# Patient Record
Sex: MALE | Race: WHITE | NOT HISPANIC OR LATINO | Employment: OTHER | URBAN - METROPOLITAN AREA
[De-identification: names, ages, dates, MRNs, and addresses within clinical notes are randomized per-mention and may not be internally consistent; named-entity substitution may affect disease eponyms.]

---

## 2021-12-15 ENCOUNTER — APPOINTMENT (OUTPATIENT)
Dept: NUCLEAR MEDICINE | Facility: HOSPITAL | Age: 66
End: 2021-12-15

## 2021-12-15 ENCOUNTER — APPOINTMENT (OUTPATIENT)
Dept: GENERAL RADIOLOGY | Facility: HOSPITAL | Age: 66
End: 2021-12-15

## 2021-12-15 ENCOUNTER — APPOINTMENT (OUTPATIENT)
Dept: CARDIOLOGY | Facility: HOSPITAL | Age: 66
End: 2021-12-15

## 2021-12-15 ENCOUNTER — HOSPITAL ENCOUNTER (INPATIENT)
Facility: HOSPITAL | Age: 66
LOS: 5 days | Discharge: HOME | End: 2021-12-20
Attending: STUDENT IN AN ORGANIZED HEALTH CARE EDUCATION/TRAINING PROGRAM | Admitting: INTERNAL MEDICINE

## 2021-12-15 DIAGNOSIS — R11.0 NAUSEA: ICD-10-CM

## 2021-12-15 DIAGNOSIS — Z79.01 CHRONIC ANTICOAGULATION: ICD-10-CM

## 2021-12-15 DIAGNOSIS — Z86.39 HISTORY OF HYPERLIPIDEMIA: ICD-10-CM

## 2021-12-15 DIAGNOSIS — Z74.09 IMPAIRED FUNCTIONAL MOBILITY, BALANCE, GAIT, AND ENDURANCE: ICD-10-CM

## 2021-12-15 DIAGNOSIS — E11.42 TYPE 2 DIABETES MELLITUS WITH DIABETIC POLYNEUROPATHY, WITHOUT LONG-TERM CURRENT USE OF INSULIN (HCC): ICD-10-CM

## 2021-12-15 DIAGNOSIS — F17.200 TOBACCO DEPENDENCE: ICD-10-CM

## 2021-12-15 DIAGNOSIS — R44.1 VISUAL HALLUCINATION: ICD-10-CM

## 2021-12-15 DIAGNOSIS — Z86.39 HISTORY OF DIABETES MELLITUS: ICD-10-CM

## 2021-12-15 DIAGNOSIS — R77.8 ELEVATED TROPONIN: ICD-10-CM

## 2021-12-15 DIAGNOSIS — N17.9 ACUTE RENAL FAILURE, UNSPECIFIED ACUTE RENAL FAILURE TYPE (HCC): ICD-10-CM

## 2021-12-15 DIAGNOSIS — F11.93 HEROIN WITHDRAWAL (HCC): Primary | ICD-10-CM

## 2021-12-15 DIAGNOSIS — Z86.79 HISTORY OF HYPERTENSION: ICD-10-CM

## 2021-12-15 DIAGNOSIS — I48.20 CHRONIC ATRIAL FIBRILLATION (HCC): ICD-10-CM

## 2021-12-15 PROBLEM — N18.4 CKD (CHRONIC KIDNEY DISEASE) STAGE 4, GFR 15-29 ML/MIN (HCC): Status: ACTIVE | Noted: 2021-12-15

## 2021-12-15 PROBLEM — E78.5 HYPERLIPIDEMIA: Status: ACTIVE | Noted: 2021-12-15

## 2021-12-15 PROBLEM — K21.9 GERD (GASTROESOPHAGEAL REFLUX DISEASE): Status: ACTIVE | Noted: 2021-12-15

## 2021-12-15 PROBLEM — J44.9 COPD (CHRONIC OBSTRUCTIVE PULMONARY DISEASE) (HCC): Status: ACTIVE | Noted: 2021-12-15

## 2021-12-15 PROBLEM — F19.90 ILLICIT DRUG USE: Status: ACTIVE | Noted: 2021-12-15

## 2021-12-15 PROBLEM — I21.4 NSTEMI (NON-ST ELEVATED MYOCARDIAL INFARCTION) (HCC): Status: ACTIVE | Noted: 2021-12-15

## 2021-12-15 PROBLEM — E11.9 TYPE 2 DIABETES MELLITUS (HCC): Status: ACTIVE | Noted: 2021-12-15

## 2021-12-15 PROBLEM — E03.9 HYPOTHYROIDISM: Status: ACTIVE | Noted: 2021-12-15

## 2021-12-15 PROBLEM — I10 ESSENTIAL HYPERTENSION: Status: ACTIVE | Noted: 2021-12-15

## 2021-12-15 PROBLEM — I48.91 ATRIAL FIBRILLATION (HCC): Status: ACTIVE | Noted: 2021-12-15

## 2021-12-15 PROBLEM — Z72.0 TOBACCO USE: Status: ACTIVE | Noted: 2021-12-15

## 2021-12-15 LAB
ALBUMIN SERPL-MCNC: 3.2 G/DL (ref 3.5–5.2)
ALBUMIN/GLOB SERPL: 1.2 G/DL
ALP SERPL-CCNC: 94 U/L (ref 39–117)
ALT SERPL W P-5'-P-CCNC: 10 U/L (ref 1–41)
AMPHET+METHAMPHET UR QL: NEGATIVE
AMPHETAMINES UR QL: NEGATIVE
ANION GAP SERPL CALCULATED.3IONS-SCNC: 11 MMOL/L (ref 5–15)
ANION GAP SERPL CALCULATED.3IONS-SCNC: 8 MMOL/L (ref 5–15)
AST SERPL-CCNC: 20 U/L (ref 1–40)
BACTERIA UR QL AUTO: ABNORMAL /HPF
BARBITURATES UR QL SCN: NEGATIVE
BASOPHILS # BLD AUTO: 0.08 10*3/MM3 (ref 0–0.2)
BASOPHILS NFR BLD AUTO: 0.7 % (ref 0–1.5)
BENZODIAZ UR QL SCN: NEGATIVE
BH CV ECHO MEAS - AO MAX PG (FULL): 11.5 MMHG
BH CV ECHO MEAS - AO MAX PG: 17.2 MMHG
BH CV ECHO MEAS - AO MEAN PG (FULL): 5.7 MMHG
BH CV ECHO MEAS - AO MEAN PG: 8.8 MMHG
BH CV ECHO MEAS - AO ROOT AREA (BSA CORRECTED): 1.5
BH CV ECHO MEAS - AO ROOT AREA: 7.9 CM^2
BH CV ECHO MEAS - AO ROOT DIAM: 3.2 CM
BH CV ECHO MEAS - AO V2 MAX: 207.5 CM/SEC
BH CV ECHO MEAS - AO V2 MEAN: 135.7 CM/SEC
BH CV ECHO MEAS - AO V2 VTI: 53 CM
BH CV ECHO MEAS - AVA(I,A): 1.5 CM^2
BH CV ECHO MEAS - AVA(I,D): 1.5 CM^2
BH CV ECHO MEAS - AVA(V,A): 1.8 CM^2
BH CV ECHO MEAS - AVA(V,D): 1.8 CM^2
BH CV ECHO MEAS - BSA(HAYCOCK): 2.2 M^2
BH CV ECHO MEAS - BSA: 2.1 M^2
BH CV ECHO MEAS - BZI_BMI: 33.9 KILOGRAMS/M^2
BH CV ECHO MEAS - BZI_METRIC_HEIGHT: 172.7 CM
BH CV ECHO MEAS - BZI_METRIC_WEIGHT: 101.2 KG
BH CV ECHO MEAS - EDV(CUBED): 139.1 ML
BH CV ECHO MEAS - EDV(MOD-SP4): 96 ML
BH CV ECHO MEAS - EDV(TEICH): 128.4 ML
BH CV ECHO MEAS - EF(CUBED): 79.7 %
BH CV ECHO MEAS - EF(MOD-SP4): 66.7 %
BH CV ECHO MEAS - EF(TEICH): 71.8 %
BH CV ECHO MEAS - ESV(CUBED): 28.2 ML
BH CV ECHO MEAS - ESV(MOD-SP4): 32 ML
BH CV ECHO MEAS - ESV(TEICH): 36.2 ML
BH CV ECHO MEAS - FS: 41.3 %
BH CV ECHO MEAS - IVS/LVPW: 0.79
BH CV ECHO MEAS - IVSD: 1 CM
BH CV ECHO MEAS - LA DIMENSION: 4.5 CM
BH CV ECHO MEAS - LA/AO: 1.4
BH CV ECHO MEAS - LAD MAJOR: 5.6 CM
BH CV ECHO MEAS - LAT PEAK E' VEL: 6.4 CM/SEC
BH CV ECHO MEAS - LATERAL E/E' RATIO: 28.1
BH CV ECHO MEAS - LV DIASTOLIC VOL/BSA (35-75): 44.8 ML/M^2
BH CV ECHO MEAS - LV MASS(C)D: 241.5 GRAMS
BH CV ECHO MEAS - LV MASS(C)DI: 112.8 GRAMS/M^2
BH CV ECHO MEAS - LV MAX PG: 5.7 MMHG
BH CV ECHO MEAS - LV MEAN PG: 3.1 MMHG
BH CV ECHO MEAS - LV SYSTOLIC VOL/BSA (12-30): 14.9 ML/M^2
BH CV ECHO MEAS - LV V1 MAX: 119.3 CM/SEC
BH CV ECHO MEAS - LV V1 MEAN: 78.9 CM/SEC
BH CV ECHO MEAS - LV V1 VTI: 25.6 CM
BH CV ECHO MEAS - LVIDD: 5.2 CM
BH CV ECHO MEAS - LVIDS: 3 CM
BH CV ECHO MEAS - LVLD AP4: 7.3 CM
BH CV ECHO MEAS - LVLS AP4: 6.8 CM
BH CV ECHO MEAS - LVOT AREA (M): 3.1 CM^2
BH CV ECHO MEAS - LVOT AREA: 3.2 CM^2
BH CV ECHO MEAS - LVOT DIAM: 2 CM
BH CV ECHO MEAS - LVPWD: 1.3 CM
BH CV ECHO MEAS - MED PEAK E' VEL: 10.6 CM/SEC
BH CV ECHO MEAS - MEDIAL E/E' RATIO: 17
BH CV ECHO MEAS - MR MAX PG: 82 MMHG
BH CV ECHO MEAS - MR MAX VEL: 449.6 CM/SEC
BH CV ECHO MEAS - MR MEAN PG: 57.2 MMHG
BH CV ECHO MEAS - MR MEAN VEL: 357.5 CM/SEC
BH CV ECHO MEAS - MR VTI: 151 CM
BH CV ECHO MEAS - MV DEC TIME: 0.16 SEC
BH CV ECHO MEAS - MV E MAX VEL: 183.1 CM/SEC
BH CV ECHO MEAS - MV MAX PG: 15.3 MMHG
BH CV ECHO MEAS - MV MEAN PG: 5.8 MMHG
BH CV ECHO MEAS - MV V2 MAX: 195.5 CM/SEC
BH CV ECHO MEAS - MV V2 MEAN: 105.9 CM/SEC
BH CV ECHO MEAS - MV V2 VTI: 48.8 CM
BH CV ECHO MEAS - MVA(VTI): 1.7 CM^2
BH CV ECHO MEAS - PA ACC SLOPE: 349.1 CM/SEC^2
BH CV ECHO MEAS - PA ACC TIME: 0.21 SEC
BH CV ECHO MEAS - PA MAX PG: 8.4 MMHG
BH CV ECHO MEAS - PA PR(ACCEL): -15.3 MMHG
BH CV ECHO MEAS - PA V2 MAX: 144.8 CM/SEC
BH CV ECHO MEAS - RAP SYSTOLE: 8 MMHG
BH CV ECHO MEAS - RVSP: 53 MMHG
BH CV ECHO MEAS - SI(AO): 195.4 ML/M^2
BH CV ECHO MEAS - SI(CUBED): 51.8 ML/M^2
BH CV ECHO MEAS - SI(LVOT): 37.9 ML/M^2
BH CV ECHO MEAS - SI(MOD-SP4): 29.9 ML/M^2
BH CV ECHO MEAS - SI(TEICH): 43.1 ML/M^2
BH CV ECHO MEAS - SV(AO): 418.3 ML
BH CV ECHO MEAS - SV(CUBED): 110.9 ML
BH CV ECHO MEAS - SV(LVOT): 81.2 ML
BH CV ECHO MEAS - SV(MOD-SP4): 64 ML
BH CV ECHO MEAS - SV(TEICH): 92.2 ML
BH CV ECHO MEAS - TAPSE (>1.6): 3.2 CM
BH CV ECHO MEAS - TR MAX PG: 45 MMHG
BH CV ECHO MEAS - TR MAX VEL: 332.9 CM/SEC
BH CV ECHO MEAS - TV MAX PG: 2.2 MMHG
BH CV ECHO MEAS - TV V2 MAX: 73.4 CM/SEC
BH CV ECHO MEASUREMENTS AVERAGE E/E' RATIO: 21.54
BH CV VAS BP RIGHT ARM: NORMAL MMHG
BH CV XLRA - RV BASE: 4 CM
BH CV XLRA - RV LENGTH: 6.6 CM
BH CV XLRA - RV MID: 3.7 CM
BH CV XLRA - TDI S': 11.1 CM/SEC
BILIRUB SERPL-MCNC: 0.2 MG/DL (ref 0–1.2)
BILIRUB UR QL STRIP: NEGATIVE
BUN SERPL-MCNC: 44 MG/DL (ref 8–23)
BUN SERPL-MCNC: 51 MG/DL (ref 8–23)
BUN/CREAT SERPL: 18.1 (ref 7–25)
BUN/CREAT SERPL: 19.8 (ref 7–25)
BUPRENORPHINE SERPL-MCNC: NEGATIVE NG/ML
CALCIUM SPEC-SCNC: 8 MG/DL (ref 8.6–10.5)
CALCIUM SPEC-SCNC: 8.2 MG/DL (ref 8.6–10.5)
CANNABINOIDS SERPL QL: NEGATIVE
CHLORIDE SERPL-SCNC: 105 MMOL/L (ref 98–107)
CHLORIDE SERPL-SCNC: 109 MMOL/L (ref 98–107)
CHOLEST SERPL-MCNC: 92 MG/DL (ref 0–200)
CLARITY UR: CLEAR
CO2 SERPL-SCNC: 23 MMOL/L (ref 22–29)
CO2 SERPL-SCNC: 24 MMOL/L (ref 22–29)
COCAINE UR QL: NEGATIVE
COLOR UR: YELLOW
CREAT SERPL-MCNC: 2.22 MG/DL (ref 0.76–1.27)
CREAT SERPL-MCNC: 2.81 MG/DL (ref 0.76–1.27)
CREAT UR-MCNC: 170.2 MG/DL
CRP SERPL-MCNC: 1.1 MG/DL (ref 0–0.5)
D DIMER PPP FEU-MCNC: 0.74 MCGFEU/ML (ref 0–0.56)
D-LACTATE SERPL-SCNC: 1.2 MMOL/L (ref 0.5–2)
DEPRECATED RDW RBC AUTO: 51.4 FL (ref 37–54)
EOSINOPHIL # BLD AUTO: 0.31 10*3/MM3 (ref 0–0.4)
EOSINOPHIL NFR BLD AUTO: 2.8 % (ref 0.3–6.2)
EOSINOPHIL SPEC QL MICRO: 0 % EOS/100 CELLS (ref 0–0)
ERYTHROCYTE [DISTWIDTH] IN BLOOD BY AUTOMATED COUNT: 16.4 % (ref 12.3–15.4)
ETHANOL BLD-MCNC: <10 MG/DL (ref 0–10)
FLUAV RNA RESP QL NAA+PROBE: NOT DETECTED
FLUBV RNA RESP QL NAA+PROBE: NOT DETECTED
GFR SERPL CREATININE-BSD FRML MDRD: 23 ML/MIN/1.73
GFR SERPL CREATININE-BSD FRML MDRD: 30 ML/MIN/1.73
GLOBULIN UR ELPH-MCNC: 2.6 GM/DL
GLUCOSE BLDC GLUCOMTR-MCNC: 129 MG/DL (ref 70–130)
GLUCOSE BLDC GLUCOMTR-MCNC: 87 MG/DL (ref 70–130)
GLUCOSE BLDC GLUCOMTR-MCNC: 90 MG/DL (ref 70–130)
GLUCOSE BLDC GLUCOMTR-MCNC: 96 MG/DL (ref 70–130)
GLUCOSE SERPL-MCNC: 138 MG/DL (ref 65–99)
GLUCOSE SERPL-MCNC: 77 MG/DL (ref 65–99)
GLUCOSE UR STRIP-MCNC: NEGATIVE MG/DL
HBA1C MFR BLD: 7.3 % (ref 4.8–5.6)
HCT VFR BLD AUTO: 32.5 % (ref 37.5–51)
HDLC SERPL-MCNC: 41 MG/DL (ref 40–60)
HGB BLD-MCNC: 10.5 G/DL (ref 13–17.7)
HGB UR QL STRIP.AUTO: ABNORMAL
HYALINE CASTS UR QL AUTO: ABNORMAL /LPF
IMM GRANULOCYTES # BLD AUTO: 0.04 10*3/MM3 (ref 0–0.05)
IMM GRANULOCYTES NFR BLD AUTO: 0.4 % (ref 0–0.5)
KETONES UR QL STRIP: ABNORMAL
LDH SERPL-CCNC: 238 U/L (ref 135–225)
LDLC SERPL CALC-MCNC: 39 MG/DL (ref 0–100)
LDLC/HDLC SERPL: 1.01 {RATIO}
LEUKOCYTE ESTERASE UR QL STRIP.AUTO: NEGATIVE
LIPASE SERPL-CCNC: 20 U/L (ref 13–60)
LV EF 2D ECHO EST: 65 %
LYMPHOCYTES # BLD AUTO: 1.57 10*3/MM3 (ref 0.7–3.1)
LYMPHOCYTES NFR BLD AUTO: 14.3 % (ref 19.6–45.3)
MAGNESIUM SERPL-MCNC: 1.8 MG/DL (ref 1.6–2.4)
MAXIMAL PREDICTED HEART RATE: 157 BPM
MCH RBC QN AUTO: 27.9 PG (ref 26.6–33)
MCHC RBC AUTO-ENTMCNC: 32.3 G/DL (ref 31.5–35.7)
MCV RBC AUTO: 86.2 FL (ref 79–97)
METHADONE UR QL SCN: NEGATIVE
MONOCYTES # BLD AUTO: 0.85 10*3/MM3 (ref 0.1–0.9)
MONOCYTES NFR BLD AUTO: 7.8 % (ref 5–12)
NEUTROPHILS NFR BLD AUTO: 74 % (ref 42.7–76)
NEUTROPHILS NFR BLD AUTO: 8.1 10*3/MM3 (ref 1.7–7)
NITRITE UR QL STRIP: NEGATIVE
NRBC BLD AUTO-RTO: 0 /100 WBC (ref 0–0.2)
OPIATES UR QL: NEGATIVE
OXYCODONE UR QL SCN: POSITIVE
PCP UR QL SCN: NEGATIVE
PH UR STRIP.AUTO: <=5 [PH] (ref 5–8)
PLATELET # BLD AUTO: 236 10*3/MM3 (ref 140–450)
PMV BLD AUTO: 11.9 FL (ref 6–12)
POTASSIUM SERPL-SCNC: 5 MMOL/L (ref 3.5–5.2)
POTASSIUM SERPL-SCNC: 5.5 MMOL/L (ref 3.5–5.2)
PROCALCITONIN SERPL-MCNC: 0.08 NG/ML (ref 0–0.25)
PROPOXYPH UR QL: NEGATIVE
PROT ?TM UR-MCNC: 98.7 MG/DL
PROT SERPL-MCNC: 5.8 G/DL (ref 6–8.5)
PROT UR QL STRIP: ABNORMAL
RBC # BLD AUTO: 3.77 10*6/MM3 (ref 4.14–5.8)
RBC # UR STRIP: ABNORMAL /HPF
REF LAB TEST METHOD: ABNORMAL
SARS-COV-2 RNA RESP QL NAA+PROBE: NOT DETECTED
SODIUM SERPL-SCNC: 139 MMOL/L (ref 136–145)
SODIUM SERPL-SCNC: 141 MMOL/L (ref 136–145)
SODIUM UR-SCNC: 31 MMOL/L
SP GR UR STRIP: 1.02 (ref 1–1.03)
SQUAMOUS #/AREA URNS HPF: ABNORMAL /HPF
STRESS TARGET HR: 133 BPM
T4 FREE SERPL-MCNC: 1.46 NG/DL (ref 0.93–1.7)
TRICYCLICS UR QL SCN: NEGATIVE
TRIGL SERPL-MCNC: 48 MG/DL (ref 0–150)
TROPONIN T SERPL-MCNC: 0.12 NG/ML (ref 0–0.03)
TROPONIN T SERPL-MCNC: 0.12 NG/ML (ref 0–0.03)
TSH SERPL DL<=0.05 MIU/L-ACNC: 0.12 UIU/ML (ref 0.27–4.2)
UFH PPP CHRO-ACNC: 0.1 IU/ML (ref 0.3–0.7)
UROBILINOGEN UR QL STRIP: ABNORMAL
VLDLC SERPL-MCNC: 12 MG/DL (ref 5–40)
WBC # UR STRIP: ABNORMAL /HPF
WBC NRBC COR # BLD: 10.95 10*3/MM3 (ref 3.4–10.8)

## 2021-12-15 PROCEDURE — 93306 TTE W/DOPPLER COMPLETE: CPT | Performed by: INTERNAL MEDICINE

## 2021-12-15 PROCEDURE — A9540 TC99M MAA: HCPCS | Performed by: INTERNAL MEDICINE

## 2021-12-15 PROCEDURE — 86140 C-REACTIVE PROTEIN: CPT | Performed by: PHYSICIAN ASSISTANT

## 2021-12-15 PROCEDURE — 84484 ASSAY OF TROPONIN QUANT: CPT | Performed by: STUDENT IN AN ORGANIZED HEALTH CARE EDUCATION/TRAINING PROGRAM

## 2021-12-15 PROCEDURE — 25010000002 ONDANSETRON PER 1 MG

## 2021-12-15 PROCEDURE — 82962 GLUCOSE BLOOD TEST: CPT

## 2021-12-15 PROCEDURE — 0 TECHNETIUM ALBUMIN AGGREGATED: Performed by: INTERNAL MEDICINE

## 2021-12-15 PROCEDURE — 99284 EMERGENCY DEPT VISIT MOD MDM: CPT

## 2021-12-15 PROCEDURE — 87205 SMEAR GRAM STAIN: CPT | Performed by: NURSE PRACTITIONER

## 2021-12-15 PROCEDURE — 85520 HEPARIN ASSAY: CPT

## 2021-12-15 PROCEDURE — 84300 ASSAY OF URINE SODIUM: CPT | Performed by: NURSE PRACTITIONER

## 2021-12-15 PROCEDURE — 78582 LUNG VENTILAT&PERFUS IMAGING: CPT

## 2021-12-15 PROCEDURE — 99221 1ST HOSP IP/OBS SF/LOW 40: CPT | Performed by: INTERNAL MEDICINE

## 2021-12-15 PROCEDURE — 83615 LACTATE (LD) (LDH) ENZYME: CPT | Performed by: PHYSICIAN ASSISTANT

## 2021-12-15 PROCEDURE — 85379 FIBRIN DEGRADATION QUANT: CPT | Performed by: PHYSICIAN ASSISTANT

## 2021-12-15 PROCEDURE — 82077 ASSAY SPEC XCP UR&BREATH IA: CPT | Performed by: STUDENT IN AN ORGANIZED HEALTH CARE EDUCATION/TRAINING PROGRAM

## 2021-12-15 PROCEDURE — 25010000002 DIAZEPAM PER 5 MG: Performed by: NURSE PRACTITIONER

## 2021-12-15 PROCEDURE — 81001 URINALYSIS AUTO W/SCOPE: CPT | Performed by: PHYSICIAN ASSISTANT

## 2021-12-15 PROCEDURE — 83605 ASSAY OF LACTIC ACID: CPT | Performed by: PHYSICIAN ASSISTANT

## 2021-12-15 PROCEDURE — 83690 ASSAY OF LIPASE: CPT | Performed by: STUDENT IN AN ORGANIZED HEALTH CARE EDUCATION/TRAINING PROGRAM

## 2021-12-15 PROCEDURE — 84156 ASSAY OF PROTEIN URINE: CPT | Performed by: NURSE PRACTITIONER

## 2021-12-15 PROCEDURE — 93306 TTE W/DOPPLER COMPLETE: CPT

## 2021-12-15 PROCEDURE — 93005 ELECTROCARDIOGRAM TRACING: CPT | Performed by: PHYSICIAN ASSISTANT

## 2021-12-15 PROCEDURE — 84145 PROCALCITONIN (PCT): CPT | Performed by: PHYSICIAN ASSISTANT

## 2021-12-15 PROCEDURE — 80306 DRUG TEST PRSMV INSTRMNT: CPT | Performed by: PHYSICIAN ASSISTANT

## 2021-12-15 PROCEDURE — 83735 ASSAY OF MAGNESIUM: CPT | Performed by: PHYSICIAN ASSISTANT

## 2021-12-15 PROCEDURE — 83036 HEMOGLOBIN GLYCOSYLATED A1C: CPT | Performed by: PHYSICIAN ASSISTANT

## 2021-12-15 PROCEDURE — 85025 COMPLETE CBC W/AUTO DIFF WBC: CPT | Performed by: PHYSICIAN ASSISTANT

## 2021-12-15 PROCEDURE — 84439 ASSAY OF FREE THYROXINE: CPT | Performed by: PHYSICIAN ASSISTANT

## 2021-12-15 PROCEDURE — 87636 SARSCOV2 & INF A&B AMP PRB: CPT | Performed by: PHYSICIAN ASSISTANT

## 2021-12-15 PROCEDURE — 82570 ASSAY OF URINE CREATININE: CPT | Performed by: NURSE PRACTITIONER

## 2021-12-15 PROCEDURE — 25010000002 DIAZEPAM PER 5 MG: Performed by: INTERNAL MEDICINE

## 2021-12-15 PROCEDURE — 25010000002 DIAZEPAM PER 5 MG: Performed by: STUDENT IN AN ORGANIZED HEALTH CARE EDUCATION/TRAINING PROGRAM

## 2021-12-15 PROCEDURE — 84443 ASSAY THYROID STIM HORMONE: CPT | Performed by: PHYSICIAN ASSISTANT

## 2021-12-15 PROCEDURE — 80053 COMPREHEN METABOLIC PANEL: CPT | Performed by: STUDENT IN AN ORGANIZED HEALTH CARE EDUCATION/TRAINING PROGRAM

## 2021-12-15 PROCEDURE — 71045 X-RAY EXAM CHEST 1 VIEW: CPT

## 2021-12-15 PROCEDURE — 99223 1ST HOSP IP/OBS HIGH 75: CPT | Performed by: INTERNAL MEDICINE

## 2021-12-15 PROCEDURE — 84484 ASSAY OF TROPONIN QUANT: CPT | Performed by: PHYSICIAN ASSISTANT

## 2021-12-15 PROCEDURE — 36415 COLL VENOUS BLD VENIPUNCTURE: CPT

## 2021-12-15 PROCEDURE — 80061 LIPID PANEL: CPT | Performed by: PHYSICIAN ASSISTANT

## 2021-12-15 RX ORDER — SODIUM CHLORIDE 450 MG/100ML
75 INJECTION, SOLUTION INTRAVENOUS CONTINUOUS
Status: DISCONTINUED | OUTPATIENT
Start: 2021-12-15 | End: 2021-12-16

## 2021-12-15 RX ORDER — SODIUM CHLORIDE 0.9 % (FLUSH) 0.9 %
10 SYRINGE (ML) INJECTION AS NEEDED
Status: DISCONTINUED | OUTPATIENT
Start: 2021-12-15 | End: 2021-12-20 | Stop reason: HOSPADM

## 2021-12-15 RX ORDER — ALBUTEROL SULFATE 2.5 MG/3ML
2.5 SOLUTION RESPIRATORY (INHALATION) EVERY 4 HOURS PRN
COMMUNITY

## 2021-12-15 RX ORDER — SODIUM CHLORIDE 0.9 % (FLUSH) 0.9 %
10 SYRINGE (ML) INJECTION EVERY 12 HOURS SCHEDULED
Status: DISCONTINUED | OUTPATIENT
Start: 2021-12-15 | End: 2021-12-20 | Stop reason: HOSPADM

## 2021-12-15 RX ORDER — METHOCARBAMOL 750 MG/1
750 TABLET, FILM COATED ORAL ONCE
Status: COMPLETED | OUTPATIENT
Start: 2021-12-15 | End: 2021-12-15

## 2021-12-15 RX ORDER — BUMETANIDE 1 MG/1
1 TABLET ORAL 2 TIMES DAILY
COMMUNITY

## 2021-12-15 RX ORDER — ONDANSETRON 2 MG/ML
4 INJECTION INTRAMUSCULAR; INTRAVENOUS ONCE
Status: COMPLETED | OUTPATIENT
Start: 2021-12-15 | End: 2021-12-15

## 2021-12-15 RX ORDER — CETIRIZINE HYDROCHLORIDE 10 MG/1
10 TABLET ORAL DAILY PRN
COMMUNITY

## 2021-12-15 RX ORDER — DIAZEPAM 5 MG/ML
2.5 INJECTION, SOLUTION INTRAMUSCULAR; INTRAVENOUS ONCE
Status: COMPLETED | OUTPATIENT
Start: 2021-12-15 | End: 2021-12-15

## 2021-12-15 RX ORDER — SPIRONOLACTONE 50 MG/1
50 TABLET, FILM COATED ORAL DAILY
COMMUNITY

## 2021-12-15 RX ORDER — OMEPRAZOLE 40 MG/1
40 CAPSULE, DELAYED RELEASE ORAL DAILY
COMMUNITY

## 2021-12-15 RX ORDER — ASPIRIN 81 MG/1
81 TABLET ORAL DAILY
COMMUNITY

## 2021-12-15 RX ORDER — ATORVASTATIN CALCIUM 10 MG/1
10 TABLET, FILM COATED ORAL NIGHTLY
COMMUNITY

## 2021-12-15 RX ORDER — GABAPENTIN 800 MG/1
800 TABLET ORAL 4 TIMES DAILY
COMMUNITY
End: 2021-12-20 | Stop reason: HOSPADM

## 2021-12-15 RX ORDER — METOPROLOL SUCCINATE 25 MG/1
25 TABLET, EXTENDED RELEASE ORAL DAILY
COMMUNITY

## 2021-12-15 RX ORDER — PANTOPRAZOLE SODIUM 40 MG/1
40 TABLET, DELAYED RELEASE ORAL EVERY MORNING
Status: DISCONTINUED | OUTPATIENT
Start: 2021-12-15 | End: 2021-12-20 | Stop reason: HOSPADM

## 2021-12-15 RX ORDER — NICOTINE POLACRILEX 4 MG
15 LOZENGE BUCCAL
Status: DISCONTINUED | OUTPATIENT
Start: 2021-12-15 | End: 2021-12-20 | Stop reason: HOSPADM

## 2021-12-15 RX ORDER — DEXTROSE MONOHYDRATE 25 G/50ML
25 INJECTION, SOLUTION INTRAVENOUS
Status: DISCONTINUED | OUTPATIENT
Start: 2021-12-15 | End: 2021-12-20 | Stop reason: HOSPADM

## 2021-12-15 RX ORDER — LEVOTHYROXINE SODIUM 175 UG/1
175 TABLET ORAL
COMMUNITY
End: 2021-12-20 | Stop reason: HOSPADM

## 2021-12-15 RX ORDER — DIAZEPAM 5 MG/ML
2.5 INJECTION, SOLUTION INTRAMUSCULAR; INTRAVENOUS EVERY 4 HOURS PRN
Status: DISCONTINUED | OUTPATIENT
Start: 2021-12-15 | End: 2021-12-20 | Stop reason: HOSPADM

## 2021-12-15 RX ORDER — METOPROLOL SUCCINATE 25 MG/1
25 TABLET, EXTENDED RELEASE ORAL DAILY
Status: DISCONTINUED | OUTPATIENT
Start: 2021-12-15 | End: 2021-12-20 | Stop reason: HOSPADM

## 2021-12-15 RX ORDER — ONDANSETRON 2 MG/ML
INJECTION INTRAMUSCULAR; INTRAVENOUS
Status: COMPLETED
Start: 2021-12-15 | End: 2021-12-15

## 2021-12-15 RX ORDER — MONTELUKAST SODIUM 10 MG/1
10 TABLET ORAL NIGHTLY
Status: DISCONTINUED | OUTPATIENT
Start: 2021-12-15 | End: 2021-12-20 | Stop reason: HOSPADM

## 2021-12-15 RX ORDER — BUDESONIDE AND FORMOTEROL FUMARATE DIHYDRATE 160; 4.5 UG/1; UG/1
2 AEROSOL RESPIRATORY (INHALATION)
COMMUNITY

## 2021-12-15 RX ORDER — MONTELUKAST SODIUM 10 MG/1
10 TABLET ORAL NIGHTLY
COMMUNITY

## 2021-12-15 RX ORDER — MULTIPLE VITAMINS W/ MINERALS TAB 9MG-400MCG
1 TAB ORAL DAILY
COMMUNITY

## 2021-12-15 RX ORDER — GABAPENTIN 400 MG/1
400 CAPSULE ORAL 4 TIMES DAILY
Status: DISCONTINUED | OUTPATIENT
Start: 2021-12-15 | End: 2021-12-20 | Stop reason: HOSPADM

## 2021-12-15 RX ORDER — NICOTINE 21 MG/24HR
1 PATCH, TRANSDERMAL 24 HOURS TRANSDERMAL
Status: DISCONTINUED | OUTPATIENT
Start: 2021-12-15 | End: 2021-12-20 | Stop reason: HOSPADM

## 2021-12-15 RX ORDER — CETIRIZINE HYDROCHLORIDE 10 MG/1
10 TABLET ORAL DAILY
Status: DISCONTINUED | OUTPATIENT
Start: 2021-12-15 | End: 2021-12-20 | Stop reason: HOSPADM

## 2021-12-15 RX ORDER — ATORVASTATIN CALCIUM 10 MG/1
10 TABLET, FILM COATED ORAL DAILY
Status: DISCONTINUED | OUTPATIENT
Start: 2021-12-15 | End: 2021-12-20 | Stop reason: HOSPADM

## 2021-12-15 RX ORDER — METFORMIN HYDROCHLORIDE 500 MG/1
1500 TABLET, EXTENDED RELEASE ORAL
COMMUNITY

## 2021-12-15 RX ORDER — LISINOPRIL 40 MG/1
40 TABLET ORAL DAILY
COMMUNITY

## 2021-12-15 RX ORDER — IPRATROPIUM BROMIDE AND ALBUTEROL SULFATE 2.5; .5 MG/3ML; MG/3ML
3 SOLUTION RESPIRATORY (INHALATION) EVERY 4 HOURS PRN
Status: DISCONTINUED | OUTPATIENT
Start: 2021-12-15 | End: 2021-12-20 | Stop reason: HOSPADM

## 2021-12-15 RX ORDER — DULAGLUTIDE 1.5 MG/.5ML
1.5 INJECTION, SOLUTION SUBCUTANEOUS
COMMUNITY

## 2021-12-15 RX ORDER — ALBUTEROL SULFATE 90 UG/1
2 AEROSOL, METERED RESPIRATORY (INHALATION) EVERY 4 HOURS PRN
COMMUNITY

## 2021-12-15 RX ORDER — GLIMEPIRIDE 2 MG/1
2 TABLET ORAL
COMMUNITY

## 2021-12-15 RX ADMIN — Medication 1 PATCH: at 09:34

## 2021-12-15 RX ADMIN — DIAZEPAM 2.5 MG: 5 INJECTION, SOLUTION INTRAMUSCULAR; INTRAVENOUS at 04:40

## 2021-12-15 RX ADMIN — DIAZEPAM 2.5 MG: 5 INJECTION, SOLUTION INTRAMUSCULAR; INTRAVENOUS at 06:29

## 2021-12-15 RX ADMIN — ONDANSETRON 4 MG: 2 INJECTION INTRAMUSCULAR; INTRAVENOUS at 03:10

## 2021-12-15 RX ADMIN — DIAZEPAM 2.5 MG: 5 INJECTION, SOLUTION INTRAMUSCULAR; INTRAVENOUS at 08:14

## 2021-12-15 RX ADMIN — LEVOTHYROXINE SODIUM 175 MCG: 150 TABLET ORAL at 09:31

## 2021-12-15 RX ADMIN — SODIUM CHLORIDE, PRESERVATIVE FREE 10 ML: 5 INJECTION INTRAVENOUS at 20:30

## 2021-12-15 RX ADMIN — GABAPENTIN 400 MG: 400 CAPSULE ORAL at 22:52

## 2021-12-15 RX ADMIN — KIT FOR THE PREPARATION OF TECHNETIUM TC 99M ALBUMIN AGGREGATED 1 DOSE: 2.5 INJECTION, POWDER, FOR SOLUTION INTRAVENOUS at 08:20

## 2021-12-15 RX ADMIN — ATORVASTATIN CALCIUM 10 MG: 10 TABLET, FILM COATED ORAL at 09:31

## 2021-12-15 RX ADMIN — CETIRIZINE HYDROCHLORIDE 10 MG: 10 TABLET, FILM COATED ORAL at 09:31

## 2021-12-15 RX ADMIN — DIAZEPAM 2.5 MG: 5 INJECTION, SOLUTION INTRAMUSCULAR; INTRAVENOUS at 10:58

## 2021-12-15 RX ADMIN — METOPROLOL SUCCINATE 25 MG: 25 TABLET, EXTENDED RELEASE ORAL at 09:31

## 2021-12-15 RX ADMIN — SODIUM CHLORIDE 75 ML/HR: 4.5 INJECTION, SOLUTION INTRAVENOUS at 13:31

## 2021-12-15 RX ADMIN — MONTELUKAST SODIUM 10 MG: 10 TABLET, COATED ORAL at 20:30

## 2021-12-15 RX ADMIN — PANTOPRAZOLE SODIUM 40 MG: 40 TABLET, DELAYED RELEASE ORAL at 09:31

## 2021-12-15 RX ADMIN — DIAZEPAM 2.5 MG: 5 INJECTION, SOLUTION INTRAMUSCULAR; INTRAVENOUS at 03:52

## 2021-12-15 RX ADMIN — METHOCARBAMOL 750 MG: 750 TABLET ORAL at 03:52

## 2021-12-15 RX ADMIN — RIVAROXABAN 15 MG: 15 TABLET, FILM COATED ORAL at 17:04

## 2021-12-15 RX ADMIN — DIAZEPAM 2.5 MG: 5 INJECTION, SOLUTION INTRAMUSCULAR; INTRAVENOUS at 23:01

## 2021-12-15 NOTE — CONSULTS
"[facility] at   Cardiovascular Consultation Note    Reason for consultation: Permanent atrial fibrillation #2 elevated troponin    History of Present Illness:  65-year-old male with permanent A. fib treated with rate control and anticoagulation, diabetes, hyperlipidemia, hypertension, smoker, long drug history use with several drug overdoses he was seen in 2 different ERs for \"\" help.  At [facility] he was referred to [facility] but they stated he needed medical clearance to be there.  We are asked to see this patient for his A. Fib.  I cannot get any history from the patient.  He is very lethargic.  I asked the nurse about his lethargy and he states that sometimes he answer questions and sometimes he is very somnolent.  I cannot get any history as to whether not the patient is compliant with his medications.  The only question he answered was I asked him if he ever been shocked back in rhythm he said no.  Letter that he goes into lethargy again.    Cardiac risk factors: Male sex #2 smoker #3 age greater than 45 number diabetes #5 hypertension #6 hyperlipidemia    Past medical and surgical history, social and family history reviewed in EMR.    REVIEW OF SYSTEMS:     Past Medical History:   Diagnosis Date   • Diabetes mellitus (HCC)    • Hyperlipidemia    • Hypertension      History reviewed. No pertinent surgical history.  Social History     Socioeconomic History   • Marital status:    Tobacco Use   • Smoking status: Current Every Day Smoker     Packs/day: 1.00     Years: 10.00     Pack years: 10.00   Vaping Use   • Vaping Use: Never used   Substance and Sexual Activity   • Alcohol use: Not Currently   • Drug use: Yes   • Sexual activity: Not Currently     History reviewed. No pertinent family history.    H&P ROS reviewed and pertinent CV ROS as noted in HPI.    Cardiac: Patient has permanent atrial fibrillation and takes beta-blocker and Xarelto  Respiratory: Elevated " D-dimer with an intermediate probability of PE by V/Q scanning  Lower Extremities: Patient has a open lesion on the dorsum of his right foot  GI: No history available  Neuro: No history available  Hematology: No history available  Renal: Patient has CKD with quite possible PHIL on top of that  Musculoskeletal: No history available  Endocrine: The patient does have diabetes  Constitutional: Patient abuses drugs states he is desperate to undergo detox  Psych: Drug abuser      Physical Exam: General overweight male lethargic who awakens and only answers 1 question.  He appears to probably have some obstructive sleep apnea  HEENT-no JVP.  Patient is edentulous       Respiratory: Rhonchi bilaterally with equal bilateral symmetrical expansion       Cardiovascular: Irregular rate and rhythm with no gallop no edema to palpation       GI: Soft obese flat       Lower Extremities: Has an open superficial lesion on the dorsum of his right foot about the size of a silver dollar       Neuro: Cannot assess due to patient lethargy       Skin: Warm and dry with no edema to palpation       Psych: Cannot assess due to patient lethargy    Results Review: EKG shows A. fib controlled ventricular response no ischemia.  Echo shows normal wall motion EF           Vital Sign Min/Max for last 24 hours  Temp  Min: 98.3 °F (36.8 °C)  Max: 98.3 °F (36.8 °C)   BP  Min: 115/71  Max: 138/70   Pulse  Min: 64  Max: 79   Resp  Min: 16  Max: 32   SpO2  Min: 91 %  Max: 98 %   No data recorded    No intake or output data in the 24 hours ending 12/15/21 1140          Current Facility-Administered Medications:   •  atorvastatin (LIPITOR) tablet 10 mg, 10 mg, Oral, Daily, Sincere Alford PA-C, 10 mg at 12/15/21 0931  •  cetirizine (zyrTEC) tablet 10 mg, 10 mg, Oral, Daily, Sincere Alford PA-C, 10 mg at 12/15/21 0931  •  dextrose (D50W) (25 g/50 mL) IV injection 25 g, 25 g, Intravenous, Q15 Min PRN, Sincere Alford PA-C  •   dextrose (GLUTOSE) oral gel 15 g, 15 g, Oral, Q15 Min PRN, Sincere Alford PA-C  •  diazePAM (VALIUM) injection 2.5 mg, 2.5 mg, Intravenous, Q4H PRN, Elodia Bryant, , 2.5 mg at 12/15/21 1058  •  glucagon (human recombinant) (GLUCAGEN DIAGNOSTIC) injection 1 mg, 1 mg, Subcutaneous, Q15 Min PRN, Sincere Alford PA-C  •  insulin lispro (humaLOG) injection 0-7 Units, 0-7 Units, Subcutaneous, TID AC, Sincere Alford PA-C  •  ipratropium-albuterol (DUO-NEB) nebulizer solution 3 mL, 3 mL, Nebulization, Q4H PRN, Sincere Alfrod PA-C  •  levothyroxine (SYNTHROID, LEVOTHROID) tablet 175 mcg, 175 mcg, Oral, Q AM, Sincere Alford PA-C, 175 mcg at 12/15/21 0931  •  metoprolol succinate XL (TOPROL-XL) 24 hr tablet 25 mg, 25 mg, Oral, Daily, Sincere Alford PA-C, 25 mg at 12/15/21 0931  •  montelukast (SINGULAIR) tablet 10 mg, 10 mg, Oral, Nightly, Sincere Alford PA-C  •  nicotine (NICODERM CQ) 21 MG/24HR patch 1 patch, 1 patch, Transdermal, Q24H, Sincere Alford PA-C, 1 patch at 12/15/21 0934  •  pantoprazole (PROTONIX) EC tablet 40 mg, 40 mg, Oral, QAM, Sincere Alford PA-C, 40 mg at 12/15/21 0931  •  sodium chloride 0.9 % bolus 1,000 mL, 1,000 mL, Intravenous, Once, Drew Salvador MD, Stopped at 12/15/21 0634  •  sodium chloride 0.9 % flush 10 mL, 10 mL, Intravenous, Q12H, Sincere Alford PA-C  •  sodium chloride 0.9 % flush 10 mL, 10 mL, Intravenous, PRN, Sincere Alford PA-C    Current Outpatient Medications:   •  aspirin 81 MG EC tablet, Take 81 mg by mouth Daily., Disp: , Rfl:   •  atorvastatin (LIPITOR) 10 MG tablet, Take 10 mg by mouth Daily., Disp: , Rfl:   •  bumetanide (BUMEX) 1 MG tablet, Take 1 mg by mouth 2 (Two) Times a Day., Disp: , Rfl:   •  cetirizine (zyrTEC) 10 MG tablet, Take 10 mg by mouth Daily., Disp: , Rfl:   •  glimepiride (AMARYL) 2 MG tablet, Take 2 mg by mouth Every Morning Before  Breakfast., Disp: , Rfl:   •  levothyroxine (SYNTHROID, LEVOTHROID) 175 MCG tablet, Take 175 mcg by mouth Every Morning., Disp: , Rfl:   •  lisinopril (PRINIVIL,ZESTRIL) 40 MG tablet, Take 40 mg by mouth Daily., Disp: , Rfl:   •  metFORMIN (GLUCOPHAGE) 500 MG tablet, Take 500 mg by mouth 3 (Three) Times a Day With Meals., Disp: , Rfl:   •  metoprolol succinate XL (TOPROL-XL) 25 MG 24 hr tablet, Take 25 mg by mouth Daily., Disp: , Rfl:   •  montelukast (SINGULAIR) 10 MG tablet, Take 10 mg by mouth Every Night., Disp: , Rfl:   •  multivitamin with minerals (Multivitamin Adult) tablet tablet, Take 1 tablet by mouth Daily., Disp: , Rfl:   •  omeprazole (priLOSEC) 40 MG capsule, Take 40 mg by mouth Daily., Disp: , Rfl:   •  rivaroxaban (XARELTO) 15 MG tablet, Take 15 mg by mouth Daily With Dinner., Disp: , Rfl:   •  spironolactone (ALDACTONE) 50 MG tablet, Take 50 mg by mouth Daily., Disp: , Rfl:     Lab Review:   Results from last 7 days   Lab Units 12/15/21  0238   WBC 10*3/mm3 10.95*   HEMOGLOBIN g/dL 10.5*   PLATELETS 10*3/mm3 236     Results from last 7 days   Lab Units 12/15/21  0445 12/15/21  0200   SODIUM mmol/L  --  139   POTASSIUM mmol/L  --  5.0   CO2 mmol/L  --  23.0   BUN mg/dL  --  51*   CREATININE mg/dL  --  2.81*   MAGNESIUM mg/dL 1.8  --    GLUCOSE mg/dL  --  138*     Estimated Creatinine Clearance: 31 mL/min (A) (by C-G formula based on SCr of 2.81 mg/dL (H)).    Results from last 7 days   Lab Units 12/15/21  0238   HEMOGLOBIN A1C % 7.30*     .lipd  Lab Results   Component Value Date    TRIG 48 12/15/2021    HDL 41 12/15/2021    AST 20 12/15/2021    ALT 10 12/15/2021       Radiology Reports:  Imaging Results (Last 72 Hours)     Procedure Component Value Units Date/Time    NM Lung Ventilation Perfusion [[id number]] Collected: 12/15/21 0844     Updated: 12/15/21 0902    Narrative:      EXAMINATION: NM LUNG VENTILATION PERFUSION-     INDICATION: Elevated D-dimer; F11.23-Opioid dependence with  withdrawal;  R44.1-Visual hallucinations; R11.0-Nausea; R77.8-Other specified  abnormalities of plasma proteins; N17.9-Acute kidney failure,  unspecified; I48.20-Chronic atrial fibrillation, unspecified;  Z79.01-Long term (current) use of anticoagulants; Z86.39-Personal  history of other endocrine, nutritional and metabolic disease; shortness  of breath.     TECHNIQUE: 5.5 mCi of technetium 99m MAA was injected intravenously.  Imaging was obtained of the lung fields in the anterior, posterior, and  oblique projections.     COMPARISON: Chest x-ray dated the same date.     FINDINGS: There are two small subsegmental defects identified within the  right lung in the inferior and lateral aspect. There is homogeneous  uptake of tracer seen within the left lung. The heart is enlarged. No  pleural effusion or pneumothorax.       Impression:      Intermediate probability for pulmonary embolism with two  small subsegmental defects identified one in the right mid lung in the  lateral aspect and the second in the right lower lung.     D:  12/15/2021  E:  12/15/2021           XR Chest 1 View [[id number]] Collected: 12/15/21 0832     Updated: 12/15/21 0855    Narrative:      EXAMINATION: XR CHEST 1 VW-      INDICATION: Shortness of air.     COMPARISON: None.     FINDINGS: Portable chest reveals cardiac and mediastinal silhouettes  within normal limits. The lung fields are grossly clear. No focal  parenchymal opacification is present.  No pleural effusion or  pneumothorax. The bony structures are unremarkable. Pulmonary  vascularity is within normal limits.           Impression:      No acute cardiopulmonary disease.     D:  12/15/2021  E:  12/15/2021                Assessment/Plan: 1 patient has known permanent atrial fibrillation for which she takes metoprolol and Xarelto.  This is the recommended ongoing strategy for treatment  2 is elevated troponin which may be secondary to his elevated creatinine.  His echo is normal EKG shows  no ischemia.  Consider nuclear perfusion scan prior to discharge  3 pulmonary embolism-the patient will need adjusted dose for pulmonary embolism as well as his renal function  4 CKD-we will start patient on IV fluids and repeat BMP in a.m.      Gregory Castanon MD  12/15/21  11:40 EST

## 2021-12-15 NOTE — ED PROVIDER NOTES
"Subjective   This is a 65-year-old male that presents the ER with symptoms of detox from heroin.  Patient reports that his last use of heroin 1 was on Sunday, 12/12/2021 at around 2200.  Patient says that he snorts heroin and has had issues with drug use for the last 5 to 6 years.  He has never been through a detox program, but patient says that he is desperate to stop.  He says that he cannot live like this anymore and he wants to \"live my life\".  Patient says at his peak use of heroin, he was snorting 1 to 1-1/2 g of heroin per day.  Patient reports withdrawal symptoms of bilateral leg cramps and intermittent jerking of extremities and restlessness.  He also has had some dizziness with standing and nausea but denies any vomiting.  He also reports some visual hallucinations and felt like he has seen kids flying in his peripheral vision, and then when he looks there are no children there.  Patient denies any chest pain, shortness of breath, or abdominal pain.  He had a normal bowel movement earlier today.  Patient has past medical history significant for tobacco dependence.  He smokes half to 1 pack of cigarettes per day.  He also has past medical history significant for hypertension, COPD, chronic atrial fibrillation, chronic anticoagulation of Xarelto, type 2 diabetes mellitus, noninsulin managed, GERD, and obesity.  Patient says that he took a pain pill \"off the street\" to help with his withdrawal symptoms.  He denies any alcohol or other drug use.  Patient says that he went to [facility] earlier today and they turned him away.  He then went to [facility] emergency room and they referred him to the [facility].  When he went to the [facility], they advised that he needed medical clearance before coming to their facility for detox.  Patient says that is why he came here is to get some help.  He does not know where else to turn.  Patient denies any recent symptoms of illness.  He denies any dysuria, urgency, " "or frequency.  Patient's nephew's girlfriend is at the bedside and patient has been living with her.  Caregiver is also tearful at the bedside and says that patient has overdosed 4 times in November, 2021 accidentally.  He has overdosed 9 times total, and they really want to get patient some help.  Patient is followed by cardiologist, Dr. Varghese and Almas Baker PA-C, in [place], [place].  Patient also noted a small open wound to the dorsal aspect of the right foot.  He has been using Silvadene cream.  He denies any burn.  He feels like it is related to his diabetes.      History provided by:  Caregiver and patient  Withdrawal  Location:  Withdrawal from heroin.  Patient reports symptoms of leg cramps, jerking of extremities, visual hallucinations, dizziness, and nausea.  Duration:  2 days  Timing:  Constant  Progression:  Unchanged  Chronicity:  New  Context:  History of heroin abuse.  Last use was 12/12/2021 at 2200.  Patient wants help with detox.  He reports symptoms of restlessness, leg cramps, jerking of extremities, and visual hallucinations.  Ineffective treatments:  Patient took a \"pain pill\" off the street to help with symptoms of detox.  Associated symptoms: cough (Mild nonproductive cough.  Patient says that it is his \"smoker's cough\"), myalgias (Leg cramps) and nausea    Associated symptoms: no abdominal pain, no chest pain, no congestion, no diarrhea (Normal BM earlier today), no fatigue, no fever, no headaches, no rhinorrhea, no shortness of breath, no sore throat, no vomiting and no wheezing        Review of Systems   Constitutional: Positive for appetite change and chills. Negative for diaphoresis, fatigue and fever.   HENT: Negative.  Negative for congestion, postnasal drip, rhinorrhea, sinus pressure, sinus pain, sneezing and sore throat.    Respiratory: Positive for cough (Mild nonproductive cough.  Patient says that it is his \"smoker's cough\"). Negative for chest tightness, shortness of " breath and wheezing.         Smokes half pack to 1 pack of cigarettes per day.  History of COPD.   Cardiovascular: Negative.  Negative for chest pain, palpitations and leg swelling.   Gastrointestinal: Positive for nausea. Negative for abdominal pain, diarrhea (Normal BM earlier today) and vomiting.   Genitourinary: Negative.  Negative for dysuria, flank pain, frequency and urgency.   Musculoskeletal: Positive for myalgias (Leg cramps).   Skin: Positive for wound (Small open wound to the dorsal aspect of the right foot just below the toes.).   Neurological: Negative for headaches.   Psychiatric/Behavioral: Positive for hallucinations (Visual hallucinations.).        History of heroin abuse for the last 5 to 6 years.  Patient usually snorts 1 to 1/2 g/day.  Last heroin use was 12/12/2021 at 2200.  Patient denies any other drug use or alcohol use.   All other systems reviewed and are negative.      Past Medical History:   Diagnosis Date   • Diabetes mellitus (HCC)    • Hyperlipidemia    • Hypertension        No Known Allergies    History reviewed. No pertinent surgical history.    History reviewed. No pertinent family history.    Social History     Socioeconomic History   • Marital status:    Tobacco Use   • Smoking status: Current Every Day Smoker     Packs/day: 1.00     Years: 10.00     Pack years: 10.00   Vaping Use   • Vaping Use: Never used   Substance and Sexual Activity   • Alcohol use: Not Currently   • Drug use: Yes   • Sexual activity: Not Currently           Objective   Physical Exam  Vitals and nursing note reviewed.   Constitutional:       Appearance: Normal appearance.   HENT:      Head: Normocephalic and atraumatic.      Right Ear: Tympanic membrane normal.      Left Ear: Tympanic membrane normal.      Nose: Nose normal.      Mouth/Throat:      Mouth: Mucous membranes are dry.      Pharynx: Oropharynx is clear.      Comments: Oral mucous membranes appear dry  Eyes:      Extraocular Movements:  Extraocular movements intact.      Conjunctiva/sclera: Conjunctivae normal.      Pupils: Pupils are equal, round, and reactive to light.   Cardiovascular:      Rate and Rhythm: Normal rate. Rhythm irregular.  No extrasystoles are present.     Pulses: Normal pulses.           Dorsalis pedis pulses are 2+ on the right side and 2+ on the left side.        Posterior tibial pulses are 2+ on the right side and 2+ on the left side.      Heart sounds: Normal heart sounds.      Comments: Regular rate.  Irregular rhythm.  History of chronic atrial fibrillation.  No pedal edema to lower extremities.  Positive bilateral DP and PT pulses.  Pulmonary:      Effort: Pulmonary effort is normal.      Breath sounds: Normal breath sounds.      Comments: Lungs are clear to auscultation bilaterally.  Abdominal:      General: Bowel sounds are normal.      Palpations: Abdomen is soft.   Musculoskeletal:         General: Normal range of motion.      Cervical back: Normal range of motion and neck supple.      Right lower leg: No edema.      Left lower leg: No edema.   Skin:     General: Skin is warm and dry.      Comments: Patient has a superficial open wound to the dorsal aspect of the right foot just below the toes.  Open wound measures 3 cm x 5 cm.  There is no purulent drainage.  There is no central induration or concern for abscess.  Skin is extremely dry with flaking to both bilateral lower extremities.  There is no surrounding erythema or warmth.   Neurological:      General: No focal deficit present.      Mental Status: He is alert.      Cranial Nerves: Cranial nerves are intact.      Sensory: Sensation is intact.      Motor: Motor function is intact.      Coordination: Coordination is intact.      Comments: Neuro intact and nonfocal.  Equal  strength 5 out of 5 and equal muscle strength lower extremities 5 out of 5.  No focal deficits.  Patient has intermittent jerking movements of extremities.  No seizure activity.  Patient is  "cognizant when the jerking movements occur.   Psychiatric:         Attention and Perception: He perceives visual hallucinations.         Mood and Affect: Mood is anxious.         Speech: Speech normal.         Behavior: Behavior is agitated.         Thought Content: Thought content normal.         Cognition and Memory: Cognition normal.         Judgment: Judgment normal.      Comments: Patient anxious and agitated.  He denies any suicidal or homicidal ideations.  He is tearful at times at requesting help for detox from heroin.  Patient reports history of recent visual hallucinations by \"seeing children\" that are not there.  He denies any hallucinations at present.         Procedures           ED Course  ED Course as of 12/15/21 0602   Wed Dec 15, 2021   0547 EKGs x2 showed atrial fibrillation, rate controlled.  No acute ST-T wave changes consistent with ischemia.  CBC was essentially normal.  Chemistries reveal BUN and creatinine of 51 and 2.81 without previous chemistries for comparison.  Calcium is 8.0.  LFTs were within normal limits.  Lipase is 20.  Procalcitonin is 0.08.  Alcohol level is less than 10.  LDH is 238 and lactic acid is 1.2.  Urinalysis reveals trace ketones, proteinuria, but no acute infectious process.  UDS is positive for oxycodone.  COVID-19 and influenza testing were negative.  Initial troponin was 0.115 and repeat 2-hour troponin was 0.124.  Patient denies any chest pain or shortness of breath.  Patient was given IV fluid bolus and we ordered Robaxin 750 mg by mouth for muscle spasms and cramps of the legs, as well as IV Valium to help calm agitation.  Discussed the case with Dr. Salvador, ER attending physician.  I will page hospitalist due to elevated troponin and renal failure and recommend case management consult for assistance with detox and programs for addiction.  Patient is very appreciative and agreeable with above treatment plan.  D-dimer is 0.74.  I will discussed this with the " hospitalist.  Due to renal failure and GFR of 23, patient would not be able to have CT angiogram of the chest with contrast. [FC]   0558 Discussed admission with hospitalist, Dr. Bentley.  She is agreeable to admission on telemetry and will consult case management. [FC]      ED Course User Index  [FC] Fernanda Mazariegos PA-C            Recent Results (from the past 24 hour(s))   Comprehensive Metabolic Panel    Collection Time: 12/15/21  2:00 AM    Specimen: Blood   Result Value Ref Range    Glucose 138 (H) 65 - 99 mg/dL    BUN 51 (H) 8 - 23 mg/dL    Creatinine 2.81 (H) 0.76 - 1.27 mg/dL    Sodium 139 136 - 145 mmol/L    Potassium 5.0 3.5 - 5.2 mmol/L    Chloride 105 98 - 107 mmol/L    CO2 23.0 22.0 - 29.0 mmol/L    Calcium 8.0 (L) 8.6 - 10.5 mg/dL    Total Protein 5.8 (L) 6.0 - 8.5 g/dL    Albumin 3.20 (L) 3.50 - 5.20 g/dL    ALT (SGPT) 10 1 - 41 U/L    AST (SGOT) 20 1 - 40 U/L    Alkaline Phosphatase 94 39 - 117 U/L    Total Bilirubin 0.2 0.0 - 1.2 mg/dL    eGFR Non African Amer 23 (L) >60 mL/min/1.73    Globulin 2.6 gm/dL    A/G Ratio 1.2 g/dL    BUN/Creatinine Ratio 18.1 7.0 - 25.0    Anion Gap 11.0 5.0 - 15.0 mmol/L   Troponin    Collection Time: 12/15/21  2:00 AM    Specimen: Blood   Result Value Ref Range    Troponin T 0.115 (C) 0.000 - 0.030 ng/mL   Lipase    Collection Time: 12/15/21  2:00 AM    Specimen: Blood   Result Value Ref Range    Lipase 20 13 - 60 U/L   Ethanol    Collection Time: 12/15/21  2:00 AM    Specimen: Blood   Result Value Ref Range    Ethanol <10 0 - 10 mg/dL   Lactic Acid, Plasma    Collection Time: 12/15/21  2:00 AM    Specimen: Blood   Result Value Ref Range    Lactate 1.2 0.5 - 2.0 mmol/L   Procalcitonin    Collection Time: 12/15/21  2:00 AM    Specimen: Blood   Result Value Ref Range    Procalcitonin 0.08 0.00 - 0.25 ng/mL   Lactate Dehydrogenase    Collection Time: 12/15/21  2:00 AM    Specimen: Blood   Result Value Ref Range     (H) 135 - 225 U/L   C-reactive Protein    Collection  Time: 12/15/21  2:00 AM    Specimen: Blood   Result Value Ref Range    C-Reactive Protein 1.10 (H) 0.00 - 0.50 mg/dL   CBC Auto Differential    Collection Time: 12/15/21  2:38 AM    Specimen: Blood   Result Value Ref Range    WBC 10.95 (H) 3.40 - 10.80 10*3/mm3    RBC 3.77 (L) 4.14 - 5.80 10*6/mm3    Hemoglobin 10.5 (L) 13.0 - 17.7 g/dL    Hematocrit 32.5 (L) 37.5 - 51.0 %    MCV 86.2 79.0 - 97.0 fL    MCH 27.9 26.6 - 33.0 pg    MCHC 32.3 31.5 - 35.7 g/dL    RDW 16.4 (H) 12.3 - 15.4 %    RDW-SD 51.4 37.0 - 54.0 fl    MPV 11.9 6.0 - 12.0 fL    Platelets 236 140 - 450 10*3/mm3    Neutrophil % 74.0 42.7 - 76.0 %    Lymphocyte % 14.3 (L) 19.6 - 45.3 %    Monocyte % 7.8 5.0 - 12.0 %    Eosinophil % 2.8 0.3 - 6.2 %    Basophil % 0.7 0.0 - 1.5 %    Immature Grans % 0.4 0.0 - 0.5 %    Neutrophils, Absolute 8.10 (H) 1.70 - 7.00 10*3/mm3    Lymphocytes, Absolute 1.57 0.70 - 3.10 10*3/mm3    Monocytes, Absolute 0.85 0.10 - 0.90 10*3/mm3    Eosinophils, Absolute 0.31 0.00 - 0.40 10*3/mm3    Basophils, Absolute 0.08 0.00 - 0.20 10*3/mm3    Immature Grans, Absolute 0.04 0.00 - 0.05 10*3/mm3    nRBC 0.0 0.0 - 0.2 /100 WBC   Urinalysis With Microscopic If Indicated (No Culture) - Urine, Clean Catch    Collection Time: 12/15/21  3:44 AM    Specimen: Urine, Clean Catch   Result Value Ref Range    Color, UA Yellow Yellow, Straw    Appearance, UA Clear Clear    pH, UA <=5.0 5.0 - 8.0    Specific Gravity, UA 1.022 1.001 - 1.030    Glucose, UA Negative Negative    Ketones, UA Trace (A) Negative    Bilirubin, UA Negative Negative    Blood, UA Trace (A) Negative    Protein,  mg/dL (2+) (A) Negative    Leuk Esterase, UA Negative Negative    Nitrite, UA Negative Negative    Urobilinogen, UA 1.0 E.U./dL 0.2 - 1.0 E.U./dL   Urine Drug Screen - Urine, Clean Catch    Collection Time: 12/15/21  3:44 AM    Specimen: Urine, Clean Catch   Result Value Ref Range    THC, Screen, Urine Negative Negative    Phencyclidine (PCP), Urine Negative  Negative    Cocaine Screen, Urine Negative Negative    Methamphetamine, Ur Negative Negative    Opiate Screen Negative Negative    Amphetamine Screen, Urine Negative Negative    Benzodiazepine Screen, Urine Negative Negative    Tricyclic Antidepressants Screen Negative Negative    Methadone Screen, Urine Negative Negative    Barbiturates Screen, Urine Negative Negative    Oxycodone Screen, Urine Positive (A) Negative    Propoxyphene Screen Negative Negative    Buprenorphine, Screen, Urine Negative Negative   Urinalysis, Microscopic Only - Urine, Clean Catch    Collection Time: 12/15/21  3:44 AM    Specimen: Urine, Clean Catch   Result Value Ref Range    RBC, UA 3-6 (A) None Seen, 0-2 /HPF    WBC, UA 0-2 None Seen, 0-2 /HPF    Bacteria, UA None Seen None Seen, Trace /HPF    Squamous Epithelial Cells, UA 0-2 None Seen, 0-2 /HPF    Hyaline Casts, UA 0-6 0 - 6 /LPF    Methodology Automated Microscopy    COVID-19 and FLU A/B PCR - Swab, Nasopharynx    Collection Time: 12/15/21  4:41 AM    Specimen: Nasopharynx; Swab   Result Value Ref Range    COVID19 Not Detected Not Detected - Ref. Range    Influenza A PCR Not Detected Not Detected    Influenza B PCR Not Detected Not Detected   D-dimer, Quantitative    Collection Time: 12/15/21  4:45 AM    Specimen: Blood   Result Value Ref Range    D-Dimer, Quantitative 0.74 (H) 0.00 - 0.56 MCGFEU/mL   Troponin    Collection Time: 12/15/21  4:45 AM    Specimen: Blood   Result Value Ref Range    Troponin T 0.124 (C) 0.000 - 0.030 ng/mL     Note: In addition to lab results from this visit, the labs listed above may include labs taken at another facility or during a different encounter within the last 24 hours. Please correlate lab times with ED admission and discharge times for further clarification of the services performed during this visit.    XR Chest 1 View    (Results Pending)     Vitals:    12/15/21 0037 12/15/21 0402 12/15/21 0515   BP: 124/83 136/89 135/89   BP Location: Right  "arm Left arm    Patient Position: Sitting Sitting    Pulse: 76 75 64   Resp: 16 (!) 32 18   Temp: 98.3 °F (36.8 °C)     TempSrc: Oral     SpO2: 98% 92% 93%   Weight: 101 kg (223 lb)     Height: 172.7 cm (68\")       Medications   sodium chloride 0.9 % bolus 1,000 mL (has no administration in time range)   diazePAM (VALIUM) injection 2.5 mg (2.5 mg Intravenous Given 12/15/21 0352)   methocarbamol (ROBAXIN) tablet 750 mg (750 mg Oral Given 12/15/21 0352)   ondansetron (ZOFRAN) injection 4 mg (4 mg Intravenous Given During Downtime 12/15/21 0310)   diazePAM (VALIUM) injection 2.5 mg (2.5 mg Intravenous Given 12/15/21 0440)     ECG/EMG Results (last 24 hours)     Procedure Component Value Units Date/Time    ECG 12 Lead [[id number]] Collected: 12/15/21 0450     Updated: 12/15/21 0451        ECG 12 Lead                                                   [id number] Score (for atrial fibrillation stroke risk) reviewed and/or performed as part of the patient evaluation and treatment planning process.  The result associated with this review/performance is: 3       MDM    Final diagnoses:   Heroin withdrawal (HCC)   Visual hallucination   Nausea   Elevated troponin   Acute renal failure, unspecified acute renal failure type (HCC)   Chronic atrial fibrillation (HCC)   Chronic anticoagulation   History of diabetes mellitus   History of hypertension   History of hyperlipidemia   Tobacco dependence       ED Disposition  ED Disposition     ED Disposition Condition Comment    Decision to Admit            No follow-up provider specified.       Medication List      No changes were made to your prescriptions during this visit.          Fernanda Mazariegos PA-C  12/15/21 0602    "

## 2021-12-15 NOTE — H&P
[facility] Medicine Services  HISTORY AND PHYSICAL    Patient Name: Osito Person  : 1955  MRN: [id number]  Primary Care Physician: Felix Milligan MD  Date of admission: 12/15/2021    Subjective   Subjective     Chief Complaint:  Hallucinations     HPI:  Osito Person is a 65 y.o. male with a past medical history significant for heroin abuse, GERD, COPD, atrial fibrillation on xarelto, diabetes mellitus type 2 and essential hypertension presents to the ED with complaints of hallucinations and symptoms of detox.  Patient reports daily use of heroin over the past 5-6 years.  He reports he currently snorts heroin.  He has never been through a detox program but is desperate for help.  He currently reports his last use was 21.  Over the past few days he has had bilateral leg cramps and intermittent jerking of extremities and restlessness.  Additionally he has complained of dizziness, nausea, and some visual hallucinations and felt like he has seen kids flying in his peripheral vision.  Patient presented to [facility] earlier today and they turned him away.  He then went to [facility] and he was [facility] to [facility].  When [facility] went to [facility], they advised that he needed medical clearance before coming to their facility to detox.  He denies any chest pain, cough, shortness of air, abdominal pain, dysuria or hematuria.  Patient also complains of an open wound to the dorsal aspect of his right foot in which he has been using silvadene cream and appears to be healing.    Upon arrival to the ED, he is found to have elevated troponin x 2. He continues to have spasms and withdrawal symptoms. He has elevated cr+ with unknown baseline. He has mild leukocyosis and is anemic. He is noted to have elevated d-dimer.   He will be admitted to Hospital Medicine for further evaluation.       COVID Details:    Symptoms:    [x] NONE [] Fever []  Cough [] Shortness of breath [] Change  in taste/smell      The patient qualifies to receive the vaccine, but they have not yet received it.      Review of Systems   Constitutional: Positive for activity change, appetite change and fatigue.   HENT: Negative.    Eyes: Negative for photophobia and visual disturbance.   Respiratory: Negative for cough, chest tightness, shortness of breath and wheezing.    Cardiovascular: Negative.    Gastrointestinal: Positive for nausea. Negative for abdominal distention, abdominal pain, blood in stool, constipation, diarrhea and vomiting.   Endocrine: Negative.    Genitourinary: Negative.    Musculoskeletal: Negative.    Skin: Positive for wound.   Allergic/Immunologic: Negative.    Neurological: Positive for tremors and weakness.   Psychiatric/Behavioral: Positive for confusion, hallucinations and sleep disturbance. Negative for suicidal ideas. The patient is nervous/anxious.         All other systems reviewed and are negative.     Personal History     Past Medical History:   Diagnosis Date   • Diabetes mellitus (HCC)    • Hyperlipidemia    • Hypertension        History reviewed. No pertinent surgical history.    Family History: family history is not on file. Otherwise pertinent FHx was reviewed and unremarkable.     Social History:  reports that he has been smoking. He has a 10.00 pack-year smoking history. He does not have any smokeless tobacco history on file. He reports previous alcohol use. He reports current drug use.  Social History     Social History Narrative   • Not on file       Medications:  aspirin, atorvastatin, bumetanide, cetirizine, glimepiride, levothyroxine, lisinopril, metFORMIN, metoprolol succinate XL, montelukast, multivitamin with minerals, omeprazole, rivaroxaban, and spironolactone    No Known Allergies    Objective   Objective     Vital Signs:   Temp:  [98.3 °F (36.8 °C)] 98.3 °F (36.8 °C)  Heart Rate:  [64-76] 64  Resp:  [16-32] 18  BP: (124-136)/(83-89) 135/89    Physical Exam    Constitutional: sleeping and difficulty to awaken initially  Eyes: PERRLA, sclerae anicteric, no conjunctival injection  HENT: NCAT, mucous membranes moist  Neck: Supple, no thyromegaly, no lymphadenopathy, trachea midline  Respiratory: Clear to auscultation bilaterally, nonlabored respirations   Cardiovascular: RRR, no murmurs, rubs, or gallops, palpable pedal pulses bilaterally  Gastrointestinal: Positive bowel sounds, soft, nontender, nondistended  Musculoskeletal: No bilateral ankle edema, no clubbing or cyanosis to extremities  Psychiatric: Appropriate affect, cooperative  Neurologic: Oriented x 3, strength symmetric in all extremities, Cranial Nerves grossly intact to confrontation, speech clear, spastic tremors, reports pain all over  Skin: dorsal wound right foot, right hand wound      Results Reviewed:  I have personally reviewed most recent indicated data and agree with findings including:  [x]  Laboratory  [x]  Radiology  [x]  EKG/Telemetry  []  Pathology  []  Cardiac/Vascular Studies  []  Old records  []  Other:  Most pertinent findings include:      LAB RESULTS:      Lab 12/15/21  0445 12/15/21  0238 12/15/21  0200   WBC  --  10.95*  --    HEMOGLOBIN  --  10.5*  --    HEMATOCRIT  --  32.5*  --    PLATELETS  --  236  --    NEUTROS ABS  --  8.10*  --    IMMATURE GRANS (ABS)  --  0.04  --    LYMPHS ABS  --  1.57  --    MONOS ABS  --  0.85  --    EOS ABS  --  0.31  --    MCV  --  86.2  --    CRP  --   --  1.10*   PROCALCITONIN  --   --  0.08   LACTATE  --   --  1.2   LDH  --   --  238*   D DIMER QUANT 0.74*  --   --          Lab 12/15/21  0200   SODIUM 139   POTASSIUM 5.0   CHLORIDE 105   CO2 23.0   ANION GAP 11.0   BUN 51*   CREATININE 2.81*   GLUCOSE 138*   CALCIUM 8.0*         Lab 12/15/21  0200   TOTAL PROTEIN 5.8*   ALBUMIN 3.20*   GLOBULIN 2.6   ALT (SGPT) 10   AST (SGOT) 20   BILIRUBIN 0.2   ALK PHOS 94   LIPASE 20         Lab 12/15/21  0445 12/15/21  0200   TROPONIN T 0.124* 0.115*                  Brief Urine Lab Results  (Last result in the past 365 days)      Color   Clarity   Blood   Leuk Est   Nitrite   Protein   CREAT   Urine HCG        12/15/21 0344 Yellow   Clear   Trace   Negative   Negative   100 mg/dL (2+)               Microbiology Results (last 10 days)     Procedure Component Value - Date/Time    COVID PRE-OP / PRE-PROCEDURE SCREENING ORDER (NO ISOLATION) - Swab, Nasopharynx [[id number]]  (Normal) Collected: 12/15/21 0441    Lab Status: Final result Specimen: Swab from Nasopharynx Updated: 12/15/21 0512    Narrative:      The following orders were created for panel order COVID PRE-OP / PRE-PROCEDURE SCREENING ORDER (NO ISOLATION) - Swab, Nasopharynx.  Procedure                               Abnormality         Status                     ---------                               -----------         ------                     COVID-19 and FLU A/B PCR...[[id number]]  Normal              Final result                 Please view results for these tests on the individual orders.    COVID-19 and FLU A/B PCR - Swab, Nasopharynx [[id number]]  (Normal) Collected: 12/15/21 0441    Lab Status: Final result Specimen: Swab from Nasopharynx Updated: 12/15/21 0512     COVID19 Not Detected     Influenza A PCR Not Detected     Influenza B PCR Not Detected    Narrative:      Fact sheet for providers: https://www.fda.gov/media/748759/download    Fact sheet for patients: https://www.fda.gov/media/772681/download    Test performed by PCR.          No radiology results from the last 24 hrs        Assessment/Plan   Assessment & Plan       Atrial fibrillation (HCC)    Essential hypertension    COPD (chronic obstructive pulmonary disease) (HCC)    Tobacco use    Type 2 diabetes mellitus (HCC)    Illicit drug use    GERD (gastroesophageal reflux disease)    Hypothyroidism    NSTEMI (non-ST elevated myocardial infarction) (HCC)    Long term current use of anticoagulant therapy    Hyperlipidemia    CKD (chronic kidney disease)  stage 4, GFR 15-29 ml/min ([facility])      65 year old male presents to the ED with withdrawal symptoms from heroin use, seeking help with detox.  Last use of heroin was 21.    Elevated troponin   -troponin 0.115, 0.124  -CXR with no acute pathology   -ASA now and daily   -check FLP, hgb A1c  -EKG with no ST changes, no CP   -d-dimer 0.74   - ECHO EF 65%; LVH; no wall motion abnormality   - Cardiology evaluated; troponin elevation likely related to renal function but recommend consideration of stress test prior to DC     afib   - Continue metoprolol and xarelto per below     Elevated ddimer  ? PE on VQ scan   - anti-Xa low; likely not taking; will increase to xarelto 15 mg BID   - Unable to obtain CTA due to renal function     heroin use  Withdrawal symptoms  -continue supportive care   -consult CM/SS in am   -fall precautions  -cessation counseling provided   -addiction specialist   - PRN valium     PHIL  -unknown baseline creatinine  -currently 2.81; repeat labs pending   -s/p 1L normal saline bolus   -hold nephrotoxic agents  -hold lisinopril, diuretics for now     Diabetes mellitus type 2  - A1C 7.3  -start ldssi   -fingersticks achs      COPD  -CXR with no acute findings   -continuous pulse ox  -keep 02 sat >90%  -scheduled duo-nebs  -continue Singulair     essential hypertension   -continue home lopressor - BP controlled     Hypothyroidism  -continue synthroid   - TSH low but T4 within limits     Hyperlipidemia  -continue statin   -FLP controlled    tobacco abuse  -Tobacco Cessation Counselin minutes of tobacco cessation counseling provided, including but not limited to, risks of ongoing tobacco use, pertinence to current or future health status and availability/examples of cessation resources.  Patient expresses desire to quit.  -nicotine patch     GERD  -PPI         DVT prophylaxis: xarelto      CODE STATUS:  Full code   Code Status (Patient has no pulse and is not breathing): CPR (Attempt to  Resuscitate)  Medical Interventions (Patient has pulse or is breathing): Full Support      This note has been completed as part of a split-shared workflow.     Signature: Electronically signed by ROBERT Desouza, 12/15/21, 6:16 AM EST.  Electronically signed by ROBERT Chaparro, 12/15/21, 6:38 AM EST.        Attending   Admission Attestation       I have seen and examined the patient, performing an independent face-to-face diagnostic evaluation with plan of care reviewed and developed with the advanced practice clinician (APC).      Brief Summary Statement:   Osito Person is a 65 y.o. male history of hypertension, COPD, atrial fibrillation on Xarelto, heroin use who presents with a desire to detox.  Patient states the last use heroin on Sunday.  He was turned away from another ER and then went to [facility] was referred to the [facility].  At the [facility] they stated he needed medical clearance.  Currently black denies chest pain or shortness of breath.  He is sleepy after getting volume for withdrawal symptoms.  Does state he smokes tobacco but denies any alcohol use.  Current drug use of heroin.    Remainder of detailed HPI is as noted by APC and has been reviewed and/or edited by me for completeness.    Attending Physical Exam:  Constitutional: Lethargic but does wake  Eyes: PERRLA, sclerae anicteric, no conjunctival injection  HENT: NCAT, mucous membranes moist  Neck: Supple, no thyromegaly, no lymphadenopathy, trachea midline  Respiratory: Clear to auscultation bilaterally, nonlabored respirations   Cardiovascular: RRR, no murmurs, rubs, or gallops, palpable pedal pulses bilaterally  Gastrointestinal: Positive bowel sounds, soft, nontender, nondistended  Musculoskeletal: No bilateral ankle edema, no clubbing or cyanosis to extremities  Psychiatric: flat affect, cooperative  Neurologic: Oriented x 3, strength symmetric in all extremities, Cranial Nerves grossly intact to confrontation, speech clear  Skin: No  moses      Brief Assessment/Plan :  See detailed assessment and plan developed with APC which I have reviewed and/or edited for completeness.        Admission Status: I believe that this patient meets *INPATIENT status due to acute renal failure, elevated troponin.  I feel patient’s risk for adverse outcomes and need for care warrant INPATIENT evaluation and I predict the patient’s care encounter to likely last beyond 2 midnights.        Elodia Bryant, DO  12/15/21

## 2021-12-15 NOTE — CASE MANAGEMENT/SOCIAL WORK
Continued Stay Note   [facility]     Patient Name: Osito Person  MRN: [id number]  Today's Date: 12/15/2021    Admit Date: 12/15/2021     Discharge Plan     Row Name 12/15/21 1112       Plan    Plan Ongoing    Plan Comments Consult received- thank you.  I attempted to assess in the ED, but he has just received some Ativan, so I did not press the issue and awaken him.  Spoke with his bedside RN, Ion.  I will follow through his hospital course and attempt to engage him in [facility] treatment.    Due to patient's HX of OUD- Naloxone kit -nasal (per JOCELYN Young) provided.                Discharge Codes    No documentation.                     Rylie Langston, RN ,BSN   Addiction Coordinator

## 2021-12-16 ENCOUNTER — APPOINTMENT (OUTPATIENT)
Dept: GENERAL RADIOLOGY | Facility: HOSPITAL | Age: 66
End: 2021-12-16

## 2021-12-16 LAB
ANION GAP SERPL CALCULATED.3IONS-SCNC: 8 MMOL/L (ref 5–15)
ANION GAP SERPL CALCULATED.3IONS-SCNC: 8 MMOL/L (ref 5–15)
BACTERIA UR QL AUTO: ABNORMAL /HPF
BASOPHILS # BLD AUTO: 0.08 10*3/MM3 (ref 0–0.2)
BASOPHILS NFR BLD AUTO: 0.5 % (ref 0–1.5)
BILIRUB UR QL STRIP: NEGATIVE
BUN SERPL-MCNC: 36 MG/DL (ref 8–23)
BUN SERPL-MCNC: 36 MG/DL (ref 8–23)
BUN/CREAT SERPL: 17.7 (ref 7–25)
BUN/CREAT SERPL: 18.9 (ref 7–25)
CALCIUM SPEC-SCNC: 8.7 MG/DL (ref 8.6–10.5)
CALCIUM SPEC-SCNC: 9 MG/DL (ref 8.6–10.5)
CHLORIDE SERPL-SCNC: 103 MMOL/L (ref 98–107)
CHLORIDE SERPL-SCNC: 105 MMOL/L (ref 98–107)
CLARITY UR: CLEAR
CO2 SERPL-SCNC: 23 MMOL/L (ref 22–29)
CO2 SERPL-SCNC: 27 MMOL/L (ref 22–29)
COLOR UR: YELLOW
CREAT SERPL-MCNC: 1.9 MG/DL (ref 0.76–1.27)
CREAT SERPL-MCNC: 2.03 MG/DL (ref 0.76–1.27)
D-LACTATE SERPL-SCNC: 1.3 MMOL/L (ref 0.5–2)
DEPRECATED RDW RBC AUTO: 53.2 FL (ref 37–54)
EOSINOPHIL # BLD AUTO: 0.06 10*3/MM3 (ref 0–0.4)
EOSINOPHIL NFR BLD AUTO: 0.4 % (ref 0.3–6.2)
ERYTHROCYTE [DISTWIDTH] IN BLOOD BY AUTOMATED COUNT: 16.1 % (ref 12.3–15.4)
GFR SERPL CREATININE-BSD FRML MDRD: 33 ML/MIN/1.73
GFR SERPL CREATININE-BSD FRML MDRD: 36 ML/MIN/1.73
GLUCOSE BLDC GLUCOMTR-MCNC: 180 MG/DL (ref 70–130)
GLUCOSE BLDC GLUCOMTR-MCNC: 222 MG/DL (ref 70–130)
GLUCOSE BLDC GLUCOMTR-MCNC: 261 MG/DL (ref 70–130)
GLUCOSE BLDC GLUCOMTR-MCNC: 92 MG/DL (ref 70–130)
GLUCOSE SERPL-MCNC: 180 MG/DL (ref 65–99)
GLUCOSE SERPL-MCNC: 264 MG/DL (ref 65–99)
GLUCOSE UR STRIP-MCNC: ABNORMAL MG/DL
HCT VFR BLD AUTO: 35.7 % (ref 37.5–51)
HGB BLD-MCNC: 10.8 G/DL (ref 13–17.7)
HGB UR QL STRIP.AUTO: ABNORMAL
HYALINE CASTS UR QL AUTO: ABNORMAL /LPF
IMM GRANULOCYTES # BLD AUTO: 0.07 10*3/MM3 (ref 0–0.05)
IMM GRANULOCYTES NFR BLD AUTO: 0.4 % (ref 0–0.5)
KETONES UR QL STRIP: NEGATIVE
LEUKOCYTE ESTERASE UR QL STRIP.AUTO: NEGATIVE
LYMPHOCYTES # BLD AUTO: 0.88 10*3/MM3 (ref 0.7–3.1)
LYMPHOCYTES NFR BLD AUTO: 5.5 % (ref 19.6–45.3)
MCH RBC QN AUTO: 27.2 PG (ref 26.6–33)
MCHC RBC AUTO-ENTMCNC: 30.3 G/DL (ref 31.5–35.7)
MCV RBC AUTO: 89.9 FL (ref 79–97)
MONOCYTES # BLD AUTO: 1.21 10*3/MM3 (ref 0.1–0.9)
MONOCYTES NFR BLD AUTO: 7.6 % (ref 5–12)
NEUTROPHILS NFR BLD AUTO: 13.58 10*3/MM3 (ref 1.7–7)
NEUTROPHILS NFR BLD AUTO: 85.6 % (ref 42.7–76)
NITRITE UR QL STRIP: NEGATIVE
NRBC BLD AUTO-RTO: 0 /100 WBC (ref 0–0.2)
NT-PROBNP SERPL-MCNC: 7665 PG/ML (ref 0–900)
PH UR STRIP.AUTO: 6 [PH] (ref 5–8)
PLATELET # BLD AUTO: 231 10*3/MM3 (ref 140–450)
PMV BLD AUTO: 11.6 FL (ref 6–12)
POTASSIUM SERPL-SCNC: 5.9 MMOL/L (ref 3.5–5.2)
POTASSIUM SERPL-SCNC: 6.3 MMOL/L (ref 3.5–5.2)
POTASSIUM SERPL-SCNC: 6.7 MMOL/L (ref 3.5–5.2)
PROCALCITONIN SERPL-MCNC: 0.08 NG/ML (ref 0–0.25)
PROT UR QL STRIP: ABNORMAL
QT INTERVAL: 364 MS
QTC INTERVAL: 404 MS
RBC # BLD AUTO: 3.97 10*6/MM3 (ref 4.14–5.8)
RBC # UR STRIP: ABNORMAL /HPF
REF LAB TEST METHOD: ABNORMAL
SODIUM SERPL-SCNC: 136 MMOL/L (ref 136–145)
SODIUM SERPL-SCNC: 138 MMOL/L (ref 136–145)
SP GR UR STRIP: 1.01 (ref 1–1.03)
SQUAMOUS #/AREA URNS HPF: ABNORMAL /HPF
UROBILINOGEN UR QL STRIP: ABNORMAL
WBC # UR STRIP: ABNORMAL /HPF
WBC NRBC COR # BLD: 15.88 10*3/MM3 (ref 3.4–10.8)

## 2021-12-16 PROCEDURE — 71045 X-RAY EXAM CHEST 1 VIEW: CPT

## 2021-12-16 PROCEDURE — 63710000001 INSULIN LISPRO (HUMAN) PER 5 UNITS: Performed by: FAMILY MEDICINE

## 2021-12-16 PROCEDURE — 94799 UNLISTED PULMONARY SVC/PX: CPT

## 2021-12-16 PROCEDURE — 84132 ASSAY OF SERUM POTASSIUM: CPT | Performed by: FAMILY MEDICINE

## 2021-12-16 PROCEDURE — 81001 URINALYSIS AUTO W/SCOPE: CPT | Performed by: FAMILY MEDICINE

## 2021-12-16 PROCEDURE — 25010000002 DIAZEPAM PER 5 MG: Performed by: INTERNAL MEDICINE

## 2021-12-16 PROCEDURE — 94660 CPAP INITIATION&MGMT: CPT

## 2021-12-16 PROCEDURE — 63710000001 INSULIN REGULAR HUMAN PER 5 UNITS: Performed by: FAMILY MEDICINE

## 2021-12-16 PROCEDURE — 83605 ASSAY OF LACTIC ACID: CPT | Performed by: FAMILY MEDICINE

## 2021-12-16 PROCEDURE — 63710000001 INSULIN LISPRO (HUMAN) PER 5 UNITS: Performed by: PHYSICIAN ASSISTANT

## 2021-12-16 PROCEDURE — 83880 ASSAY OF NATRIURETIC PEPTIDE: CPT | Performed by: INTERNAL MEDICINE

## 2021-12-16 PROCEDURE — 85025 COMPLETE CBC W/AUTO DIFF WBC: CPT | Performed by: PHYSICIAN ASSISTANT

## 2021-12-16 PROCEDURE — 93010 ELECTROCARDIOGRAM REPORT: CPT | Performed by: INTERNAL MEDICINE

## 2021-12-16 PROCEDURE — 94761 N-INVAS EAR/PLS OXIMETRY MLT: CPT

## 2021-12-16 PROCEDURE — 99232 SBSQ HOSP IP/OBS MODERATE 35: CPT | Performed by: INTERNAL MEDICINE

## 2021-12-16 PROCEDURE — 25010000002 FUROSEMIDE PER 20 MG: Performed by: INTERNAL MEDICINE

## 2021-12-16 PROCEDURE — 80048 BASIC METABOLIC PNL TOTAL CA: CPT | Performed by: FAMILY MEDICINE

## 2021-12-16 PROCEDURE — 82962 GLUCOSE BLOOD TEST: CPT

## 2021-12-16 PROCEDURE — 80048 BASIC METABOLIC PNL TOTAL CA: CPT | Performed by: PHYSICIAN ASSISTANT

## 2021-12-16 PROCEDURE — 99233 SBSQ HOSP IP/OBS HIGH 50: CPT | Performed by: FAMILY MEDICINE

## 2021-12-16 PROCEDURE — 93005 ELECTROCARDIOGRAM TRACING: CPT | Performed by: FAMILY MEDICINE

## 2021-12-16 PROCEDURE — 84145 PROCALCITONIN (PCT): CPT | Performed by: FAMILY MEDICINE

## 2021-12-16 PROCEDURE — 25010000002 CALCIUM GLUCONATE PER 10 ML: Performed by: FAMILY MEDICINE

## 2021-12-16 RX ORDER — DEXTROSE MONOHYDRATE 25 G/50ML
50 INJECTION, SOLUTION INTRAVENOUS ONCE
Status: COMPLETED | OUTPATIENT
Start: 2021-12-16 | End: 2021-12-16

## 2021-12-16 RX ORDER — FUROSEMIDE 10 MG/ML
40 INJECTION INTRAMUSCULAR; INTRAVENOUS EVERY 12 HOURS
Status: COMPLETED | OUTPATIENT
Start: 2021-12-16 | End: 2021-12-17

## 2021-12-16 RX ADMIN — ATORVASTATIN CALCIUM 10 MG: 10 TABLET, FILM COATED ORAL at 09:29

## 2021-12-16 RX ADMIN — PANTOPRAZOLE SODIUM 40 MG: 40 TABLET, DELAYED RELEASE ORAL at 09:28

## 2021-12-16 RX ADMIN — FUROSEMIDE 40 MG: 10 INJECTION, SOLUTION INTRAMUSCULAR; INTRAVENOUS at 18:40

## 2021-12-16 RX ADMIN — DEXTROSE MONOHYDRATE 50 ML: 25 INJECTION, SOLUTION INTRAVENOUS at 12:37

## 2021-12-16 RX ADMIN — IPRATROPIUM BROMIDE AND ALBUTEROL SULFATE 3 ML: 2.5; .5 SOLUTION RESPIRATORY (INHALATION) at 22:43

## 2021-12-16 RX ADMIN — MONTELUKAST SODIUM 10 MG: 10 TABLET, COATED ORAL at 20:20

## 2021-12-16 RX ADMIN — CALCIUM GLUCONATE 1 G: 98 INJECTION, SOLUTION INTRAVENOUS at 12:37

## 2021-12-16 RX ADMIN — SODIUM CHLORIDE, PRESERVATIVE FREE 10 ML: 5 INJECTION INTRAVENOUS at 09:30

## 2021-12-16 RX ADMIN — LEVOTHYROXINE SODIUM 175 MCG: 150 TABLET ORAL at 05:32

## 2021-12-16 RX ADMIN — INSULIN HUMAN 10 UNITS: 100 INJECTION, SOLUTION PARENTERAL at 16:00

## 2021-12-16 RX ADMIN — GABAPENTIN 400 MG: 400 CAPSULE ORAL at 18:40

## 2021-12-16 RX ADMIN — RIVAROXABAN 15 MG: 15 TABLET, FILM COATED ORAL at 09:29

## 2021-12-16 RX ADMIN — DIAZEPAM 2.5 MG: 5 INJECTION, SOLUTION INTRAMUSCULAR; INTRAVENOUS at 09:31

## 2021-12-16 RX ADMIN — METOPROLOL SUCCINATE 25 MG: 25 TABLET, EXTENDED RELEASE ORAL at 09:28

## 2021-12-16 RX ADMIN — RIVAROXABAN 15 MG: 15 TABLET, FILM COATED ORAL at 18:41

## 2021-12-16 RX ADMIN — CETIRIZINE HYDROCHLORIDE 10 MG: 10 TABLET, FILM COATED ORAL at 09:32

## 2021-12-16 RX ADMIN — INSULIN HUMAN 10 UNITS: 100 INJECTION, SOLUTION PARENTERAL at 12:41

## 2021-12-16 RX ADMIN — DIAZEPAM 2.5 MG: 5 INJECTION, SOLUTION INTRAMUSCULAR; INTRAVENOUS at 03:28

## 2021-12-16 RX ADMIN — INSULIN LISPRO 2 UNITS: 100 INJECTION, SOLUTION INTRAVENOUS; SUBCUTANEOUS at 17:52

## 2021-12-16 RX ADMIN — INSULIN LISPRO 3 UNITS: 100 INJECTION, SOLUTION INTRAVENOUS; SUBCUTANEOUS at 17:52

## 2021-12-16 RX ADMIN — DIAZEPAM 2.5 MG: 5 INJECTION, SOLUTION INTRAMUSCULAR; INTRAVENOUS at 16:53

## 2021-12-16 RX ADMIN — SODIUM CHLORIDE, PRESERVATIVE FREE 10 ML: 5 INJECTION INTRAVENOUS at 20:20

## 2021-12-16 RX ADMIN — GABAPENTIN 400 MG: 400 CAPSULE ORAL at 09:28

## 2021-12-16 RX ADMIN — IPRATROPIUM BROMIDE AND ALBUTEROL SULFATE 3 ML: 2.5; .5 SOLUTION RESPIRATORY (INHALATION) at 06:22

## 2021-12-16 RX ADMIN — IPRATROPIUM BROMIDE AND ALBUTEROL SULFATE 3 ML: 2.5; .5 SOLUTION RESPIRATORY (INHALATION) at 16:14

## 2021-12-16 RX ADMIN — DEXTROSE MONOHYDRATE 50 ML: 25 INJECTION, SOLUTION INTRAVENOUS at 16:46

## 2021-12-16 RX ADMIN — SODIUM ZIRCONIUM CYCLOSILICATE 10 G: 10 POWDER, FOR SUSPENSION ORAL at 14:00

## 2021-12-16 RX ADMIN — GABAPENTIN 400 MG: 400 CAPSULE ORAL at 20:20

## 2021-12-16 RX ADMIN — SODIUM BICARBONATE 50 MEQ: 84 INJECTION, SOLUTION INTRAVENOUS at 12:37

## 2021-12-16 NOTE — NURSING NOTE
Woc consulted for: Open area to right foot    Wound/ Skin Assessment:         Patient has no open areas on right foot.  Dorsal aspect of right foot contains a scab that is a light yellowish-brown.  Patient picked some of this off and there is new pink skin underneath.    Intervention performed: Woc painted with Betadine and let dry    Recommendations: Recommend Betadine.      Patient was applying Silvadene per Dr. Milligan but at this time Woc does not see anything that indicates Silvadene cream.  Typically encrustations like this need to be moistened up, at some future point Xeroform might be in order but at this time Betadine is sufficient.  Worrisome is that patient was picking at scab Woc encouraged patient to let it fall off on its own.    Head to toe Ax performed.    Additional skin issues: None noted.  Patient however was struggling to breathe sitting upright.  Woc noticed that patient has a mews score 19 and Woc noticed that Gt was scored at 19.  Woc will lower Gt score so that pressure injury prevention interventions will be in place because patient is definitely at risk for pressure injuries.  See medical problem Hospital problem list for all the reasons why.    Skin interventions in place.    Pressure Injury Protocol (initiate for Gt Score of 18 or less):   *Maintain good skin care, keep dry, turn q 2 hr, keep heels elevated and offloaded with heel boots.    *Apply z-guard to sacrococcygeal area/ perineal area BID or daily and PRN per incontinent episodes.  *Follow C.A.R.E protocol if medical devices (Bipap, lauren, Ng tube, etc) are being used.     Specialty bed: Not at this time.  Patient condition deteriorates please call Woc.    Woc will and off.    Please contact with questions or concerns.    Dragon disclaimer:  This note was entered via electronic voice recognition/ transcription prorgram. The electronic translation of spoken language may permit erroneous, or at times, nonsensical words  or phrases to be inadvertently transcribed; Although I have reviewed the note for such errors, some may still exist.

## 2021-12-16 NOTE — CASE MANAGEMENT/SOCIAL WORK
Discharge Planning Assessment  [facility]     Patient Name: Osito Person  MRN: [id number]  Today's Date: 12/16/2021    Admit Date: 12/15/2021     Discharge Needs Assessment     Row Name 12/16/21 0812       Living Environment    Lives With alone    Current Living Arrangements home/apartment/condo    Primary Care Provided by self    Provides Primary Care For no one    Family Caregiver if Needed other relative(s)    Family Caregiver Names Blil Burnett (relative) [phone]    Able to Return to Prior Arrangements yes       Resource/Environmental Concerns    Resource/Environmental Concerns none    Transportation Concerns car, none       Transition Planning    Patient/Family Anticipates Transition to inpatient rehabilitation facility    Patient/Family Anticipated Services at Transition none    Transportation Anticipated family or friend will provide       Discharge Needs Assessment    Readmission Within the Last 30 Days no previous admission in last 30 days    Current Outpatient/Agency/Support Group inpatient rehabilitation facility    Equipment Currently Used at Home cpap    Concerns to be Addressed substance/tobacco abuse/use    Anticipated Changes Related to Illness none    Equipment Needed After Discharge none    Outpatient/Agency/Support Group Needs outpatient substance abuse treatment    Discharge Facility/Level of Care Needs substance abuse facility               Discharge Plan     Row Name 12/16/21 0813       Plan    Plan TBD    Patient/Family in Agreement with Plan yes    Plan Comments Spoke to patient at bedside. Lives alone in [facility]. Contact is Bill Burnett (relative) [phone]. Is independent with ADL's. No problems with [employer] Medicare. Uses a CPAP at home. No advanced directives. Plan is substance abuse rehab at discharge. Rylie Langston is following. CM will continue to follow.    Final Discharge Disposition Code 70 - other health care institution not elsewhere defined              Continued Care and  Services - Admitted Since 12/15/2021    Coordination has not been started for this encounter.          Demographic Summary     Row Name 12/16/21 0811       General Information    Admission Type inpatient    Arrived From emergency department    Referral Source admission list    Reason for Consult discharge planning    Preferred Language English     Used During This Interaction no       Contact Information    Permission Granted to Share Info With     Contact Information Obtained for     Contact Information Comments PCP is wayne Milligan MD               Functional Status     Row Name 12/16/21 0812       Functional Status    Usual Activity Tolerance good    Current Activity Tolerance moderate       Functional Status, IADL    Medications independent    Meal Preparation independent    Housekeeping independent    Laundry independent    Shopping independent       Mental Status    General Appearance WDL WDL       Mental Status Summary    Recent Changes in Mental Status/Cognitive Functioning no changes       Employment/    Employment Status disabled               Psychosocial    No documentation.                Abuse/Neglect    No documentation.                Legal    No documentation.                Substance Abuse    No documentation.                Patient Forms    No documentation.                   Ludwin Rehman RN

## 2021-12-16 NOTE — PROGRESS NOTES
[facility]  IP Progress Note      Chief Complaint/Reason for service: #1 chronic A. fib #2 elevated troponin    Subjective   Subjective: The patient was sleeping but awakens.  He denies any history of exertional chest pain or neck pain at home.  States he gets short of breath pretty easily.  The patient now has resting tachypnea and dyspnea    Past medical, surgical, social and family history reviewed in the patient's electronic medical record.    Objective     Vital Sign Min/Max for last 24 hours  Temp  Min: 96 °F (35.6 °C)  Max: 97.7 °F (36.5 °C)   BP  Min: 112/60  Max: 161/83   Pulse  Min: 57  Max: 125   Resp  Min: 16  Max: 28   SpO2  Min: 78 %  Max: 100 %   Flow (L/min)  Min: 2  Max: 2      Intake/Output Summary (Last 24 hours) at 12/16/2021 0724  Last data filed at 12/16/2021 0600  Gross per 24 hour   Intake 1421 ml   Output 2100 ml   Net -679 ml             Current Facility-Administered Medications:   •  !Home medications stored in central pharmacy. Please obtain prior to patient discharge, , Does not apply, BID, David Jennings, PharmD  •  atorvastatin (LIPITOR) tablet 10 mg, 10 mg, Oral, Daily, Sincere Alford PA-C, 10 mg at 12/15/21 0931  •  cetirizine (zyrTEC) tablet 10 mg, 10 mg, Oral, Daily, Sincere Alford PA-C, 10 mg at 12/15/21 0931  •  dextrose (D50W) (25 g/50 mL) IV injection 25 g, 25 g, Intravenous, Q15 Min PRN, Sincere Alford PA-C  •  dextrose (GLUTOSE) oral gel 15 g, 15 g, Oral, Q15 Min PRN, Sincere Alford PA-C  •  diazePAM (VALIUM) injection 2.5 mg, 2.5 mg, Intravenous, Q4H PRN, Elodia Bryant DO, 2.5 mg at 12/16/21 0328  •  gabapentin (NEURONTIN) capsule 400 mg, 400 mg, Oral, 4x Daily, Meli Cramer MD, 400 mg at 12/15/21 3747  •  glucagon (human recombinant) (GLUCAGEN DIAGNOSTIC) injection 1 mg, 1 mg, Subcutaneous, Q15 Min MANUELN, Sincere Alford PA-C  •  insulin lispro (humaLOG) injection 0-7 Units, 0-7  Units, Subcutaneous, TID AC, Sincere Alford PA-C  •  ipratropium-albuterol (DUO-NEB) nebulizer solution 3 mL, 3 mL, Nebulization, Q4H PRN, Sincere Alford PA-C, 3 mL at 12/16/21 0622  •  levothyroxine (SYNTHROID, LEVOTHROID) tablet 175 mcg, 175 mcg, Oral, Q AM, Sincere Alford PA-C, 175 mcg at 12/16/21 0532  •  metoprolol succinate XL (TOPROL-XL) 24 hr tablet 25 mg, 25 mg, Oral, Daily, Sincere Alford PA-C, 25 mg at 12/15/21 0931  •  montelukast (SINGULAIR) tablet 10 mg, 10 mg, Oral, Nightly, Sincere Alford PA-C, 10 mg at 12/15/21 2030  •  nicotine (NICODERM CQ) 21 MG/24HR patch 1 patch, 1 patch, Transdermal, Q24H, Sincere Alford PA-C, 1 patch at 12/15/21 0934  •  pantoprazole (PROTONIX) EC tablet 40 mg, 40 mg, Oral, QAM, Sincere Alford PA-C, 40 mg at 12/15/21 0931  •  rivaroxaban (XARELTO) tablet 15 mg, 15 mg, Oral, BID With Meals, 15 mg at 12/15/21 1704 **FOLLOWED BY** [START ON 1/5/2022] rivaroxaban (XARELTO) tablet 20 mg, 20 mg, Oral, Daily With Dinner, Elodia Bryant, DO  •  sodium chloride 0.45 % infusion, 75 mL/hr, Intravenous, Continuous, Gregory Castanon MD, Last Rate: 75 mL/hr at 12/15/21 1331, 75 mL/hr at 12/15/21 1331  •  sodium chloride 0.9 % bolus 1,000 mL, 1,000 mL, Intravenous, Once, Drew Salvador MD, Stopped at 12/15/21 0634  •  sodium chloride 0.9 % flush 10 mL, 10 mL, Intravenous, Q12H, Sincere Alford PA-C, 10 mL at 12/15/21 2030  •  sodium chloride 0.9 % flush 10 mL, 10 mL, Intravenous, PRN, Sincere Alford PA-C    Physical Exam: General overweight male lying on his left side at a 60 degree angle with resting tachypnea and dyspnea sat 95%        HEENT: Mild JVP is present, nasal O2 is present, no icterus       Respiratory: Equal bilateral symmetrical expansion with decreased breath sounds some late expiratory wheezes and few crackles       Cardiovascular: Regular rate and rhythm with a grade 3  over systolic ejection murmur no edema to palpation       GI: Soft and flat       Lower Extremities: There is a superficial open wound on the dorsum of his right foot       Neuro: Facial expressions are symmetrical.  Moves all 4 extremities       Skin: Warm and dry with no edema to palpation       Psych: Flat affect but appears to answer questions appropriately    Results Review: The patient's creatinine was repeated yesterday and was down to 2.2 from 2.8.  Today's blood work is pending.  Output exceeds intake by 679 mL.  Heart rate 57-1 25.  Blood pressures 1 12-1 61    Radiology Results:  Imaging Results (Last 72 Hours)     Procedure Component Value Units Date/Time    NM Lung Ventilation Perfusion [[id number]] Collected: 12/15/21 0844     Updated: 12/15/21 1657    Narrative:      EXAMINATION: NM LUNG VENTILATION PERFUSION-     INDICATION: Elevated D-dimer; F11.23-Opioid dependence with withdrawal;  R44.1-Visual hallucinations; R11.0-Nausea; R77.8-Other specified  abnormalities of plasma proteins; N17.9-Acute kidney failure,  unspecified; I48.20-Chronic atrial fibrillation, unspecified;  Z79.01-Long term (current) use of anticoagulants; Z86.39-Personal  history of other endocrine, nutritional and metabolic disease; shortness  of breath.     TECHNIQUE: 5.5 mCi of technetium 99m MAA was injected intravenously.  Imaging was obtained of the lung fields in the anterior, posterior, and  oblique projections.     COMPARISON: Chest x-ray dated the same date.     FINDINGS: There are two small subsegmental defects identified within the  right lung in the inferior and lateral aspect. There is homogeneous  uptake of tracer seen within the left lung. The heart is enlarged. No  pleural effusion or pneumothorax.       Impression:      Intermediate probability for pulmonary embolism with two  small subsegmental defects identified one in the right mid lung in the  lateral aspect and the second in the right lower lung.     D:   12/15/2021  E:  12/15/2021         This report was finalized on 12/15/2021 4:53 PM by Dr. Shira Parra MD.       XR Chest 1 View [[id number]] Collected: 12/15/21 0832     Updated: 12/15/21 1656    Narrative:      EXAMINATION: XR CHEST 1 VW-      INDICATION: Shortness of air.     COMPARISON: None.     FINDINGS: Portable chest reveals cardiac and mediastinal silhouettes  within normal limits. The lung fields are grossly clear. No focal  parenchymal opacification is present.  No pleural effusion or  pneumothorax. The bony structures are unremarkable. Pulmonary  vascularity is within normal limits.           Impression:      No acute cardiopulmonary disease.     D:  12/15/2021  E:  12/15/2021     This report was finalized on 12/15/2021 4:53 PM by Dr. Shira Parra MD.             EKG: A. fib controlled ventricular response no ischemia    ECHO: Normal wall motion EF with LVH    Tele: No significant bradycardia or pauses    Assessment   Assessment/Plan: 1 chronic atrial fibrillation-patient is on metoprolol and Xarelto  2 elevated troponin-we will need a nuclear perfusion scan prior to discharge  3 the patient has tachypnea at rest and is now on oxygen.  He has JVP and some wheezes and crackles.  I will DC his IV fluids now and check a BNP.  If his BNP is elevated he will need to be diuresed    Gregory Castanon MD  12/16/21  07:24 EST

## 2021-12-16 NOTE — PROGRESS NOTES
[facility] Medicine Services  PROGRESS NOTE    Patient Name: Osito Person  : 1955  MRN: [id number]    Date of Admission: 12/15/2021  Primary Care Physician: Felix Milligan MD    Subjective   Subjective     CC:  F/U a fib, heroin abuse     HPI:  Patient seen and examined. RN notes reviewed. No acute events overnight. Patient denies CP or SOB. He wants to go to rehab for his drug use.     ROS:  Gen- No fevers, chills  CV- No chest pain, palpitations  Resp- No cough, dyspnea  GI- No N/V/D, abd pain    Objective   Objective     Vital Signs:   Temp:  [96 °F (35.6 °C)-100.2 °F (37.9 °C)] 100.2 °F (37.9 °C)  Heart Rate:  [] 88  Resp:  [16-28] 18  BP: (112-161)/(57-94) 145/83  Flow (L/min):  [2] 2     Physical Exam:  Constitutional: No acute distress, awake, alert, chronically ill appearing   HENT: NCAT, mucous membranes moist, +JVP  Respiratory: Decreased in bases bilaterally with wheezes/rales, respiratory effort normal 2L NC  Cardiovascular: RRR, no murmurs, rubs, or gallops  Gastrointestinal: Positive bowel sounds, soft, nontender, nondistended  Musculoskeletal: +bilateral ankle edema  Psychiatric: Appropriate affect, cooperative  Neurologic: Oriented x 3,  speech clear, no focal deficits   Skin: No rashes    Results Reviewed:  LAB RESULTS:      Lab 21  1045 12/15/21  1332 12/15/21  0445 12/15/21  0238 12/15/21  0200   WBC 15.88*  --   --  10.95*  --    HEMOGLOBIN 10.8*  --   --  10.5*  --    HEMATOCRIT 35.7*  --   --  32.5*  --    PLATELETS 231  --   --  236  --    NEUTROS ABS 13.58*  --   --  8.10*  --    IMMATURE GRANS (ABS) 0.07*  --   --  0.04  --    LYMPHS ABS 0.88  --   --  1.57  --    MONOS ABS 1.21*  --   --  0.85  --    EOS ABS 0.06  --   --  0.31  --    MCV 89.9  --   --  86.2  --    CRP  --   --   --   --  1.10*   PROCALCITONIN  --   --   --   --  0.08   LACTATE  --   --   --   --  1.2   LDH  --   --   --   --  238*   HEPARIN ANTI-XA  --  0.10*  --   --    --    D DIMER QUANT  --   --  0.74*  --   --          Lab 12/16/21  1045 12/15/21  1332 12/15/21  0445 12/15/21  0238 12/15/21  0200   SODIUM 136 141  --   --  139   POTASSIUM 6.7* 5.5*  --   --  5.0   CHLORIDE 105 109*  --   --  105   CO2 23.0 24.0  --   --  23.0   ANION GAP 8.0 8.0  --   --  11.0   BUN 36* 44*  --   --  51*   CREATININE 1.90* 2.22*  --   --  2.81*   GLUCOSE 180* 77  --   --  138*   CALCIUM 8.7 8.2*  --   --  8.0*   MAGNESIUM  --   --  1.8  --   --    HEMOGLOBIN A1C  --   --   --  7.30*  --    TSH  --   --  0.120*  --   --          Lab 12/15/21  0200   TOTAL PROTEIN 5.8*   ALBUMIN 3.20*   GLOBULIN 2.6   ALT (SGPT) 10   AST (SGOT) 20   BILIRUBIN 0.2   ALK PHOS 94   LIPASE 20         Lab 12/16/21  1045 12/15/21  0445 12/15/21  0200   PROBNP 7,665.0*  --   --    TROPONIN T  --  0.124* 0.115*         Lab 12/15/21  0445   CHOLESTEROL 92   LDL CHOL 39   HDL CHOL 41   TRIGLYCERIDES 48             Brief Urine Lab Results  (Last result in the past 365 days)      Color   Clarity   Blood   Leuk Est   Nitrite   Protein   CREAT   Urine HCG        12/15/21 0456             170.2               Microbiology Results Abnormal     Procedure Component Value - Date/Time    Eosinophil Smear - Urine, Urine, Clean Catch [[id number]]  (Normal) Collected: 12/15/21 0344    Lab Status: Final result Specimen: Urine, Clean Catch Updated: 12/15/21 1000     Eosinophil Smear 0 % EOS/100 Cells     Narrative:      No eosinophil seen    COVID PRE-OP / PRE-PROCEDURE SCREENING ORDER (NO ISOLATION) - Swab, Nasopharynx [[id number]]  (Normal) Collected: 12/15/21 0441    Lab Status: Final result Specimen: Swab from Nasopharynx Updated: 12/15/21 0512    Narrative:      The following orders were created for panel order COVID PRE-OP / PRE-PROCEDURE SCREENING ORDER (NO ISOLATION) - Swab, Nasopharynx.  Procedure                               Abnormality         Status                     ---------                               -----------          ------                     COVID-19 and FLU A/B PCR...[[id number]]  Normal              Final result                 Please view results for these tests on the individual orders.    COVID-19 and FLU A/B PCR - Swab, Nasopharynx [[id number]]  (Normal) Collected: 12/15/21 0441    Lab Status: Final result Specimen: Swab from Nasopharynx Updated: 12/15/21 0512     COVID19 Not Detected     Influenza A PCR Not Detected     Influenza B PCR Not Detected    Narrative:      Fact sheet for providers: https://www.fda.gov/media/947143/download    Fact sheet for patients: https://www.fda.gov/media/826705/download    Test performed by PCR.          Adult Transthoracic Echo Complete W/ Cont if Necessary Per Protocol    Result Date: 12/15/2021  · Left ventricular systolic function is normal. Estimated left ventricular EF = 65%. · Left ventricular wall thickness is consistent with borderline concentric hypertrophy. · Moderate mitral valve regurgitation is present. · Moderate tricuspid valve regurgitation is present. · Estimated right ventricular systolic pressure from tricuspid regurgitation is moderately elevated (45-55 mmHg).      NM Lung Ventilation Perfusion    Result Date: 12/15/2021  EXAMINATION: NM LUNG VENTILATION PERFUSION-  INDICATION: Elevated D-dimer; F11.23-Opioid dependence with withdrawal; R44.1-Visual hallucinations; R11.0-Nausea; R77.8-Other specified abnormalities of plasma proteins; N17.9-Acute kidney failure, unspecified; I48.20-Chronic atrial fibrillation, unspecified; Z79.01-Long term (current) use of anticoagulants; Z86.39-Personal history of other endocrine, nutritional and metabolic disease; shortness of breath.  TECHNIQUE: 5.5 mCi of technetium 99m MAA was injected intravenously. Imaging was obtained of the lung fields in the anterior, posterior, and oblique projections.  COMPARISON: Chest x-ray dated the same date.  FINDINGS: There are two small subsegmental defects identified within the right lung in the  inferior and lateral aspect. There is homogeneous uptake of tracer seen within the left lung. The heart is enlarged. No pleural effusion or pneumothorax.      Impression: Intermediate probability for pulmonary embolism with two small subsegmental defects identified one in the right mid lung in the lateral aspect and the second in the right lower lung.  D:  12/15/2021 E:  12/15/2021    This report was finalized on 12/15/2021 4:53 PM by Dr. Shira Parra MD.      XR Chest 1 View    Result Date: 12/15/2021  EXAMINATION: XR CHEST 1 VW-  INDICATION: Shortness of air.  COMPARISON: None.  FINDINGS: Portable chest reveals cardiac and mediastinal silhouettes within normal limits. The lung fields are grossly clear. No focal parenchymal opacification is present.  No pleural effusion or pneumothorax. The bony structures are unremarkable. Pulmonary vascularity is within normal limits.         Impression: No acute cardiopulmonary disease.  D:  12/15/2021 E:  12/15/2021  This report was finalized on 12/15/2021 4:53 PM by Dr. Shira Parra MD.        Results for orders placed during the hospital encounter of 12/15/21    Adult Transthoracic Echo Complete W/ Cont if Necessary Per Protocol    Interpretation Summary  · Left ventricular systolic function is normal. Estimated left ventricular EF = 65%.  · Left ventricular wall thickness is consistent with borderline concentric hypertrophy.  · Moderate mitral valve regurgitation is present.  · Moderate tricuspid valve regurgitation is present.  · Estimated right ventricular systolic pressure from tricuspid regurgitation is moderately elevated (45-55 mmHg).      I have reviewed the medications:  Scheduled Meds:Pharmacy Consult, , Does not apply, BID  atorvastatin, 10 mg, Oral, Daily  cetirizine, 10 mg, Oral, Daily  furosemide, 40 mg, Intravenous, Q12H  gabapentin, 400 mg, Oral, 4x Daily  insulin lispro, 0-7 Units, Subcutaneous, TID AC  levothyroxine, 175 mcg, Oral, Q  AM  metoprolol succinate XL, 25 mg, Oral, Daily  montelukast, 10 mg, Oral, Nightly  nicotine, 1 patch, Transdermal, Q24H  pantoprazole, 40 mg, Oral, QAM  rivaroxaban, 15 mg, Oral, BID With Meals   Followed by  [START ON 1/5/2022] rivaroxaban, 20 mg, Oral, Daily With Dinner  sodium chloride, 1,000 mL, Intravenous, Once  sodium chloride, 10 mL, Intravenous, Q12H  sodium zirconium cyclosilicate, 10 g, Oral, Once      Continuous Infusions:   PRN Meds:.dextrose  •  dextrose  •  diazePAM  •  glucagon (human recombinant)  •  ipratropium-albuterol  •  sodium chloride    Assessment/Plan   Assessment & Plan     Active Hospital Problems    Diagnosis  POA   • Atrial fibrillation ([facility]) [I48.91]  Yes   • Essential hypertension [I10]  Yes   • COPD (chronic obstructive pulmonary disease) ([facility]) [J44.9]  Yes   • Tobacco use [Z72.0]  Yes   • Type 2 diabetes mellitus ([facility]) [E11.9]  Yes   • Illicit drug use [F19.90]  Yes   • GERD (gastroesophageal reflux disease) [K21.9]  Yes   • Hypothyroidism [E03.9]  Yes   • NSTEMI (non-ST elevated myocardial infarction) ([facility]) [I21.4]  Yes   • Long term current use of anticoagulant therapy [Z79.01]  Not Applicable   • Hyperlipidemia [E78.5]  Yes   • CKD (chronic kidney disease) stage 4, GFR 15-29 ml/min ([facility]) [N18.4]  Yes   • Heroin withdrawal ([facility]) [F11.23]  Yes      Resolved Hospital Problems   No resolved problems to display.        Brief Hospital Course to date:  Osito Person is a 65 y.o. male presents to the ED with withdrawal symptoms from heroin use, seeking help with detox.  Last use of heroin was Sunday 12/12/21.    This patient's problems and plans were partially entered by my partner and updated as appropriate by me 12/16/21.  All problems are new to me today      Elevated troponin   -Cardiology following  -Plan for nuclear perfusion scan prior to DC, will order for the AM     NORRIS  Edema   -Echo with EF 65%, mod MR, TR  -proBNP elevatd at 7,665  -Patient received IVF overnight, These have  been DC'd  -IV Lasix 40mg q12 x 2 doses per Cards      Chronic Afib   - Continue Metoprolol and Xarelto      Elevated D dimer  Questionable PE on VQ scan   - anti-Xa low; likely not taking;  Start Xarelto 15 mg BID for 21 days then 20mg daily   - Unable to obtain CTA due to renal function      Heroin use  Withdrawal symptoms  -Continue supportive care   -Chemical Dependency to follow   - PRN valium   -Pt interested in inpatient rehab      PHIL  -unknown baseline creatinine  -Cr 2.8 on admission, now 1.9  -s/p 1L normal saline bolus   -hold nephrotoxic agents  -hold lisinopril, diuretics for now     Hyperkalemia  -S/P Lokelma, Dextrose/Insulin, Calcium gluconate, sodium bicarb  -Will order repeat K+ this afternoon     Leukocytosis  Low grade temp (100.2)  Cough  -Repeat CXR and UA   -Obtain procal and lactic   -Repeat CBC w/diff in AM      Diabetes mellitus type 2  - A1C 7.3  -Continue LDSSI   -fingersticks achs   -Add 3 units Humalog with meals      COPD  -CXR with no acute findings   -continuous pulse ox  -keep 02 sat >90%  -scheduled duo-nebs  -continue Singulair      Essential hypertension   -continue home lopressor      Hypothyroidism  -continue synthroid   -TSH low but T4 within limits      Hyperlipidemia  -continue statin   -FLP controlled     Tobacco abuse  -Counseled regarding cessation     DVT prophylaxis:  Medical DVT prophylaxis orders are present.       AM-PAC 6 Clicks Score (PT): 23 (12/15/21 1601)    Disposition: I expect the patient to be discharged TBD. Wants to go to inpatient rehab for heroin.     CODE STATUS:   Code Status and Medical Interventions:   Ordered at: 12/15/21 0616     Code Status (Patient has no pulse and is not breathing):    CPR (Attempt to Resuscitate)     Medical Interventions (Patient has pulse or is breathing):    Full Support       Prachi Mendoza, DO  12/16/21

## 2021-12-16 NOTE — CONSULTS
Diabetes Education    Patient Name:  Osito Person  YOB: 1955  MRN: [id number]  Admit Date:  12/15/2021        Diabetes education consult noted; chart reviewed and noted A1c 7.3 and pt is prescribed metformin and trulicity at home. Attempted to see pt, pt sleeping in dark room. Will re-attempt tomorrow.       Electronically signed by:  Mary Boucher RN, [facility]  12/16/21 15:20 EST

## 2021-12-16 NOTE — CASE MANAGEMENT/SOCIAL WORK
Continued Stay Note   [facility]     Patient Name: Osito Person  MRN: [id number]  Today's Date: 12/16/2021    Admit Date: 12/15/2021     Discharge Plan     Row Name 12/16/21 1804       Plan    Plan Ongoing    Plan Comments Pt reports that he would like to go to inpatient [facility] treatment- however, his insurance is Medicare, and there are no options for inpatient [facility] treatment after a medical detox in the hospital.  I will discuss some options with him for [facility] care outside of inpatient.  His nephew was at the bedside at time of rounding this evening, so I did not discuss options with the patient at this time, I will come and see again tomorrow and co-create a workable treatment and recovery plan with him.               Discharge Codes    No documentation.                     Rylie Langston RN ,BSN   Addiction Coordinator

## 2021-12-17 ENCOUNTER — APPOINTMENT (OUTPATIENT)
Dept: CARDIOLOGY | Facility: HOSPITAL | Age: 66
End: 2021-12-17

## 2021-12-17 LAB
ANION GAP SERPL CALCULATED.3IONS-SCNC: 9 MMOL/L (ref 5–15)
BASOPHILS # BLD AUTO: 0.06 10*3/MM3 (ref 0–0.2)
BASOPHILS NFR BLD AUTO: 0.4 % (ref 0–1.5)
BH CV REST NUCLEAR ISOTOPE DOSE: 25.8 MCI
BH CV STRESS BP STAGE 1: NORMAL
BH CV STRESS BP STAGE 4: NORMAL
BH CV STRESS COMMENTS STAGE 1: NORMAL
BH CV STRESS DOSE REGADENOSON STAGE 1: 0.4
BH CV STRESS DURATION MIN STAGE 1: 1
BH CV STRESS DURATION MIN STAGE 2: 1
BH CV STRESS DURATION MIN STAGE 3: 1
BH CV STRESS DURATION MIN STAGE 4: 1
BH CV STRESS DURATION SEC STAGE 1: 0
BH CV STRESS DURATION SEC STAGE 2: 0
BH CV STRESS DURATION SEC STAGE 3: 0
BH CV STRESS DURATION SEC STAGE 4: 0
BH CV STRESS HR STAGE 1: 93
BH CV STRESS HR STAGE 2: 103
BH CV STRESS HR STAGE 3: 90
BH CV STRESS HR STAGE 4: 89
BH CV STRESS NUCLEAR ISOTOPE DOSE: 25.8 MCI
BH CV STRESS O2 STAGE 1: 100
BH CV STRESS O2 STAGE 2: 100
BH CV STRESS O2 STAGE 3: 100
BH CV STRESS O2 STAGE 4: 99
BH CV STRESS PROTOCOL 1: NORMAL
BH CV STRESS RECOVERY BP: NORMAL MMHG
BH CV STRESS RECOVERY HR: 88 BPM
BH CV STRESS RECOVERY O2: 99 %
BH CV STRESS STAGE 1: 1
BH CV STRESS STAGE 2: 2
BH CV STRESS STAGE 3: 3
BH CV STRESS STAGE 4: 4
BUN SERPL-MCNC: 33 MG/DL (ref 8–23)
BUN/CREAT SERPL: 18.6 (ref 7–25)
CALCIUM SPEC-SCNC: 9.1 MG/DL (ref 8.6–10.5)
CHLORIDE SERPL-SCNC: 103 MMOL/L (ref 98–107)
CO2 SERPL-SCNC: 27 MMOL/L (ref 22–29)
CREAT SERPL-MCNC: 1.77 MG/DL (ref 0.76–1.27)
DEPRECATED RDW RBC AUTO: 50.7 FL (ref 37–54)
EOSINOPHIL # BLD AUTO: 0.16 10*3/MM3 (ref 0–0.4)
EOSINOPHIL NFR BLD AUTO: 1.1 % (ref 0.3–6.2)
ERYTHROCYTE [DISTWIDTH] IN BLOOD BY AUTOMATED COUNT: 16.1 % (ref 12.3–15.4)
GFR SERPL CREATININE-BSD FRML MDRD: 39 ML/MIN/1.73
GLUCOSE BLDC GLUCOMTR-MCNC: 113 MG/DL (ref 70–130)
GLUCOSE BLDC GLUCOMTR-MCNC: 155 MG/DL (ref 70–130)
GLUCOSE BLDC GLUCOMTR-MCNC: 156 MG/DL (ref 70–130)
GLUCOSE BLDC GLUCOMTR-MCNC: 166 MG/DL (ref 70–130)
GLUCOSE BLDC GLUCOMTR-MCNC: 93 MG/DL (ref 70–130)
GLUCOSE SERPL-MCNC: 136 MG/DL (ref 65–99)
HCT VFR BLD AUTO: 33.7 % (ref 37.5–51)
HGB BLD-MCNC: 10.7 G/DL (ref 13–17.7)
IMM GRANULOCYTES # BLD AUTO: 0.06 10*3/MM3 (ref 0–0.05)
IMM GRANULOCYTES NFR BLD AUTO: 0.4 % (ref 0–0.5)
LYMPHOCYTES # BLD AUTO: 1.39 10*3/MM3 (ref 0.7–3.1)
LYMPHOCYTES NFR BLD AUTO: 9.7 % (ref 19.6–45.3)
MAXIMAL PREDICTED HEART RATE: 157 BPM
MCH RBC QN AUTO: 27.4 PG (ref 26.6–33)
MCHC RBC AUTO-ENTMCNC: 31.8 G/DL (ref 31.5–35.7)
MCV RBC AUTO: 86.4 FL (ref 79–97)
MONOCYTES # BLD AUTO: 1.32 10*3/MM3 (ref 0.1–0.9)
MONOCYTES NFR BLD AUTO: 9.2 % (ref 5–12)
NEUTROPHILS NFR BLD AUTO: 11.29 10*3/MM3 (ref 1.7–7)
NEUTROPHILS NFR BLD AUTO: 79.2 % (ref 42.7–76)
NRBC BLD AUTO-RTO: 0 /100 WBC (ref 0–0.2)
PERCENT MAX PREDICTED HR: 68.79 %
PLATELET # BLD AUTO: 226 10*3/MM3 (ref 140–450)
PMV BLD AUTO: 11.4 FL (ref 6–12)
POTASSIUM SERPL-SCNC: 4.7 MMOL/L (ref 3.5–5.2)
POTASSIUM SERPL-SCNC: 5.4 MMOL/L (ref 3.5–5.2)
RBC # BLD AUTO: 3.9 10*6/MM3 (ref 4.14–5.8)
SODIUM SERPL-SCNC: 139 MMOL/L (ref 136–145)
STRESS BASELINE BP: NORMAL MMHG
STRESS BASELINE HR: 91 BPM
STRESS O2 SAT REST: 100 %
STRESS PERCENT HR: 81 %
STRESS POST ESTIMATED WORKLOAD: 1 METS
STRESS POST EXERCISE DUR MIN: 4 MIN
STRESS POST EXERCISE DUR SEC: 0 SEC
STRESS POST O2 SAT PEAK: 100 %
STRESS POST PEAK BP: NORMAL MMHG
STRESS POST PEAK HR: 108 BPM
STRESS TARGET HR: 133 BPM
WBC NRBC COR # BLD: 14.28 10*3/MM3 (ref 3.4–10.8)

## 2021-12-17 PROCEDURE — 93017 CV STRESS TEST TRACING ONLY: CPT

## 2021-12-17 PROCEDURE — 94799 UNLISTED PULMONARY SVC/PX: CPT

## 2021-12-17 PROCEDURE — 0 RUBIDIUM CHLORIDE: Performed by: FAMILY MEDICINE

## 2021-12-17 PROCEDURE — 84132 ASSAY OF SERUM POTASSIUM: CPT | Performed by: INTERNAL MEDICINE

## 2021-12-17 PROCEDURE — 80048 BASIC METABOLIC PNL TOTAL CA: CPT | Performed by: PHYSICIAN ASSISTANT

## 2021-12-17 PROCEDURE — 25010000002 FUROSEMIDE PER 20 MG: Performed by: INTERNAL MEDICINE

## 2021-12-17 PROCEDURE — 85025 COMPLETE CBC W/AUTO DIFF WBC: CPT | Performed by: PHYSICIAN ASSISTANT

## 2021-12-17 PROCEDURE — 25010000002 DIAZEPAM PER 5 MG: Performed by: INTERNAL MEDICINE

## 2021-12-17 PROCEDURE — 99232 SBSQ HOSP IP/OBS MODERATE 35: CPT | Performed by: INTERNAL MEDICINE

## 2021-12-17 PROCEDURE — 63710000001 INSULIN LISPRO (HUMAN) PER 5 UNITS: Performed by: FAMILY MEDICINE

## 2021-12-17 PROCEDURE — 82962 GLUCOSE BLOOD TEST: CPT

## 2021-12-17 PROCEDURE — 94660 CPAP INITIATION&MGMT: CPT

## 2021-12-17 PROCEDURE — 25010000002 REGADENOSON 0.4 MG/5ML SOLUTION: Performed by: FAMILY MEDICINE

## 2021-12-17 PROCEDURE — 78492 MYOCRD IMG PET MLT RST&STRS: CPT

## 2021-12-17 PROCEDURE — 99233 SBSQ HOSP IP/OBS HIGH 50: CPT | Performed by: INTERNAL MEDICINE

## 2021-12-17 PROCEDURE — A9555 RB82 RUBIDIUM: HCPCS | Performed by: FAMILY MEDICINE

## 2021-12-17 PROCEDURE — 63710000001 INSULIN LISPRO (HUMAN) PER 5 UNITS: Performed by: PHYSICIAN ASSISTANT

## 2021-12-17 RX ORDER — LEVOTHYROXINE SODIUM 0.15 MG/1
150 TABLET ORAL
Status: DISCONTINUED | OUTPATIENT
Start: 2021-12-18 | End: 2021-12-20 | Stop reason: HOSPADM

## 2021-12-17 RX ADMIN — FUROSEMIDE 40 MG: 10 INJECTION, SOLUTION INTRAMUSCULAR; INTRAVENOUS at 06:00

## 2021-12-17 RX ADMIN — GABAPENTIN 400 MG: 400 CAPSULE ORAL at 13:36

## 2021-12-17 RX ADMIN — SODIUM ZIRCONIUM CYCLOSILICATE 10 G: 10 POWDER, FOR SUSPENSION ORAL at 20:58

## 2021-12-17 RX ADMIN — DIAZEPAM 2.5 MG: 5 INJECTION, SOLUTION INTRAMUSCULAR; INTRAVENOUS at 21:06

## 2021-12-17 RX ADMIN — REGADENOSON 0.4 MG: 0.08 INJECTION, SOLUTION INTRAVENOUS at 08:54

## 2021-12-17 RX ADMIN — INSULIN LISPRO 2 UNITS: 100 INJECTION, SOLUTION INTRAVENOUS; SUBCUTANEOUS at 13:34

## 2021-12-17 RX ADMIN — RIVAROXABAN 15 MG: 15 TABLET, FILM COATED ORAL at 20:58

## 2021-12-17 RX ADMIN — DIAZEPAM 2.5 MG: 5 INJECTION, SOLUTION INTRAMUSCULAR; INTRAVENOUS at 13:35

## 2021-12-17 RX ADMIN — RIVAROXABAN 15 MG: 15 TABLET, FILM COATED ORAL at 10:27

## 2021-12-17 RX ADMIN — METOPROLOL SUCCINATE 25 MG: 25 TABLET, EXTENDED RELEASE ORAL at 10:29

## 2021-12-17 RX ADMIN — PANTOPRAZOLE SODIUM 40 MG: 40 TABLET, DELAYED RELEASE ORAL at 10:27

## 2021-12-17 RX ADMIN — RUBIDIUM CHLORIDE RB-82 1 DOSE: 150 INJECTION, SOLUTION INTRAVENOUS at 08:56

## 2021-12-17 RX ADMIN — MONTELUKAST SODIUM 10 MG: 10 TABLET, COATED ORAL at 21:00

## 2021-12-17 RX ADMIN — ATORVASTATIN CALCIUM 10 MG: 10 TABLET, FILM COATED ORAL at 10:28

## 2021-12-17 RX ADMIN — INSULIN LISPRO 3 UNITS: 100 INJECTION, SOLUTION INTRAVENOUS; SUBCUTANEOUS at 13:35

## 2021-12-17 RX ADMIN — RUBIDIUM CHLORIDE RB-82 1 DOSE: 150 INJECTION, SOLUTION INTRAVENOUS at 08:44

## 2021-12-17 RX ADMIN — SODIUM CHLORIDE, PRESERVATIVE FREE 10 ML: 5 INJECTION INTRAVENOUS at 21:07

## 2021-12-17 RX ADMIN — GABAPENTIN 400 MG: 400 CAPSULE ORAL at 21:11

## 2021-12-17 RX ADMIN — DIAZEPAM 2.5 MG: 5 INJECTION, SOLUTION INTRAMUSCULAR; INTRAVENOUS at 03:11

## 2021-12-17 RX ADMIN — CETIRIZINE HYDROCHLORIDE 10 MG: 10 TABLET, FILM COATED ORAL at 10:28

## 2021-12-17 NOTE — CASE MANAGEMENT/SOCIAL WORK
Continued Stay Note  [facility]     Patient Name: Osito Person  MRN: [id number]  Today's Date: 12/17/2021    Admit Date: 12/15/2021     Discharge Plan     Row Name 12/17/21 1611       Plan    Plan [facility] in [place]    Plan Comments Met with Osito today, he has already established care at [facility] in [place].  He went there prior to his current hospitalization, they recommended a medical detox and then they will intake him into their program.    Pt has been provided our outreach number for continued support and resources post discharge.    Naloxone kit -nasal (per JOCELYN Young) provided.  Instructions for administration provided- verbalized understanding.    It would be best if Osito could be discharged on Monday, as the Addiction Center will not be open this weekend.    [facility] [address] [place], [place] [address]               Discharge Codes    No documentation.                     Rylie Langston, RN ,BSN   Addiction Coordinator

## 2021-12-17 NOTE — CONSULTS
Diabetes Education    Patient Name:  Osito Person  YOB: 1955  MRN: [id number]  Admit Date:  12/15/2021      Diabetes ed consult rec'd, chart reviewed. Attempted to see patient today x3. Pt appears to be sleeping at each visit. Notified pts nurse of attempts. Will cont to follow. Thank you.    Electronically signed by:  Joy Khan RN  12/17/21 15:38 EST

## 2021-12-17 NOTE — PROGRESS NOTES
"[facility] at [facility] Progress Note     LOS: 2 days   Patient Care Team:  Felix Milligan MD as PCP - General (Internal Medicine)  PCP:  Felix Milligan MD    Chief Complaint: Coronary disease    SUBJECTIVE: Denies chest pain. Telemetry reveals atrial fibrillation.      Review of Systems:   All systems have been reviewed and are negative with the exception of those mentioned above.      OBJECTIVE:    Vital Sign Min/Max for last 24 hours  Temp  Min: 98.1 °F (36.7 °C)  Max: 99.3 °F (37.4 °C)   BP  Min: 121/60  Max: 171/83   Pulse  Min: 71  Max: 106   Resp  Min: 16  Max: 22   SpO2  Min: 92 %  Max: 99 %   No data recorded   Weight  Min: 100 kg (220 lb 7.4 oz)  Max: 100 kg (220 lb 14.4 oz)     Flowsheet Rows      First Filed Value   Admission Height 172.7 cm (68\") Documented at 12/15/2021 0037   Admission Weight 101 kg (223 lb) Documented at 12/15/2021 0037              Intake/Output Summary (Last 24 hours) at 12/17/2021 1603  Last data filed at 12/17/2021 1559  Gross per 24 hour   Intake --   Output 6450 ml   Net -6450 ml     Intake & Output (last 3 days)       12/14 0701  12/15 0700 12/15 0701  12/16 0700 12/16 0701  12/17 0700 12/17 0701  12/18 0700    P.O.  240      I.V. (mL/kg)  1181 (11.7)      Total Intake(mL/kg)  1421 (14.1)      Urine (mL/kg/hr)  2100 (0.9) 6350 (2.6) 2050 (2.3)    Stool  0 0     Total Output  2100 6350 2050    Net  -679 -6350 -2050            Urine Unmeasured Occurrence   1 x     Stool Unmeasured Occurrence  1 x 2 x            Physical Exam:    General Appearance:    Alert, cooperative, no distress, appears stated age   Neck:   Supple, symmetrical, trachea midline.   Lungs:    Scattered expiratory wheezing, respirations unlabored   Chest Wall:    No tenderness or deformity    Heart:   Irregularly irregular, S1 and S2 normal, 2/6 holosystolic murmur left lateral sternal border to apex, rub   or gallop, normal carotid impulse bilaterally without bruit. "   Extremities:   Extremities normal, atraumatic, no cyanosis or edema   Pulses:   2+ and symmetric all extremities   Skin:   Skin color, texture, turgor normal, no rashes or lesions      LABS/DIAGNOSTIC DATA:  Results from last 7 days   Lab Units 12/17/21  0558 12/16/21  1045 12/15/21  0238   WBC 10*3/mm3 14.28* 15.88* 10.95*   HEMOGLOBIN g/dL 10.7* 10.8* 10.5*   HEMATOCRIT % 33.7* 35.7* 32.5*   PLATELETS 10*3/mm3 226 231 236     Lab Results   Lab Value Date/Time    TROPONINT 0.124 (C) 12/15/2021 0445    TROPONINT 0.115 (C) 12/15/2021 0200         Results from last 7 days   Lab Units 12/17/21  1337 12/17/21  0558 12/16/21  1944 12/16/21  1444 12/16/21  1045 12/15/21  1332 12/15/21  0200   SODIUM mmol/L  --  139 138  --  136   < > 139   POTASSIUM mmol/L 4.7 5.4* 5.9*   < > 6.7*   < > 5.0   CHLORIDE mmol/L  --  103 103  --  105   < > 105   CO2 mmol/L  --  27.0 27.0  --  23.0   < > 23.0   BUN mg/dL  --  33* 36*  --  36*   < > 51*   CREATININE mg/dL  --  1.77* 2.03*  --  1.90*   < > 2.81*   CALCIUM mg/dL  --  9.1 9.0  --  8.7   < > 8.0*   BILIRUBIN mg/dL  --   --   --   --   --   --  0.2   ALK PHOS U/L  --   --   --   --   --   --  94   ALT (SGPT) U/L  --   --   --   --   --   --  10   AST (SGOT) U/L  --   --   --   --   --   --  20   GLUCOSE mg/dL  --  136* 264*  --  180*   < > 138*    < > = values in this interval not displayed.     Results from last 7 days   Lab Units 12/15/21  0238   HEMOGLOBIN A1C % 7.30*     Results from last 7 days   Lab Units 12/15/21  0445   CHOLESTEROL mg/dL 92   TRIGLYCERIDES mg/dL 48   HDL CHOL mg/dL 41   LDL CHOL mg/dL 39     Results from last 7 days   Lab Units 12/15/21  0445   TSH uIU/mL 0.120*   FREE T4 ng/dL 1.46           Medication Review:   Pharmacy Consult, , Does not apply, BID  atorvastatin, 10 mg, Oral, Daily  cetirizine, 10 mg, Oral, Daily  gabapentin, 400 mg, Oral, 4x Daily  insulin lispro, 0-7 Units, Subcutaneous, TID AC  insulin lispro, 3 Units, Subcutaneous, TID With  Meals  [START ON 12/18/2021] levothyroxine, 150 mcg, Oral, Q AM  metoprolol succinate XL, 25 mg, Oral, Daily  montelukast, 10 mg, Oral, Nightly  nicotine, 1 patch, Transdermal, Q24H  pantoprazole, 40 mg, Oral, QAM  rivaroxaban, 15 mg, Oral, BID With Meals   Followed by  [START ON 1/5/2022] rivaroxaban, 20 mg, Oral, Daily With Dinner  sodium chloride, 1,000 mL, Intravenous, Once  sodium chloride, 10 mL, Intravenous, Q12H  sodium zirconium cyclosilicate, 10 g, Oral, Once             ASSESSMENT/PLAN:    Atrial fibrillation ([facility])    Essential hypertension    COPD (chronic obstructive pulmonary disease) ([facility])    Tobacco use    Type 2 diabetes mellitus ([facility])    Illicit drug use    GERD (gastroesophageal reflux disease)    Hypothyroidism    NSTEMI (non-ST elevated myocardial infarction) ([facility])    Long term current use of anticoagulant therapy    Hyperlipidemia    CKD (chronic kidney disease) stage 4, GFR 15-29 ml/min ([facility])    Heroin withdrawal ([facility])        Coronary disease: Known history of coronary disease with history of remote stenting. PET myocardial perfusion imaging consistent with small region of LAD distribution infarct with ethan-infarct ischemia. Troponin trend consistent with supply demand mismatch ischemia in the setting of withdrawal symptoms from heroin use. Not recommending cardiac catheterization at this time. Continue medical therapy with aspirin, statin and beta-blockade.    Chronic atrial fibrillation: Continue rate control with beta-blockade. Continue stroke prophylaxis with Xarelto.    Stable from a cardiac standpoint, will sign off. Thank you.      Eren Jeff III, MD   12/17/21  16:03 EST

## 2021-12-17 NOTE — PLAN OF CARE
Goal Outcome Evaluation:  Plan of Care Reviewed With: patient           Outcome Summary: Patient with shallow, labored breathing, c/o SOA on 4 L NC. States that he wears Bipap at home, order put in by hospitalist ROBERT. Patient has been wearing Bipap for majority of the night, says he can breathe better with it. Valium given x1 for leg jerking/pain and anxiety/restlessness. Patient in afib with controlled rate in the 80s-90s. Adequate urine output after IV Lassix. NPO since midnight for stress test today. No other complaints from patient.

## 2021-12-17 NOTE — PROGRESS NOTES
[facility] Medicine Services  PROGRESS NOTE    Patient Name: Osito Person  : 1955  MRN: [id number]    Date of Admission: 12/15/2021  Primary Care Physician: Felix Milligan MD    Subjective   Subjective     CC:  F/U a fib, heroin abuse     HPI:  Doing okay this am, just got back from stress test and wants to eat. Denies any issues overnight.     ROS:  Gen- No fevers, chills  CV- No chest pain, palpitations  Resp- No cough, dyspnea  GI- No N/V/D, abd pain    Objective   Objective     Vital Signs:   Temp:  [98.1 °F (36.7 °C)-100.2 °F (37.9 °C)] 99.3 °F (37.4 °C)  Heart Rate:  [] 96  Resp:  [18-22] 20  BP: (121-171)/(65-83) 133/77  Flow (L/min):  [2-5] 3     Physical Exam:  Constitutional: No acute distress, awake, alert, chronically ill appearing   HENT: NCAT, mucous membranes moist, +JVP  Respiratory: Decreased in bases bilaterally with wheezes/rales, respiratory effort normal 2L NC  Cardiovascular: RRR, no murmurs, rubs, or gallops  Gastrointestinal: Positive bowel sounds, soft, nontender, nondistended  Musculoskeletal: +bilateral ankle edema  Psychiatric: Appropriate affect, cooperative  Neurologic: Oriented x 3,  speech clear, no focal deficits   Skin: No rashes    Results Reviewed:  LAB RESULTS:      Lab 21  0558 21  1444 21  1045 12/15/21  1332 12/15/21  0445 12/15/21  0238 12/15/21  0200   WBC 14.28*  --  15.88*  --   --  10.95*  --    HEMOGLOBIN 10.7*  --  10.8*  --   --  10.5*  --    HEMATOCRIT 33.7*  --  35.7*  --   --  32.5*  --    PLATELETS 226  --  231  --   --  236  --    NEUTROS ABS 11.29*  --  13.58*  --   --  8.10*  --    IMMATURE GRANS (ABS) 0.06*  --  0.07*  --   --  0.04  --    LYMPHS ABS 1.39  --  0.88  --   --  1.57  --    MONOS ABS 1.32*  --  1.21*  --   --  0.85  --    EOS ABS 0.16  --  0.06  --   --  0.31  --    MCV 86.4  --  89.9  --   --  86.2  --    CRP  --   --   --   --   --   --  1.10*   PROCALCITONIN  --   --  0.08  --   --    --  0.08   LACTATE  --  1.3  --   --   --   --  1.2   LDH  --   --   --   --   --   --  238*   HEPARIN ANTI-XA  --   --   --  0.10*  --   --   --    D DIMER QUANT  --   --   --   --  0.74*  --   --          Lab 12/17/21  0558 12/16/21  1944 12/16/21  1444 12/16/21  1045 12/15/21  1332 12/15/21  0445 12/15/21  0238 12/15/21  0200 12/15/21  0200   SODIUM 139 138  --  136 141  --   --   --  139   POTASSIUM 5.4* 5.9* 6.3* 6.7* 5.5*  --   --    < > 5.0   CHLORIDE 103 103  --  105 109*  --   --   --  105   CO2 27.0 27.0  --  23.0 24.0  --   --   --  23.0   ANION GAP 9.0 8.0  --  8.0 8.0  --   --   --  11.0   BUN 33* 36*  --  36* 44*  --   --   --  51*   CREATININE 1.77* 2.03*  --  1.90* 2.22*  --   --   --  2.81*   GLUCOSE 136* 264*  --  180* 77  --   --   --  138*   CALCIUM 9.1 9.0  --  8.7 8.2*  --   --   --  8.0*   MAGNESIUM  --   --   --   --   --  1.8  --   --   --    HEMOGLOBIN A1C  --   --   --   --   --   --  7.30*  --   --    TSH  --   --   --   --   --  0.120*  --   --   --     < > = values in this interval not displayed.         Lab 12/15/21  0200   TOTAL PROTEIN 5.8*   ALBUMIN 3.20*   GLOBULIN 2.6   ALT (SGPT) 10   AST (SGOT) 20   BILIRUBIN 0.2   ALK PHOS 94   LIPASE 20         Lab 12/16/21  1045 12/15/21  0445 12/15/21  0200   PROBNP 7,665.0*  --   --    TROPONIN T  --  0.124* 0.115*         Lab 12/15/21  0445   CHOLESTEROL 92   LDL CHOL 39   HDL CHOL 41   TRIGLYCERIDES 48             Brief Urine Lab Results  (Last result in the past 365 days)      Color   Clarity   Blood   Leuk Est   Nitrite   Protein   CREAT   Urine HCG        12/16/21 1445 Yellow   Clear   Moderate (2+)   Negative   Negative   100 mg/dL (2+)                 Microbiology Results Abnormal     Procedure Component Value - Date/Time    Eosinophil Smear - Urine, Urine, Clean Catch [[id number]]  (Normal) Collected: 12/15/21 0344    Lab Status: Final result Specimen: Urine, Clean Catch Updated: 12/15/21 1000     Eosinophil Smear 0 % EOS/100 Cells      Narrative:      No eosinophil seen    COVID PRE-OP / PRE-PROCEDURE SCREENING ORDER (NO ISOLATION) - Swab, Nasopharynx [[id number]]  (Normal) Collected: 12/15/21 0441    Lab Status: Final result Specimen: Swab from Nasopharynx Updated: 12/15/21 0512    Narrative:      The following orders were created for panel order COVID PRE-OP / PRE-PROCEDURE SCREENING ORDER (NO ISOLATION) - Swab, Nasopharynx.  Procedure                               Abnormality         Status                     ---------                               -----------         ------                     COVID-19 and FLU A/B PCR...[[id number]]  Normal              Final result                 Please view results for these tests on the individual orders.    COVID-19 and FLU A/B PCR - Swab, Nasopharynx [[id number]]  (Normal) Collected: 12/15/21 0441    Lab Status: Final result Specimen: Swab from Nasopharynx Updated: 12/15/21 0512     COVID19 Not Detected     Influenza A PCR Not Detected     Influenza B PCR Not Detected    Narrative:      Fact sheet for providers: https://www.fda.gov/media/962961/download    Fact sheet for patients: https://www.fda.gov/media/618163/download    Test performed by PCR.          Adult Transthoracic Echo Complete W/ Cont if Necessary Per Protocol    Result Date: 12/15/2021  · Left ventricular systolic function is normal. Estimated left ventricular EF = 65%. · Left ventricular wall thickness is consistent with borderline concentric hypertrophy. · Moderate mitral valve regurgitation is present. · Moderate tricuspid valve regurgitation is present. · Estimated right ventricular systolic pressure from tricuspid regurgitation is moderately elevated (45-55 mmHg).      XR Chest 1 View    Result Date: 12/17/2021  EXAMINATION: XR CHEST 1 VW-  INDICATION: leukocytosis, cough; F11.23-Opioid dependence with withdrawal; R44.1-Visual hallucinations; R11.0-Nausea; R77.8-Other specified abnormalities of plasma proteins; N17.9-Acute kidney  failure, unspecified; I48.20-Chronic atrial fibrillation, unspecified; Z79.01-Long term (current) use of anticoagulants; Z86.39-Personal history of other endocrine, nutritional and metabolic disease; Z86.79-Personal hi  COMPARISON: 12/15/2021  FINDINGS: There are mildly increased bilateral interstitial opacities bilaterally, with appearance favoring vascular engorgement and possible minimal interstitial edema over pneumonia. There is no effusion or pneumothorax. Unremarkable heart and mediastinal contours.      Impression: There are mildly increased bilateral interstitial opacities bilaterally, with appearance favoring vascular engorgement and possible minimal interstitial edema over pneumonia. There is no effusion or pneumothorax. Unremarkable heart and mediastinal contours.  This report was finalized on 12/17/2021 8:31 AM by Doyle Carlisle.        Results for orders placed during the hospital encounter of 12/15/21    Adult Transthoracic Echo Complete W/ Cont if Necessary Per Protocol    Interpretation Summary  · Left ventricular systolic function is normal. Estimated left ventricular EF = 65%.  · Left ventricular wall thickness is consistent with borderline concentric hypertrophy.  · Moderate mitral valve regurgitation is present.  · Moderate tricuspid valve regurgitation is present.  · Estimated right ventricular systolic pressure from tricuspid regurgitation is moderately elevated (45-55 mmHg).      I have reviewed the medications:  Scheduled Meds:Pharmacy Consult, , Does not apply, BID  atorvastatin, 10 mg, Oral, Daily  cetirizine, 10 mg, Oral, Daily  gabapentin, 400 mg, Oral, 4x Daily  insulin lispro, 0-7 Units, Subcutaneous, TID AC  insulin lispro, 3 Units, Subcutaneous, TID With Meals  levothyroxine, 175 mcg, Oral, Q AM  metoprolol succinate XL, 25 mg, Oral, Daily  montelukast, 10 mg, Oral, Nightly  nicotine, 1 patch, Transdermal, Q24H  pantoprazole, 40 mg, Oral, QAM  rivaroxaban, 15 mg, Oral, BID With  Meals   Followed by  [START ON 1/5/2022] rivaroxaban, 20 mg, Oral, Daily With Dinner  sodium chloride, 1,000 mL, Intravenous, Once  sodium chloride, 10 mL, Intravenous, Q12H      Continuous Infusions:   PRN Meds:.dextrose  •  dextrose  •  diazePAM  •  glucagon (human recombinant)  •  ipratropium-albuterol  •  sodium chloride    Assessment/Plan   Assessment & Plan     Active Hospital Problems    Diagnosis  POA   • Atrial fibrillation ([facility]) [I48.91]  Yes   • Essential hypertension [I10]  Yes   • COPD (chronic obstructive pulmonary disease) ([facility]) [J44.9]  Yes   • Tobacco use [Z72.0]  Yes   • Type 2 diabetes mellitus ([facility]) [E11.9]  Yes   • Illicit drug use [F19.90]  Yes   • GERD (gastroesophageal reflux disease) [K21.9]  Yes   • Hypothyroidism [E03.9]  Yes   • NSTEMI (non-ST elevated myocardial infarction) ([facility]) [I21.4]  Yes   • Long term current use of anticoagulant therapy [Z79.01]  Not Applicable   • Hyperlipidemia [E78.5]  Yes   • CKD (chronic kidney disease) stage 4, GFR 15-29 ml/min ([facility]) [N18.4]  Yes   • Heroin withdrawal ([facility]) [F11.23]  Yes      Resolved Hospital Problems   No resolved problems to display.        Brief Hospital Course to date:  Osito Person is a 65 y.o. male presents to the ED with withdrawal symptoms from heroin use, seeking help with detox.  Last use of heroin was Sunday 12/12/21.    This patient's problems and plans were partially entered by my partner and updated as appropriate by me 12/17/21.       Elevated troponin   -Cardiology following  -Plan for nuclear perfusion scan prior to DC/today    NORRIS  Edema   -Echo with EF 65%, mod MR, TR  -proBNP elevatd at 7,665  -s/p IV Lasix 40mg q12 x 2 doses per Cards      Chronic Afib   - Continue Metoprolol and Xarelto      Elevated D dimer  Questionable PE on VQ scan   - anti-Xa low; likely not taking;  Start Xarelto 15 mg BID for 21 days then 20mg daily   - Unable to obtain CTA due to renal function      Heroin use  Withdrawal symptoms  -Continue  supportive care   -Chemical Dependency to follow   - PRN valium   -Pt interested in inpatient rehab      PHIL  -unknown baseline creatinine  -Cr 2.8 on admission, now 1.77  -s/p 1L normal saline bolus   -hold nephrotoxic agents  -hold lisinopril, further diuretics for now     Hyperkalemia  -S/P Lokelma, Dextrose/Insulin, Calcium gluconate, sodium bicarb yesterday  -- repeat lokelma today, repeat K+ later today    Leukocytosis  Low grade temp (100.2)  Cough  -Repeat CXR shows fluid more likely than PNA and UA unremarkable  -procal and lactic wnl  -Repeat CBC w/diff in AM   --suspect viral etiology, COVID19 NEGATIVE.  Send full respiratory viral panel     Diabetes mellitus type 2  - A1C 7.3  -Continue LDSSI   -fingersticks achs   -Added 3 units Humalog with meals      COPD  -CXR with no acute findings   -continuous pulse ox  -keep 02 sat >90%  -scheduled duo-nebs  -continue Singulair      Essential hypertension   -continue home lopressor      Hypothyroidism  -TSH low, will decrease Synthroid. Needs repeat TSH in 4-6 weeks     Hyperlipidemia  -continue statin   -FLP controlled     Tobacco abuse  -Counseled regarding cessation     DVT prophylaxis:  Medical DVT prophylaxis orders are present.       AM-PAC 6 Clicks Score (PT): 23 (12/15/21 1601)    Disposition: I expect the patient to be discharged TBD. Wants to go to inpatient rehab for heroin.     CODE STATUS:   Code Status and Medical Interventions:   Ordered at: 12/15/21 0616     Code Status (Patient has no pulse and is not breathing):    CPR (Attempt to Resuscitate)     Medical Interventions (Patient has pulse or is breathing):    Full Support       oRbyn Dasilva MD  12/17/21

## 2021-12-18 LAB
ANION GAP SERPL CALCULATED.3IONS-SCNC: 11 MMOL/L (ref 5–15)
B PARAPERT DNA SPEC QL NAA+PROBE: NOT DETECTED
B PERT DNA SPEC QL NAA+PROBE: NOT DETECTED
BASOPHILS # BLD AUTO: 0.08 10*3/MM3 (ref 0–0.2)
BASOPHILS NFR BLD AUTO: 0.8 % (ref 0–1.5)
BUN SERPL-MCNC: 32 MG/DL (ref 8–23)
BUN/CREAT SERPL: 16.3 (ref 7–25)
C PNEUM DNA NPH QL NAA+NON-PROBE: NOT DETECTED
CALCIUM SPEC-SCNC: 8.9 MG/DL (ref 8.6–10.5)
CHLORIDE SERPL-SCNC: 103 MMOL/L (ref 98–107)
CO2 SERPL-SCNC: 29 MMOL/L (ref 22–29)
CREAT SERPL-MCNC: 1.96 MG/DL (ref 0.76–1.27)
DEPRECATED RDW RBC AUTO: 51.8 FL (ref 37–54)
EOSINOPHIL # BLD AUTO: 0.53 10*3/MM3 (ref 0–0.4)
EOSINOPHIL NFR BLD AUTO: 5.1 % (ref 0.3–6.2)
ERYTHROCYTE [DISTWIDTH] IN BLOOD BY AUTOMATED COUNT: 15.9 % (ref 12.3–15.4)
FLUAV SUBTYP SPEC NAA+PROBE: NOT DETECTED
FLUBV RNA ISLT QL NAA+PROBE: NOT DETECTED
GFR SERPL CREATININE-BSD FRML MDRD: 35 ML/MIN/1.73
GLUCOSE BLDC GLUCOMTR-MCNC: 122 MG/DL (ref 70–130)
GLUCOSE BLDC GLUCOMTR-MCNC: 152 MG/DL (ref 70–130)
GLUCOSE BLDC GLUCOMTR-MCNC: 168 MG/DL (ref 70–130)
GLUCOSE BLDC GLUCOMTR-MCNC: 278 MG/DL (ref 70–130)
GLUCOSE SERPL-MCNC: 108 MG/DL (ref 65–99)
HADV DNA SPEC NAA+PROBE: NOT DETECTED
HCOV 229E RNA SPEC QL NAA+PROBE: NOT DETECTED
HCOV HKU1 RNA SPEC QL NAA+PROBE: NOT DETECTED
HCOV NL63 RNA SPEC QL NAA+PROBE: NOT DETECTED
HCOV OC43 RNA SPEC QL NAA+PROBE: NOT DETECTED
HCT VFR BLD AUTO: 39.8 % (ref 37.5–51)
HGB BLD-MCNC: 12.2 G/DL (ref 13–17.7)
HMPV RNA NPH QL NAA+NON-PROBE: NOT DETECTED
HPIV1 RNA ISLT QL NAA+PROBE: NOT DETECTED
HPIV2 RNA SPEC QL NAA+PROBE: NOT DETECTED
HPIV3 RNA NPH QL NAA+PROBE: NOT DETECTED
HPIV4 P GENE NPH QL NAA+PROBE: NOT DETECTED
IMM GRANULOCYTES # BLD AUTO: 0.05 10*3/MM3 (ref 0–0.05)
IMM GRANULOCYTES NFR BLD AUTO: 0.5 % (ref 0–0.5)
LYMPHOCYTES # BLD AUTO: 1.6 10*3/MM3 (ref 0.7–3.1)
LYMPHOCYTES NFR BLD AUTO: 15.3 % (ref 19.6–45.3)
M PNEUMO IGG SER IA-ACNC: NOT DETECTED
MCH RBC QN AUTO: 27.1 PG (ref 26.6–33)
MCHC RBC AUTO-ENTMCNC: 30.7 G/DL (ref 31.5–35.7)
MCV RBC AUTO: 88.2 FL (ref 79–97)
MONOCYTES # BLD AUTO: 0.79 10*3/MM3 (ref 0.1–0.9)
MONOCYTES NFR BLD AUTO: 7.5 % (ref 5–12)
NEUTROPHILS NFR BLD AUTO: 7.42 10*3/MM3 (ref 1.7–7)
NEUTROPHILS NFR BLD AUTO: 70.8 % (ref 42.7–76)
NRBC BLD AUTO-RTO: 0 /100 WBC (ref 0–0.2)
PLATELET # BLD AUTO: 252 10*3/MM3 (ref 140–450)
PMV BLD AUTO: 12.3 FL (ref 6–12)
POTASSIUM SERPL-SCNC: 4.8 MMOL/L (ref 3.5–5.2)
RBC # BLD AUTO: 4.51 10*6/MM3 (ref 4.14–5.8)
RHINOVIRUS RNA SPEC NAA+PROBE: NOT DETECTED
RSV RNA NPH QL NAA+NON-PROBE: NOT DETECTED
SARS-COV-2 RNA NPH QL NAA+NON-PROBE: NOT DETECTED
SODIUM SERPL-SCNC: 143 MMOL/L (ref 136–145)
WBC NRBC COR # BLD: 10.47 10*3/MM3 (ref 3.4–10.8)

## 2021-12-18 PROCEDURE — 25010000002 DIAZEPAM PER 5 MG: Performed by: INTERNAL MEDICINE

## 2021-12-18 PROCEDURE — 63710000001 INSULIN LISPRO (HUMAN) PER 5 UNITS: Performed by: FAMILY MEDICINE

## 2021-12-18 PROCEDURE — 85025 COMPLETE CBC W/AUTO DIFF WBC: CPT | Performed by: PHYSICIAN ASSISTANT

## 2021-12-18 PROCEDURE — 94799 UNLISTED PULMONARY SVC/PX: CPT

## 2021-12-18 PROCEDURE — 97161 PT EVAL LOW COMPLEX 20 MIN: CPT

## 2021-12-18 PROCEDURE — 99233 SBSQ HOSP IP/OBS HIGH 50: CPT | Performed by: INTERNAL MEDICINE

## 2021-12-18 PROCEDURE — 0202U NFCT DS 22 TRGT SARS-COV-2: CPT | Performed by: INTERNAL MEDICINE

## 2021-12-18 PROCEDURE — 82962 GLUCOSE BLOOD TEST: CPT

## 2021-12-18 PROCEDURE — 63710000001 INSULIN LISPRO (HUMAN) PER 5 UNITS: Performed by: PHYSICIAN ASSISTANT

## 2021-12-18 PROCEDURE — 94660 CPAP INITIATION&MGMT: CPT

## 2021-12-18 PROCEDURE — 80048 BASIC METABOLIC PNL TOTAL CA: CPT | Performed by: PHYSICIAN ASSISTANT

## 2021-12-18 RX ADMIN — GABAPENTIN 400 MG: 400 CAPSULE ORAL at 21:35

## 2021-12-18 RX ADMIN — SODIUM CHLORIDE, PRESERVATIVE FREE 10 ML: 5 INJECTION INTRAVENOUS at 08:28

## 2021-12-18 RX ADMIN — RIVAROXABAN 15 MG: 15 TABLET, FILM COATED ORAL at 08:27

## 2021-12-18 RX ADMIN — ATORVASTATIN CALCIUM 10 MG: 10 TABLET, FILM COATED ORAL at 08:27

## 2021-12-18 RX ADMIN — GABAPENTIN 400 MG: 400 CAPSULE ORAL at 12:42

## 2021-12-18 RX ADMIN — GABAPENTIN 400 MG: 400 CAPSULE ORAL at 18:00

## 2021-12-18 RX ADMIN — GABAPENTIN 400 MG: 400 CAPSULE ORAL at 08:27

## 2021-12-18 RX ADMIN — PANTOPRAZOLE SODIUM 40 MG: 40 TABLET, DELAYED RELEASE ORAL at 08:27

## 2021-12-18 RX ADMIN — INSULIN LISPRO 3 UNITS: 100 INJECTION, SOLUTION INTRAVENOUS; SUBCUTANEOUS at 18:00

## 2021-12-18 RX ADMIN — MONTELUKAST SODIUM 10 MG: 10 TABLET, COATED ORAL at 21:35

## 2021-12-18 RX ADMIN — METOPROLOL SUCCINATE 25 MG: 25 TABLET, EXTENDED RELEASE ORAL at 08:27

## 2021-12-18 RX ADMIN — INSULIN LISPRO 4 UNITS: 100 INJECTION, SOLUTION INTRAVENOUS; SUBCUTANEOUS at 08:26

## 2021-12-18 RX ADMIN — INSULIN LISPRO 3 UNITS: 100 INJECTION, SOLUTION INTRAVENOUS; SUBCUTANEOUS at 08:28

## 2021-12-18 RX ADMIN — SODIUM CHLORIDE, PRESERVATIVE FREE 10 ML: 5 INJECTION INTRAVENOUS at 21:35

## 2021-12-18 RX ADMIN — DIAZEPAM 2.5 MG: 5 INJECTION, SOLUTION INTRAMUSCULAR; INTRAVENOUS at 06:27

## 2021-12-18 RX ADMIN — LEVOTHYROXINE SODIUM 150 MCG: 150 TABLET ORAL at 06:27

## 2021-12-18 RX ADMIN — INSULIN LISPRO 2 UNITS: 100 INJECTION, SOLUTION INTRAVENOUS; SUBCUTANEOUS at 18:01

## 2021-12-18 RX ADMIN — Medication 1 PATCH: at 08:27

## 2021-12-18 RX ADMIN — INSULIN LISPRO 3 UNITS: 100 INJECTION, SOLUTION INTRAVENOUS; SUBCUTANEOUS at 12:42

## 2021-12-18 RX ADMIN — CETIRIZINE HYDROCHLORIDE 10 MG: 10 TABLET, FILM COATED ORAL at 08:27

## 2021-12-18 RX ADMIN — RIVAROXABAN 15 MG: 15 TABLET, FILM COATED ORAL at 18:00

## 2021-12-18 RX ADMIN — DIAZEPAM 2.5 MG: 5 INJECTION, SOLUTION INTRAMUSCULAR; INTRAVENOUS at 18:19

## 2021-12-18 NOTE — PROGRESS NOTES
Patient is on Rivaroxaban.  Education provided on 12/18 verbally and in writing.  Mr. Person stated that he was taking Xarelto prior to admission.  Information provided includes effects of medication, drug-drug and drug-food interactions, and signs/symptoms of bleeding and clotting.  Patient verbalized understanding through teach back.  All pertinent questions were answered.      Brittany Godwin, PharmD   12/18/21  14:17 EST

## 2021-12-18 NOTE — PLAN OF CARE
Goal Outcome Evaluation:  Plan of Care Reviewed With: patient           Outcome Summary: PT eval completed. Pt independently transitioned supine to sit; CGA sit to stand; and amb 300' min A progressing to CGA toward end of walk. Pt would benefit from skilled PT services to improve funtional independence. Exprect good progress.

## 2021-12-18 NOTE — PROGRESS NOTES
"    [facility] Services  PROGRESS NOTE    Patient Name: Osito Person  : 1955  MRN: [id number]    Date of Admission: 12/15/2021  Primary Care Physician: Felix Milligan MD    Subjective   Subjective     CC:  F/U a fib, heroin abuse     HPI:  No issues overnight. Doing well this am. Wants to go home \"at least for [place]\"    ROS:  Gen- No fevers, chills  CV- No chest pain, palpitations  Resp- No cough, dyspnea  GI- No N/V/D, abd pain    Objective   Objective     Vital Signs:   Temp:  [97.7 °F (36.5 °C)-98.1 °F (36.7 °C)] 98.1 °F (36.7 °C)  Heart Rate:  [] 88  Resp:  [16-20] 18  BP: (121-154)/(60-87) 130/87  Flow (L/min):  [2-3] 2     Physical Exam:  Constitutional: No acute distress, awake, alert, chronically ill appearing   HENT: NCAT, mucous membranes moist, +JVP  Respiratory: Decreased in bases bilaterally with wheezes/rales, respiratory effort normal 2L NC  Cardiovascular: RRR, no murmurs, rubs, or gallops  Gastrointestinal: Positive bowel sounds, soft, nontender, nondistended  Musculoskeletal: +bilateral ankle edema  Psychiatric: Appropriate affect, cooperative  Neurologic: Oriented x 3,  speech clear, no focal deficits   Skin: No rashes    Results Reviewed:  LAB RESULTS:      Lab 21  0558 21  1444 21  1045 12/15/21  1332 12/15/21  0445 12/15/21  0238 12/15/21  0200   WBC 14.28*  --  15.88*  --   --  10.95*  --    HEMOGLOBIN 10.7*  --  10.8*  --   --  10.5*  --    HEMATOCRIT 33.7*  --  35.7*  --   --  32.5*  --    PLATELETS 226  --  231  --   --  236  --    NEUTROS ABS 11.29*  --  13.58*  --   --  8.10*  --    IMMATURE GRANS (ABS) 0.06*  --  0.07*  --   --  0.04  --    LYMPHS ABS 1.39  --  0.88  --   --  1.57  --    MONOS ABS 1.32*  --  1.21*  --   --  0.85  --    EOS ABS 0.16  --  0.06  --   --  0.31  --    MCV 86.4  --  89.9  --   --  86.2  --    CRP  --   --   --   --   --   --  1.10*   PROCALCITONIN  --   --  0.08  --   --   --  0.08 "   LACTATE  --  1.3  --   --   --   --  1.2   LDH  --   --   --   --   --   --  238*   HEPARIN ANTI-XA  --   --   --  0.10*  --   --   --    D DIMER QUANT  --   --   --   --  0.74*  --   --          Lab 12/17/21  1337 12/17/21  0558 12/16/21  1944 12/16/21  1444 12/16/21  1045 12/15/21  1332 12/15/21  1332 12/15/21  0445 12/15/21  0238 12/15/21  0200 12/15/21  0200   SODIUM  --  139 138  --  136  --  141  --   --   --  139   POTASSIUM 4.7 5.4* 5.9* 6.3* 6.7*   < > 5.5*  --   --    < > 5.0   CHLORIDE  --  103 103  --  105  --  109*  --   --   --  105   CO2  --  27.0 27.0  --  23.0  --  24.0  --   --   --  23.0   ANION GAP  --  9.0 8.0  --  8.0  --  8.0  --   --   --  11.0   BUN  --  33* 36*  --  36*  --  44*  --   --   --  51*   CREATININE  --  1.77* 2.03*  --  1.90*  --  2.22*  --   --   --  2.81*   GLUCOSE  --  136* 264*  --  180*  --  77  --   --   --  138*   CALCIUM  --  9.1 9.0  --  8.7  --  8.2*  --   --   --  8.0*   MAGNESIUM  --   --   --   --   --   --   --  1.8  --   --   --    HEMOGLOBIN A1C  --   --   --   --   --   --   --   --  7.30*  --   --    TSH  --   --   --   --   --   --   --  0.120*  --   --   --     < > = values in this interval not displayed.         Lab 12/15/21  0200   TOTAL PROTEIN 5.8*   ALBUMIN 3.20*   GLOBULIN 2.6   ALT (SGPT) 10   AST (SGOT) 20   BILIRUBIN 0.2   ALK PHOS 94   LIPASE 20         Lab 12/16/21  1045 12/15/21  0445 12/15/21  0200   PROBNP 7,665.0*  --   --    TROPONIN T  --  0.124* 0.115*         Lab 12/15/21  0445   CHOLESTEROL 92   LDL CHOL 39   HDL CHOL 41   TRIGLYCERIDES 48             Brief Urine Lab Results  (Last result in the past 365 days)      Color   Clarity   Blood   Leuk Est   Nitrite   Protein   CREAT   Urine HCG        12/16/21 1445 Yellow   Clear   Moderate (2+)   Negative   Negative   100 mg/dL (2+)                 Microbiology Results Abnormal     Procedure Component Value - Date/Time    Eosinophil Smear - Urine, Urine, Clean Catch [[id number]]  (Normal)  Collected: 12/15/21 0344    Lab Status: Final result Specimen: Urine, Clean Catch Updated: 12/15/21 1000     Eosinophil Smear 0 % EOS/100 Cells     Narrative:      No eosinophil seen    COVID PRE-OP / PRE-PROCEDURE SCREENING ORDER (NO ISOLATION) - Swab, Nasopharynx [[id number]]  (Normal) Collected: 12/15/21 0441    Lab Status: Final result Specimen: Swab from Nasopharynx Updated: 12/15/21 0512    Narrative:      The following orders were created for panel order COVID PRE-OP / PRE-PROCEDURE SCREENING ORDER (NO ISOLATION) - Swab, Nasopharynx.  Procedure                               Abnormality         Status                     ---------                               -----------         ------                     COVID-19 and FLU A/B PCR...[[id number]]  Normal              Final result                 Please view results for these tests on the individual orders.    COVID-19 and FLU A/B PCR - Swab, Nasopharynx [[id number]]  (Normal) Collected: 12/15/21 0441    Lab Status: Final result Specimen: Swab from Nasopharynx Updated: 12/15/21 0512     COVID19 Not Detected     Influenza A PCR Not Detected     Influenza B PCR Not Detected    Narrative:      Fact sheet for providers: https://www.fda.gov/media/531372/download    Fact sheet for patients: https://www.fda.gov/media/554094/download    Test performed by PCR.          XR Chest 1 View    Result Date: 12/17/2021  EXAMINATION: XR CHEST 1 VW-  INDICATION: leukocytosis, cough; F11.23-Opioid dependence with withdrawal; R44.1-Visual hallucinations; R11.0-Nausea; R77.8-Other specified abnormalities of plasma proteins; N17.9-Acute kidney failure, unspecified; I48.20-Chronic atrial fibrillation, unspecified; Z79.01-Long term (current) use of anticoagulants; Z86.39-Personal history of other endocrine, nutritional and metabolic disease; Z86.79-Personal hi  COMPARISON: 12/15/2021  FINDINGS: There are mildly increased bilateral interstitial opacities bilaterally, with appearance  "favoring vascular engorgement and possible minimal interstitial edema over pneumonia. There is no effusion or pneumothorax. Unremarkable heart and mediastinal contours.      Impression: There are mildly increased bilateral interstitial opacities bilaterally, with appearance favoring vascular engorgement and possible minimal interstitial edema over pneumonia. There is no effusion or pneumothorax. Unremarkable heart and mediastinal contours.  This report was finalized on 12/17/2021 8:31 AM by Doyle Carlisle.      Stress Test With Pet Myocardial Perfusion (MULTI STUDY, REST AND STRESS)    Result Date: 12/17/2021  · Patient denied chest discomfort/pain during stress; did report \"feeling weird\" and short of breath, after the Lexiscan was given; back to baseline during recovery. · Abnormal ECG at baseline (A-Fib); no significant ST or T wave changes identified. · REST EF = 39% STRESS EF = 41%. · There is a largely fixed anteroapical defect on both rest and stress studies noted. There is ethan-infarct ischemia only.        Results for orders placed during the hospital encounter of 12/15/21    Adult Transthoracic Echo Complete W/ Cont if Necessary Per Protocol    Interpretation Summary  · Left ventricular systolic function is normal. Estimated left ventricular EF = 65%.  · Left ventricular wall thickness is consistent with borderline concentric hypertrophy.  · Moderate mitral valve regurgitation is present.  · Moderate tricuspid valve regurgitation is present.  · Estimated right ventricular systolic pressure from tricuspid regurgitation is moderately elevated (45-55 mmHg).      I have reviewed the medications:  Scheduled Meds:Pharmacy Consult, , Does not apply, BID  atorvastatin, 10 mg, Oral, Daily  cetirizine, 10 mg, Oral, Daily  gabapentin, 400 mg, Oral, 4x Daily  insulin lispro, 0-7 Units, Subcutaneous, TID AC  insulin lispro, 3 Units, Subcutaneous, TID With Meals  levothyroxine, 150 mcg, Oral, Q AM  metoprolol " succinate XL, 25 mg, Oral, Daily  montelukast, 10 mg, Oral, Nightly  nicotine, 1 patch, Transdermal, Q24H  pantoprazole, 40 mg, Oral, QAM  rivaroxaban, 15 mg, Oral, BID With Meals   Followed by  [START ON 1/5/2022] rivaroxaban, 20 mg, Oral, Daily With Dinner  sodium chloride, 1,000 mL, Intravenous, Once  sodium chloride, 10 mL, Intravenous, Q12H      Continuous Infusions:   PRN Meds:.dextrose  •  dextrose  •  diazePAM  •  glucagon (human recombinant)  •  ipratropium-albuterol  •  sodium chloride    Assessment/Plan   Assessment & Plan     Active Hospital Problems    Diagnosis  POA   • Atrial fibrillation ([facility]) [I48.91]  Yes   • Essential hypertension [I10]  Yes   • COPD (chronic obstructive pulmonary disease) ([facility]) [J44.9]  Yes   • Tobacco use [Z72.0]  Yes   • Type 2 diabetes mellitus ([facility]) [E11.9]  Yes   • Illicit drug use [F19.90]  Yes   • GERD (gastroesophageal reflux disease) [K21.9]  Yes   • Hypothyroidism [E03.9]  Yes   • NSTEMI (non-ST elevated myocardial infarction) ([facility]) [I21.4]  Yes   • Long term current use of anticoagulant therapy [Z79.01]  Not Applicable   • Hyperlipidemia [E78.5]  Yes   • CKD (chronic kidney disease) stage 4, GFR 15-29 ml/min ([facility]) [N18.4]  Yes   • Heroin withdrawal ([facility]) [F11.23]  Yes      Resolved Hospital Problems   No resolved problems to display.        Brief Hospital Course to date:  Osito Person is a 65 y.o. male presents to the ED with withdrawal symptoms from heroin use, seeking help with detox.  Last use of heroin was Sunday 12/12/21.    This patient's problems and plans were partially entered by my partner and updated as appropriate by me 12/18/21.       Elevated troponin   -- due to type II etiology  -- PET myocardial perfusion imaging consistent with small region of LAD distribution infarct with ethan-infarct ischemia. Cards not recommending [facility] at this time.    --continue ASA/statin/beta blocker    NORRIS  Edema   -Echo with EF 65%, mod MR, TR  -proBNP elevatd at 7,665  -s/p  IV Lasix 40mg q12 x 2 doses per Cards      Chronic Afib   - Continue Metoprolol and Xarelto      Elevated D dimer  Questionable PE on VQ scan   - anti-Xa low; likely not taking;  Started Xarelto 15 mg BID for 21 days then 20mg daily   - Unable to obtain CTA due to renal function      Heroin use  Withdrawal symptoms  -Continue supportive care   -Chemical Dependency to follow   - PRN valium   -Pt interested in inpatient rehab. Plan is for d/c to inpatient rehab on Monday     PHIL  -unknown baseline creatinine  -Cr 2.8 on admission, now 1.77  -s/p 1L normal saline bolus   -hold nephrotoxic agents  -hold lisinopril, further diuretics for now     Hyperkalemia  -S/P Lokelma, Dextrose/Insulin, Calcium gluconate, sodium bicarb 12/16  -- repeated lokelma yesterday, repeat K+ later yesterday afternoon showed improvement  -- resolved    Leukocytosis  Low grade temp (100.2)  Cough  -Repeat CXR shows fluid more likely than PNA and UA unremarkable  -procal and lactic wnl  -Repeat CBC w/diff in AM   --suspect viral etiology, COVID19 NEGATIVE.  Respiratory viral panel NEGATIVE     Diabetes mellitus type 2  - A1C 7.3  -Continue LDSSI   -fingersticks achs   -Added 3 units Humalog with meals      COPD  -CXR as above  -continuous pulse ox  -keep 02 sat >90%  -scheduled duo-nebs  -continue Singulair      Essential hypertension   -continue home lopressor      Hypothyroidism  -TSH low,  decreased Synthroid. Needs repeat TSH in 4-6 weeks     Hyperlipidemia  -continue statin   -LDL at goal     Tobacco abuse  -Counseled regarding cessation     DVT prophylaxis:  Medical DVT prophylaxis orders are present.       AM-PAC 6 Clicks Score (PT): 23 (12/17/21 0800)    Disposition: I expect the patient to be discharged on Monday when his [facility] will be open    CODE STATUS:   Code Status and Medical Interventions:   Ordered at: 12/15/21 0616     Code Status (Patient has no pulse and is not breathing):    CPR (Attempt to Resuscitate)      Medical Interventions (Patient has pulse or is breathing):    Full Support       Robyn Dasilva MD  12/18/21

## 2021-12-18 NOTE — THERAPY EVALUATION
Patient Name: Osito Person  : 1955    MRN: [id number]                              Today's Date: 2021       Admit Date: 12/15/2021    Visit Dx:     ICD-10-CM ICD-9-CM   1. Heroin withdrawal (HCC)  F11.23 292.0     304.00   2. Visual hallucination  R44.1 368.16   3. Nausea  R11.0 787.02   4. Elevated troponin  R77.8 790.6   5. Acute renal failure, unspecified acute renal failure type (HCC)  N17.9 584.9   6. Chronic atrial fibrillation ([facility])  I48.20 427.31   7. Chronic anticoagulation  Z79.01 V58.61   8. History of diabetes mellitus  Z86.39 V12.29   9. History of hypertension  Z86.79 V12.59   10. History of hyperlipidemia  Z86.39 V12.29   11. Tobacco dependence  F17.200 305.1   12. Impaired functional mobility, balance, gait, and endurance  Z74.09 V49.89     Patient Active Problem List   Diagnosis   • Atrial fibrillation ([facility])   • Essential hypertension   • COPD (chronic obstructive pulmonary disease) ([facility])   • Tobacco use   • Type 2 diabetes mellitus ([facility])   • Illicit drug use   • GERD (gastroesophageal reflux disease)   • Hypothyroidism   • NSTEMI (non-ST elevated myocardial infarction) ([facility])   • Long term current use of anticoagulant therapy   • Hyperlipidemia   • CKD (chronic kidney disease) stage 4, GFR 15-29 ml/min ([facility])   • Heroin withdrawal ([facility])     Past Medical History:   Diagnosis Date   • Diabetes mellitus ([facility])    • Hyperlipidemia    • Hypertension      History reviewed. No pertinent surgical history.   General Information     Row Name 21 1145          Physical Therapy Time and Intention    Document Type evaluation  -LS     Mode of Treatment physical therapy  -     Row Name 21 1145          General Information    Patient Profile Reviewed yes  -LS     Prior Level of Function independent:; community mobility; all household mobility  uses a cane sometimes, at baseline  -     Existing Precautions/Restrictions fall; other (see comments)  illicit drug use  -     Row Name  12/18/21 1145          Living Environment    Lives With alone  -LS     Row Name 12/18/21 1145          Home Main Entrance    Number of Stairs, Main Entrance none  -LS     Row Name 12/18/21 1145          Stairs Within Home, Primary    Stairs, Within Home, Primary pt said he lives in a ground-level apt  -LS     Number of Stairs, Within Home, Primary none  -LS     Row Name 12/18/21 1145          Cognition    Orientation Status (Cognition) oriented x 4  -LS     Row Name 12/18/21 1145          Safety Issues, Functional Mobility    Impairments Affecting Function (Mobility) balance; strength  -LS           User Key  (r) = Recorded By, (t) = Taken By, (c) = Cosigned By    Initials Name Provider Type    Angela Garcia, PT Physical Therapist               Mobility     Row Name 12/18/21 1145          Bed Mobility    Bed Mobility supine-sit  -LS     Supine-Sit [place] (Bed Mobility) independent  -LS     Comment (Bed Mobility) Per PT's clinical judgement, pt would be indep sit to supine and all bed mobility.  -LS     Row Name 12/18/21 1145          Sit-Stand Transfer    Sit-Stand [place] (Transfers) contact guard  -LS     Row Name 12/18/21 1145          Gait/Stairs (Locomotion)    [place] Level (Gait) verbal cues; minimum assist (75% patient effort)  HHA. Min A  needed initially progressing to CGA toward end of walk.  -LS     Distance in Feet (Gait) 300  -LS     Deviations/Abnormal Patterns (Gait) gait speed decreased; bilateral deviations; stride length decreased  -LS           User Key  (r) = Recorded By, (t) = Taken By, (c) = Cosigned By    Initials Name Provider Type    Angela Garcia PT Physical Therapist               Obj/Interventions     Row Name 12/18/21 1145          Range of Motion Comprehensive    General Range of Motion bilateral lower extremity ROM WFL; bilateral upper extremity ROM WFL  -LS     Row Name 12/18/21 1145          Strength Comprehensive (MMT)    Comment, General Manual  Muscle Testing (MMT) Assessment B hip flexors 4/5.B knee extensors 4+. R ankle dorsiflexors 4-. L ankle dorsiflexors 4. B sh flexors 4. B elbow flexors and B elbow extensors 5/5.  -     Row Name 12/18/21 1145          Balance    Balance Assessment standing dynamic balance  -LS     Dynamic Standing Balance mild impairment; unsupported; standing  -LS     Balance Interventions standing; sit to stand; weight shifting activity  -LS           User Key  (r) = Recorded By, (t) = Taken By, (c) = Cosigned By    Initials Name Provider Type    Angela Garcia, PT Physical Therapist               Goals/Plan     Row Name 12/18/21 1145          Transfer Goal 1 (PT)    Activity/Assistive Device (Transfer Goal 1, PT) sit-to-stand/stand-to-sit; cane, straight  -LS     [place] Level/Cues Needed (Transfer Goal 1, PT) modified independence  -LS     Time Frame (Transfer Goal 1, PT) 10 days  -LS     Progress/Outcome (Transfer Goal 1, PT) goal ongoing  -     Row Name 12/18/21 1145          Gait Training Goal 1 (PT)    Activity/Assistive Device (Gait Training Goal 1, PT) gait (walking locomotion); assistive device use; cane, straight  -LS     [place] Level (Gait Training Goal 1, PT) modified independence  -LS     Distance (Gait Training Goal 1, PT) 500  -LS     Time Frame (Gait Training Goal 1, PT) 10 days  -LS     Progress/Outcome (Gait Training Goal 1, PT) goal ongoing  -LS           User Key  (r) = Recorded By, (t) = Taken By, (c) = Cosigned By    Initials Name Provider Type    Angela Garcia, PT Physical Therapist               Clinical Impression     Row Name 12/18/21 1145          Pain    Additional Documentation Pain Scale: Numbers Pre/Post-Treatment (Group)  -     Row Name 12/18/21 1145          Pain Scale: Numbers Pre/Post-Treatment    Pretreatment Pain Rating 10/10  -LS     Posttreatment Pain Rating 10/10  -LS     Pain Location - Side Bilateral  -LS     Pain Location - Orientation lower  -LS      Pain Location extremity  -LS     Pre/Posttreatment Pain Comment Pt said he had neuropathy in both legs and that they always throb  -LS     Pain Intervention(s) Repositioned; Ambulation/increased activity  -LS     Row Name 12/18/21 1145          Plan of Care Review    Plan of Care Reviewed With patient  -LS     Outcome Summary PT eval completed. Pt independently transitioned supine to sit; CGA sit to stand; and amb 300' min A progressing to CGA toward end of walk. Pt would benefit from skilled PT services to improve funtional independence. Exprect good progress.  -LS     Row Name 12/18/21 1145          Therapy Assessment/Plan (PT)    Patient/Family Therapy Goals Statement (PT) Return to PLOF and get into a rehab program.  -LS     Rehab Potential (PT) good, to achieve stated therapy goals  -LS     Criteria for Skilled Interventions Met (PT) yes; meets criteria  -LS     Row Name 12/18/21 1145          Positioning and Restraints    Pre-Treatment Position in bed  -LS     Post Treatment Position chair  -LS     In Chair notified nsg; sitting; call light within reach; encouraged to call for assist; exit alarm on  -LS           User Key  (r) = Recorded By, (t) = Taken By, (c) = Cosigned By    Initials Name Provider Type    LS Angela Martinez, PT Physical Therapist               Outcome Measures     Row Name 12/18/21 1145 12/18/21 0820       How much help from another person do you currently need...    Turning from your back to your side while in flat bed without using bedrails? 4  -LS 4  -JG    Moving from lying on back to sitting on the side of a flat bed without bedrails? 4  -LS 4  -JG    Moving to and from a bed to a chair (including a wheelchair)? 3  -LS 3  -JG    Standing up from a chair using your arms (e.g., wheelchair, bedside chair)? 3  -LS 3  -JG    Climbing 3-5 steps with a railing? 3  -LS 3  -JG    To walk in hospital room? 3  -LS 3  -JG    AM-PAC 6 Clicks Score (PT) 20  -LS 20  -JG    Row Name 12/18/21 1146           Functional Assessment    Outcome Measure Options AM-PAC 6 Clicks Basic Mobility (PT)  -           User Key  (r) = Recorded By, (t) = Taken By, (c) = Cosigned By    Initials Name Provider Type    Angela Garcia, DENNIS Physical Therapist    Jovana Block LPN Licensed Nurse                             Physical Therapy Education                 Title: PT OT SLP Therapies (Done)     Topic: Physical Therapy (Done)     Point: Home exercise program (Done)     Learning Progress Summary           Patient Acceptance, E, VU by  at 12/18/2021 1145    Comment: Benefits of activity                               User Key     Initials Effective Dates Name Provider Type Discipline     06/16/21 -  Angela Martinez, PT Physical Therapist PT              PT Recommendation and Plan  Planned Therapy Interventions (PT): balance training, gait training, home exercise program, patient/family education, strengthening, transfer training  Plan of Care Reviewed With: patient  Outcome Summary: PT eval completed. Pt independently transitioned supine to sit; CGA sit to stand; and amb 300' min A progressing to CGA toward end of walk. Pt would benefit from skilled PT services to improve funtional independence. Exprect good progress.     Time Calculation:    PT Charges     Row Name 12/18/21 1145             Time Calculation    Start Time 1145  -      PT Received On 12/18/21  -      PT Goal Re-Cert Due Date 12/28/21  -              Untimed Charges    PT Eval/Re-eval Minutes 50  -LS              Total Minutes    Untimed Charges Total Minutes 50  -LS       Total Minutes 50  -LS            User Key  (r) = Recorded By, (t) = Taken By, (c) = Cosigned By    Initials Name Provider Type    Angela Garcia PT Physical Therapist              Therapy Charges for Today     Code Description Service Date Service Provider Modifiers Qty    [id number] HC PT EVAL LOW COMPLEXITY 4 12/18/2021 Angela Martinez, PT GP 1           PT G-Codes  Outcome Measure Options: AM-PAC 6 Clicks Basic Mobility (PT)  AM-PAC 6 Clicks Score (PT): 20    Angela Martinez, PT  12/18/2021

## 2021-12-19 LAB
ANION GAP SERPL CALCULATED.3IONS-SCNC: 10 MMOL/L (ref 5–15)
BASOPHILS # BLD AUTO: 0.1 10*3/MM3 (ref 0–0.2)
BASOPHILS NFR BLD AUTO: 1 % (ref 0–1.5)
BUN SERPL-MCNC: 32 MG/DL (ref 8–23)
BUN/CREAT SERPL: 19.6 (ref 7–25)
CALCIUM SPEC-SCNC: 8.9 MG/DL (ref 8.6–10.5)
CHLORIDE SERPL-SCNC: 105 MMOL/L (ref 98–107)
CO2 SERPL-SCNC: 26 MMOL/L (ref 22–29)
CREAT SERPL-MCNC: 1.63 MG/DL (ref 0.76–1.27)
DEPRECATED RDW RBC AUTO: 50.7 FL (ref 37–54)
EOSINOPHIL # BLD AUTO: 0.48 10*3/MM3 (ref 0–0.4)
EOSINOPHIL NFR BLD AUTO: 4.7 % (ref 0.3–6.2)
ERYTHROCYTE [DISTWIDTH] IN BLOOD BY AUTOMATED COUNT: 15.8 % (ref 12.3–15.4)
GFR SERPL CREATININE-BSD FRML MDRD: 43 ML/MIN/1.73
GLUCOSE BLDC GLUCOMTR-MCNC: 109 MG/DL (ref 70–130)
GLUCOSE BLDC GLUCOMTR-MCNC: 143 MG/DL (ref 70–130)
GLUCOSE BLDC GLUCOMTR-MCNC: 172 MG/DL (ref 70–130)
GLUCOSE BLDC GLUCOMTR-MCNC: 177 MG/DL (ref 70–130)
GLUCOSE SERPL-MCNC: 112 MG/DL (ref 65–99)
HCT VFR BLD AUTO: 37.5 % (ref 37.5–51)
HGB BLD-MCNC: 11.9 G/DL (ref 13–17.7)
IMM GRANULOCYTES # BLD AUTO: 0.03 10*3/MM3 (ref 0–0.05)
IMM GRANULOCYTES NFR BLD AUTO: 0.3 % (ref 0–0.5)
LYMPHOCYTES # BLD AUTO: 1.63 10*3/MM3 (ref 0.7–3.1)
LYMPHOCYTES NFR BLD AUTO: 15.9 % (ref 19.6–45.3)
MCH RBC QN AUTO: 27.9 PG (ref 26.6–33)
MCHC RBC AUTO-ENTMCNC: 31.7 G/DL (ref 31.5–35.7)
MCV RBC AUTO: 88 FL (ref 79–97)
MONOCYTES # BLD AUTO: 0.79 10*3/MM3 (ref 0.1–0.9)
MONOCYTES NFR BLD AUTO: 7.7 % (ref 5–12)
NEUTROPHILS NFR BLD AUTO: 7.2 10*3/MM3 (ref 1.7–7)
NEUTROPHILS NFR BLD AUTO: 70.4 % (ref 42.7–76)
NRBC BLD AUTO-RTO: 0 /100 WBC (ref 0–0.2)
PLATELET # BLD AUTO: 250 10*3/MM3 (ref 140–450)
PMV BLD AUTO: 12.1 FL (ref 6–12)
POTASSIUM SERPL-SCNC: 5 MMOL/L (ref 3.5–5.2)
QT INTERVAL: 338 MS
QTC INTERVAL: 404 MS
RBC # BLD AUTO: 4.26 10*6/MM3 (ref 4.14–5.8)
SODIUM SERPL-SCNC: 141 MMOL/L (ref 136–145)
WBC NRBC COR # BLD: 10.23 10*3/MM3 (ref 3.4–10.8)

## 2021-12-19 PROCEDURE — 63710000001 INSULIN LISPRO (HUMAN) PER 5 UNITS: Performed by: FAMILY MEDICINE

## 2021-12-19 PROCEDURE — 85025 COMPLETE CBC W/AUTO DIFF WBC: CPT | Performed by: INTERNAL MEDICINE

## 2021-12-19 PROCEDURE — 63710000001 INSULIN LISPRO (HUMAN) PER 5 UNITS: Performed by: PHYSICIAN ASSISTANT

## 2021-12-19 PROCEDURE — 25010000002 DIAZEPAM PER 5 MG: Performed by: INTERNAL MEDICINE

## 2021-12-19 PROCEDURE — 94799 UNLISTED PULMONARY SVC/PX: CPT

## 2021-12-19 PROCEDURE — 99233 SBSQ HOSP IP/OBS HIGH 50: CPT | Performed by: INTERNAL MEDICINE

## 2021-12-19 PROCEDURE — 80048 BASIC METABOLIC PNL TOTAL CA: CPT | Performed by: INTERNAL MEDICINE

## 2021-12-19 PROCEDURE — 94660 CPAP INITIATION&MGMT: CPT

## 2021-12-19 PROCEDURE — 82962 GLUCOSE BLOOD TEST: CPT

## 2021-12-19 RX ORDER — HYDROXYZINE HYDROCHLORIDE 25 MG/1
25 TABLET, FILM COATED ORAL ONCE
Status: COMPLETED | OUTPATIENT
Start: 2021-12-19 | End: 2021-12-19

## 2021-12-19 RX ADMIN — GABAPENTIN 400 MG: 400 CAPSULE ORAL at 18:05

## 2021-12-19 RX ADMIN — METOPROLOL SUCCINATE 25 MG: 25 TABLET, EXTENDED RELEASE ORAL at 09:08

## 2021-12-19 RX ADMIN — MONTELUKAST SODIUM 10 MG: 10 TABLET, COATED ORAL at 21:36

## 2021-12-19 RX ADMIN — RIVAROXABAN 15 MG: 15 TABLET, FILM COATED ORAL at 09:07

## 2021-12-19 RX ADMIN — DIAZEPAM 2.5 MG: 5 INJECTION, SOLUTION INTRAMUSCULAR; INTRAVENOUS at 23:14

## 2021-12-19 RX ADMIN — DIAZEPAM 2.5 MG: 5 INJECTION, SOLUTION INTRAMUSCULAR; INTRAVENOUS at 18:59

## 2021-12-19 RX ADMIN — HYDROXYZINE HYDROCHLORIDE 25 MG: 25 TABLET, FILM COATED ORAL at 03:59

## 2021-12-19 RX ADMIN — GABAPENTIN 400 MG: 400 CAPSULE ORAL at 09:08

## 2021-12-19 RX ADMIN — SODIUM CHLORIDE, PRESERVATIVE FREE 10 ML: 5 INJECTION INTRAVENOUS at 09:08

## 2021-12-19 RX ADMIN — ATORVASTATIN CALCIUM 10 MG: 10 TABLET, FILM COATED ORAL at 09:08

## 2021-12-19 RX ADMIN — LEVOTHYROXINE SODIUM 150 MCG: 150 TABLET ORAL at 06:00

## 2021-12-19 RX ADMIN — CETIRIZINE HYDROCHLORIDE 10 MG: 10 TABLET, FILM COATED ORAL at 09:08

## 2021-12-19 RX ADMIN — INSULIN LISPRO 3 UNITS: 100 INJECTION, SOLUTION INTRAVENOUS; SUBCUTANEOUS at 09:07

## 2021-12-19 RX ADMIN — RIVAROXABAN 15 MG: 15 TABLET, FILM COATED ORAL at 18:05

## 2021-12-19 RX ADMIN — PANTOPRAZOLE SODIUM 40 MG: 40 TABLET, DELAYED RELEASE ORAL at 09:07

## 2021-12-19 RX ADMIN — SODIUM CHLORIDE, PRESERVATIVE FREE 10 ML: 5 INJECTION INTRAVENOUS at 21:36

## 2021-12-19 RX ADMIN — DIAZEPAM 2.5 MG: 5 INJECTION, SOLUTION INTRAMUSCULAR; INTRAVENOUS at 02:47

## 2021-12-19 RX ADMIN — Medication 1 PATCH: at 09:11

## 2021-12-19 RX ADMIN — DIAZEPAM 2.5 MG: 5 INJECTION, SOLUTION INTRAMUSCULAR; INTRAVENOUS at 07:16

## 2021-12-19 RX ADMIN — INSULIN LISPRO 2 UNITS: 100 INJECTION, SOLUTION INTRAVENOUS; SUBCUTANEOUS at 12:48

## 2021-12-19 RX ADMIN — INSULIN LISPRO 2 UNITS: 100 INJECTION, SOLUTION INTRAVENOUS; SUBCUTANEOUS at 18:05

## 2021-12-19 RX ADMIN — GABAPENTIN 400 MG: 400 CAPSULE ORAL at 12:48

## 2021-12-19 RX ADMIN — INSULIN LISPRO 3 UNITS: 100 INJECTION, SOLUTION INTRAVENOUS; SUBCUTANEOUS at 18:05

## 2021-12-19 RX ADMIN — INSULIN LISPRO 3 UNITS: 100 INJECTION, SOLUTION INTRAVENOUS; SUBCUTANEOUS at 12:49

## 2021-12-19 RX ADMIN — GABAPENTIN 400 MG: 400 CAPSULE ORAL at 21:36

## 2021-12-19 NOTE — PROGRESS NOTES
[facility] Medicine Services  PROGRESS NOTE    Patient Name: sOito Person  : 1955  MRN: [id number]    Date of Admission: 12/15/2021  Primary Care Physician: Felix Milligan MD    Subjective   Subjective     CC:  F/U a fib, heroin abuse     HPI:  No issues overnight per RN notes. Pt sleeping    ROS:  UTO as pt sleeping    Objective   Objective     Vital Signs:   Temp:  [97.1 °F (36.2 °C)-98.7 °F (37.1 °C)] 98.1 °F (36.7 °C)  Heart Rate:  [] 78  Resp:  [18] 18  BP: ()/(61-92) 137/92  Flow (L/min):  [2] 2     Physical Exam:  Constitutional: No acute distress, chronically ill appearing   HENT: NCAT, mucous membranes moist  Respiratory: Decreased in bases bilaterally with wheezes/rales, respiratory effort normal 2L NC  Cardiovascular: RRR, no murmurs, rubs, or gallops  Gastrointestinal: Positive bowel sounds, soft, nontender, nondistended  Musculoskeletal: no BLE edema  Neurologic:  no focal deficits   Skin: No rashes    Results Reviewed:  LAB RESULTS:      Lab 21  0412 21  0558 21  1444 21  1045 12/15/21  1332 12/15/21  0445 12/15/21  0238 12/15/21  0200   WBC 10.47 14.28*  --  15.88*  --   --  10.95*  --    HEMOGLOBIN 12.2* 10.7*  --  10.8*  --   --  10.5*  --    HEMATOCRIT 39.8 33.7*  --  35.7*  --   --  32.5*  --    PLATELETS 252 226  --  231  --   --  236  --    NEUTROS ABS 7.42* 11.29*  --  13.58*  --   --  8.10*  --    IMMATURE GRANS (ABS) 0.05 0.06*  --  0.07*  --   --  0.04  --    LYMPHS ABS 1.60 1.39  --  0.88  --   --  1.57  --    MONOS ABS 0.79 1.32*  --  1.21*  --   --  0.85  --    EOS ABS 0.53* 0.16  --  0.06  --   --  0.31  --    MCV 88.2 86.4  --  89.9  --   --  86.2  --    CRP  --   --   --   --   --   --   --  1.10*   PROCALCITONIN  --   --   --  0.08  --   --   --  0.08   LACTATE  --   --  1.3  --   --   --   --  1.2   LDH  --   --   --   --   --   --   --  238*   HEPARIN ANTI-XA  --   --   --   --  0.10*  --   --   --    D  DIMER QUANT  --   --   --   --   --  0.74*  --   --          Lab 12/18/21  0412 12/17/21  1337 12/17/21  0558 12/16/21  1944 12/16/21  1444 12/16/21  1045 12/16/21  1045 12/15/21  1332 12/15/21  1332 12/15/21  0445 12/15/21  0238 12/15/21  0200   SODIUM 143  --  139 138  --   --  136  --  141  --   --    < >   POTASSIUM 4.8 4.7 5.4* 5.9* 6.3*   < > 6.7*   < > 5.5*  --   --    < >   CHLORIDE 103  --  103 103  --   --  105  --  109*  --   --    < >   CO2 29.0  --  27.0 27.0  --   --  23.0  --  24.0  --   --    < >   ANION GAP 11.0  --  9.0 8.0  --   --  8.0  --  8.0  --   --    < >   BUN 32*  --  33* 36*  --   --  36*  --  44*  --   --    < >   CREATININE 1.96*  --  1.77* 2.03*  --   --  1.90*  --  2.22*  --   --    < >   GLUCOSE 108*  --  136* 264*  --   --  180*  --  77  --   --    < >   CALCIUM 8.9  --  9.1 9.0  --   --  8.7  --  8.2*  --   --    < >   MAGNESIUM  --   --   --   --   --   --   --   --   --  1.8  --   --    HEMOGLOBIN A1C  --   --   --   --   --   --   --   --   --   --  7.30*  --    TSH  --   --   --   --   --   --   --   --   --  0.120*  --   --     < > = values in this interval not displayed.         Lab 12/15/21  0200   TOTAL PROTEIN 5.8*   ALBUMIN 3.20*   GLOBULIN 2.6   ALT (SGPT) 10   AST (SGOT) 20   BILIRUBIN 0.2   ALK PHOS 94   LIPASE 20         Lab 12/16/21  1045 12/15/21  0445 12/15/21  0200   PROBNP 7,665.0*  --   --    TROPONIN T  --  0.124* 0.115*         Lab 12/15/21  0445   CHOLESTEROL 92   LDL CHOL 39   HDL CHOL 41   TRIGLYCERIDES 48             Brief Urine Lab Results  (Last result in the past 365 days)      Color   Clarity   Blood   Leuk Est   Nitrite   Protein   CREAT   Urine HCG        12/16/21 1445 Yellow   Clear   Moderate (2+)   Negative   Negative   100 mg/dL (2+)                 Microbiology Results Abnormal     Procedure Component Value - Date/Time    COVID PRE-OP / PRE-PROCEDURE SCREENING ORDER (NO ISOLATION) - Swab, Nasopharynx [[id number]]  (Normal) Collected: 12/18/21  0750    Lab Status: Final result Specimen: Swab from Nasopharynx Updated: 12/18/21 0841    Narrative:      The following orders were created for panel order COVID PRE-OP / PRE-PROCEDURE SCREENING ORDER (NO ISOLATION) - Swab, Nasopharynx.  Procedure                               Abnormality         Status                     ---------                               -----------         ------                     Respiratory Panel PCR w/...[[id number]]  Normal              Final result                 Please view results for these tests on the individual orders.    Respiratory Panel PCR w/COVID-19(SARS-CoV-2) CHAKA/CHAY/ADOLPH/PAD/COR/MAD/ERNESTINE In-House, NP Swab in UTM/VTM, 3-4 HR TAT - Swab, Nasopharynx [[id number]]  (Normal) Collected: 12/18/21 0750    Lab Status: Final result Specimen: Swab from Nasopharynx Updated: 12/18/21 0841     ADENOVIRUS, PCR Not Detected     Coronavirus 229E Not Detected     Coronavirus HKU1 Not Detected     Coronavirus NL63 Not Detected     Coronavirus OC43 Not Detected     COVID19 Not Detected     Human Metapneumovirus Not Detected     Human Rhinovirus/Enterovirus Not Detected     Influenza A PCR Not Detected     Influenza B PCR Not Detected     Parainfluenza Virus 1 Not Detected     Parainfluenza Virus 2 Not Detected     Parainfluenza Virus 3 Not Detected     Parainfluenza Virus 4 Not Detected     RSV, PCR Not Detected     Bordetella pertussis pcr Not Detected     Bordetella parapertussis PCR Not Detected     Chlamydophila pneumoniae PCR Not Detected     Mycoplasma pneumo by PCR Not Detected    Narrative:      In the setting of a positive respiratory panel with a viral infection PLUS a negative procalcitonin without other underlying concern for bacterial infection, consider observing off antibiotics or discontinuation of antibiotics and continue supportive care. If the respiratory panel is positive for atypical bacterial infection (Bordetella pertussis, Chlamydophila pneumoniae, or Mycoplasma  "pneumoniae), consider antibiotic de-escalation to target atypical bacterial infection.    Eosinophil Smear - Urine, Urine, Clean Catch [[id number]]  (Normal) Collected: 12/15/21 0344    Lab Status: Final result Specimen: Urine, Clean Catch Updated: 12/15/21 1000     Eosinophil Smear 0 % EOS/100 Cells     Narrative:      No eosinophil seen    COVID PRE-OP / PRE-PROCEDURE SCREENING ORDER (NO ISOLATION) - Swab, Nasopharynx [[id number]]  (Normal) Collected: 12/15/21 0441    Lab Status: Final result Specimen: Swab from Nasopharynx Updated: 12/15/21 0512    Narrative:      The following orders were created for panel order COVID PRE-OP / PRE-PROCEDURE SCREENING ORDER (NO ISOLATION) - Swab, Nasopharynx.  Procedure                               Abnormality         Status                     ---------                               -----------         ------                     COVID-19 and FLU A/B PCR...[[id number]]  Normal              Final result                 Please view results for these tests on the individual orders.    COVID-19 and FLU A/B PCR - Swab, Nasopharynx [[id number]]  (Normal) Collected: 12/15/21 0441    Lab Status: Final result Specimen: Swab from Nasopharynx Updated: 12/15/21 0512     COVID19 Not Detected     Influenza A PCR Not Detected     Influenza B PCR Not Detected    Narrative:      Fact sheet for providers: https://www.fda.gov/media/561963/download    Fact sheet for patients: https://www.fda.gov/media/660011/download    Test performed by PCR.          Stress Test With Pet Myocardial Perfusion (MULTI STUDY, REST AND STRESS)    Result Date: 12/17/2021  · Patient denied chest discomfort/pain during stress; did report \"feeling weird\" and short of breath, after the Lexiscan was given; back to baseline during recovery. · Abnormal ECG at baseline (A-Fib); no significant ST or T wave changes identified. · REST EF = 39% STRESS EF = 41%. · There is a largely fixed anteroapical defect on both rest and stress " studies noted. There is ethan-infarct ischemia only.        Results for orders placed during the hospital encounter of 12/15/21    Adult Transthoracic Echo Complete W/ Cont if Necessary Per Protocol    Interpretation Summary  · Left ventricular systolic function is normal. Estimated left ventricular EF = 65%.  · Left ventricular wall thickness is consistent with borderline concentric hypertrophy.  · Moderate mitral valve regurgitation is present.  · Moderate tricuspid valve regurgitation is present.  · Estimated right ventricular systolic pressure from tricuspid regurgitation is moderately elevated (45-55 mmHg).      I have reviewed the medications:  Scheduled Meds:Pharmacy Consult, , Does not apply, BID  atorvastatin, 10 mg, Oral, Daily  cetirizine, 10 mg, Oral, Daily  gabapentin, 400 mg, Oral, 4x Daily  insulin lispro, 0-7 Units, Subcutaneous, TID AC  insulin lispro, 3 Units, Subcutaneous, TID With Meals  levothyroxine, 150 mcg, Oral, Q AM  metoprolol succinate XL, 25 mg, Oral, Daily  montelukast, 10 mg, Oral, Nightly  nicotine, 1 patch, Transdermal, Q24H  pantoprazole, 40 mg, Oral, QAM  rivaroxaban, 15 mg, Oral, BID With Meals   Followed by  [START ON 1/5/2022] rivaroxaban, 20 mg, Oral, Daily With Dinner  sodium chloride, 1,000 mL, Intravenous, Once  sodium chloride, 10 mL, Intravenous, Q12H      Continuous Infusions:   PRN Meds:.dextrose  •  dextrose  •  diazePAM  •  glucagon (human recombinant)  •  ipratropium-albuterol  •  sodium chloride    Assessment/Plan   Assessment & Plan     Active Hospital Problems    Diagnosis  POA   • Atrial fibrillation (HCC) [I48.91]  Yes   • Essential hypertension [I10]  Yes   • COPD (chronic obstructive pulmonary disease) (HCC) [J44.9]  Yes   • Tobacco use [Z72.0]  Yes   • Type 2 diabetes mellitus (HCC) [E11.9]  Yes   • Illicit drug use [F19.90]  Yes   • GERD (gastroesophageal reflux disease) [K21.9]  Yes   • Hypothyroidism [E03.9]  Yes   • NSTEMI (non-ST elevated myocardial  infarction) ([facility]) [I21.4]  Yes   • Long term current use of anticoagulant therapy [Z79.01]  Not Applicable   • Hyperlipidemia [E78.5]  Yes   • CKD (chronic kidney disease) stage 4, GFR 15-29 ml/min ([facility]) [N18.4]  Yes   • Heroin withdrawal ([facility]) [F11.23]  Yes      Resolved Hospital Problems   No resolved problems to display.        Brief Hospital Course to date:  Osito Person is a 65 y.o. male presents to the ED with withdrawal symptoms from heroin use, seeking help with detox.  Last use of heroin was Sunday 12/12/21.    This patient's problems and plans were partially entered by my partner and updated as appropriate by me 12/19/21.       Elevated troponin   -- due to type II etiology  -- PET myocardial perfusion imaging consistent with small region of LAD distribution infarct with ethan-infarct ischemia. Cards not recommending LHC at this time.    --continue ASA/statin/beta blocker    Hypervolemia  -Echo with EF 65%, mod MR, TR  -proBNP elevatd at 7,665  -s/p IV Lasix 40mg q12 x 2 doses per Cards      Chronic Afib   - Continue Metoprolol and Xarelto      Elevated D dimer  Questionable PE on VQ scan   - anti-Xa low; likely not taking;  Started Xarelto 15 mg BID for 21 days then 20mg daily   - Unable to obtain CTA due to renal function      Heroin use  Withdrawal symptoms  -Continue supportive care   -Chemical Dependency to follow   - PRN valium   -Pt interested in inpatient rehab. Plan is for d/c to inpatient rehab on Monday     PHIL  -unknown baseline creatinine  -Cr 2.8 on admission, now improved to 1.63  -s/p 1L normal saline bolus   -hold nephrotoxic agents  -hold lisinopril, further diuretics for now     Hyperkalemia  -S/P Lokelma, Dextrose/Insulin, Calcium gluconate, sodium bicarb 12/16  -- repeated lokelma 12/17  -- resolved    Leukocytosis  Low grade temp (100.2)  Cough  -Repeat CXR shows fluid more likely than PNA and UA unremarkable  -procal and lactic wnl  -Repeat CBC w/diff in AM   --suspect viral  etiology, COVID19 NEGATIVE.  Respiratory viral panel NEGATIVE     Diabetes mellitus type 2  - A1C 7.3  -Continue LDSSI   -fingersticks achs   -Added 3 units Humalog with meals      COPD  -CXR as above  -continuous pulse ox  -keep 02 sat >90%  -scheduled duo-nebs  -continue Singulair      Essential hypertension   -continue home lopressor      Hypothyroidism  -TSH low,  decreased Synthroid. Needs repeat TSH in 4-6 weeks     Hyperlipidemia  -continue statin   -LDL at goal     Tobacco abuse  -Counseled regarding cessation     DVT prophylaxis:  Medical DVT prophylaxis orders are present.       AM-PAC 6 Clicks Score (PT): 20 (12/18/21 1145)    Disposition: I expect the patient to be discharged on Monday when [facility] in [place] will be open    CODE STATUS:   Code Status and Medical Interventions:   Ordered at: 12/15/21 0616     Code Status (Patient has no pulse and is not breathing):    CPR (Attempt to Resuscitate)     Medical Interventions (Patient has pulse or is breathing):    Full Support       Robyn Dasilva MD  12/19/21

## 2021-12-20 VITALS
HEART RATE: 77 BPM | TEMPERATURE: 98.2 F | WEIGHT: 210.72 LBS | HEIGHT: 68 IN | DIASTOLIC BLOOD PRESSURE: 79 MMHG | BODY MASS INDEX: 31.94 KG/M2 | RESPIRATION RATE: 19 BRPM | OXYGEN SATURATION: 98 % | SYSTOLIC BLOOD PRESSURE: 120 MMHG

## 2021-12-20 PROBLEM — F11.93 HEROIN WITHDRAWAL (HCC): Status: RESOLVED | Noted: 2021-12-15 | Resolved: 2021-12-20

## 2021-12-20 LAB
GLUCOSE BLDC GLUCOMTR-MCNC: 116 MG/DL (ref 70–130)
GLUCOSE BLDC GLUCOMTR-MCNC: 157 MG/DL (ref 70–130)

## 2021-12-20 PROCEDURE — 94799 UNLISTED PULMONARY SVC/PX: CPT

## 2021-12-20 PROCEDURE — 25010000002 DIAZEPAM PER 5 MG: Performed by: INTERNAL MEDICINE

## 2021-12-20 PROCEDURE — 99239 HOSP IP/OBS DSCHRG MGMT >30: CPT | Performed by: INTERNAL MEDICINE

## 2021-12-20 PROCEDURE — 63710000001 INSULIN LISPRO (HUMAN) PER 5 UNITS: Performed by: FAMILY MEDICINE

## 2021-12-20 PROCEDURE — 82962 GLUCOSE BLOOD TEST: CPT

## 2021-12-20 PROCEDURE — 94660 CPAP INITIATION&MGMT: CPT

## 2021-12-20 RX ORDER — NICOTINE 21 MG/24HR
1 PATCH, TRANSDERMAL 24 HOURS TRANSDERMAL
Qty: 28 PATCH | Refills: 0 | Status: SHIPPED | OUTPATIENT
Start: 2021-12-21

## 2021-12-20 RX ORDER — GABAPENTIN 400 MG/1
400 CAPSULE ORAL 4 TIMES DAILY
Qty: 12 CAPSULE | Refills: 0 | Status: SHIPPED | OUTPATIENT
Start: 2021-12-20

## 2021-12-20 RX ORDER — LEVOTHYROXINE SODIUM 0.15 MG/1
150 TABLET ORAL
Qty: 30 TABLET | Refills: 0 | Status: SHIPPED | OUTPATIENT
Start: 2021-12-21

## 2021-12-20 RX ORDER — DIAZEPAM 2 MG/1
2 TABLET ORAL 2 TIMES DAILY PRN
Qty: 6 TABLET | Refills: 0 | Status: SHIPPED | OUTPATIENT
Start: 2021-12-20

## 2021-12-20 RX ADMIN — DIAZEPAM 2.5 MG: 5 INJECTION, SOLUTION INTRAMUSCULAR; INTRAVENOUS at 03:21

## 2021-12-20 RX ADMIN — RIVAROXABAN 15 MG: 15 TABLET, FILM COATED ORAL at 08:13

## 2021-12-20 RX ADMIN — Medication 1 PATCH: at 08:13

## 2021-12-20 RX ADMIN — METOPROLOL SUCCINATE 25 MG: 25 TABLET, EXTENDED RELEASE ORAL at 08:13

## 2021-12-20 RX ADMIN — LEVOTHYROXINE SODIUM 150 MCG: 150 TABLET ORAL at 06:11

## 2021-12-20 RX ADMIN — CETIRIZINE HYDROCHLORIDE 10 MG: 10 TABLET, FILM COATED ORAL at 08:13

## 2021-12-20 RX ADMIN — ATORVASTATIN CALCIUM 10 MG: 10 TABLET, FILM COATED ORAL at 08:13

## 2021-12-20 RX ADMIN — GABAPENTIN 400 MG: 400 CAPSULE ORAL at 08:13

## 2021-12-20 RX ADMIN — DIAZEPAM 2.5 MG: 5 INJECTION, SOLUTION INTRAMUSCULAR; INTRAVENOUS at 08:51

## 2021-12-20 RX ADMIN — INSULIN LISPRO 3 UNITS: 100 INJECTION, SOLUTION INTRAVENOUS; SUBCUTANEOUS at 08:13

## 2021-12-20 RX ADMIN — PANTOPRAZOLE SODIUM 40 MG: 40 TABLET, DELAYED RELEASE ORAL at 08:13

## 2021-12-20 NOTE — CASE MANAGEMENT/SOCIAL WORK
Continued Stay Note  [facility]     Patient Name: Osito Person  MRN: [id number]  Today's Date: 12/20/2021    Admit Date: 12/15/2021     Discharge Plan     Row Name 12/20/21 1329       Plan    Plan Home    Patient/Family in Agreement with Plan yes    Plan Comments Spoke with patient at bedside. He is agreeable for discharge home to follow up as an outpatient at [facility]. He denied further dc needs, his nephew will transport.    Final Discharge Disposition Code 01 - home or self-care    Row Name 12/20/21 1144       Plan    Plan Home               Discharge Codes    No documentation.               Expected Discharge Date and Time     Expected Discharge Date Expected Discharge Time    Dec 20, 2021             Sandi Ware RN

## 2021-12-20 NOTE — DISCHARGE SUMMARY
[facility] Medicine Services  DISCHARGE SUMMARY    Patient Name: Osito Person  : 1955  MRN: [id number]    Date of Admission: 12/15/2021  1:59 AM  Date of Discharge:  2021  Primary Care Physician: Felix Milligan MD    Consults     Date and Time Order Name Status Description    12/15/2021  6:08 AM Inpatient Cardiology Consult Completed           Hospital Course     Presenting Problem:   Heroin withdrawal (HCC) [F11.23]    Active Hospital Problems    Diagnosis  POA   • NSTEMI (non-ST elevated myocardial infarction) (Formerly [facility]) [I21.4]  Yes     Priority: High   • Atrial fibrillation (Formerly [facility]) [I48.91]  Yes   • Essential hypertension [I10]  Yes   • COPD (chronic obstructive pulmonary disease) (Formerly [facility]) [J44.9]  Yes   • Tobacco use [Z72.0]  Yes   • Type 2 diabetes mellitus (Formerly [facility]) [E11.9]  Yes   • Illicit drug use [F19.90]  Yes   • GERD (gastroesophageal reflux disease) [K21.9]  Yes   • Hypothyroidism [E03.9]  Yes   • Long term current use of anticoagulant therapy [Z79.01]  Not Applicable   • Hyperlipidemia [E78.5]  Yes   • CKD (chronic kidney disease) stage 4, GFR 15-29 ml/min (Formerly [facility]) [N18.4]  Yes      Resolved Hospital Problems    Diagnosis Date Resolved POA   • Heroin withdrawal (HCC) [F11.23] 2021 Yes     Priority: High          Hospital Course:  Osito Person is a 65 y.o. male  presents to the ED with withdrawal symptoms from heroin use, seeking help with detox.  Last use of heroin was 21.        Elevated troponin   -- due to type II etiology  -- PET myocardial perfusion imaging consistent with small region of LAD distribution infarct with ethan-infarct ischemia. Cards not recommending LHC at this time.    --continue ASA/statin/beta blocker     Hypervolemia  -Echo with EF 65%, mod MR, TR  -proBNP elevatd at 7,665  -s/p IV Lasix 40mg q12 x 2 doses per Cards      Chronic Afib   - Continue Metoprolol and Xarelto      Elevated D dimer  Questionable PE on VQ scan   - anti-Xa  low; likely not taking;  Started Xarelto 15 mg BID for 21 days then 20mg daily   - Unable to obtain CTA due to renal function      Heroin use  Withdrawal symptoms  -Continue supportive care   -Chemical Dependency has seen  - PRN valium- I prescribed three days of 2mg BID PRN upon d/c  -Pt interested in inpatient rehab. Plan is for d/c to rehab in [place] today     PHIL  -unknown baseline creatinine  -Cr 2.8 on admission, now improved to 1.63  -s/p 1L normal saline bolus   -hold nephrotoxic agents  -resume ACEI     Hyperkalemia  -S/P Lokelma, Dextrose/Insulin, Calcium gluconate, sodium bicarb 12/16  -- repeated lokelma 12/17  -- resolved     Leukocytosis-resolved  Low grade temp (100.2)- resolved  Cough  -Repeat CXR shows fluid more likely than PNA and UA unremarkable  -procal and lactic wnl  --suspect viral etiology, COVID19 NEGATIVE.  Respiratory viral panel NEGATIVE     Diabetes mellitus type 2  - A1C 7.3  -resume home regimen upon d/c  --continue Gabapentin but at lower than prior dose upon d/c     COPD  -scheduled duo-nebs  -continue Singulair      Essential hypertension   -continue home lopressor      Hypothyroidism  -TSH low/overly suppressed,  decreased Synthroid. Needs repeat TSH in 4-6 weeks     Hyperlipidemia  -continue statin   -LDL at goal     Tobacco abuse  -Counseled regarding cessation          Discharge Follow Up Recommendations for outpatient labs/diagnostics:   Follow up with PCP within one week     Day of Discharge     HPI:   Pt doing well today, states that he wants to go home to see his family before [facility].     Review of Systems  Gen- No fevers, chills  CV- No chest pain, palpitations  Resp- No cough, dyspnea  GI- No N/V/D, abd pain      Vital Signs:   Temp:  [95.5 °F (35.3 °C)-98.2 °F (36.8 °C)] 98.2 °F (36.8 °C)  Heart Rate:  [] 77  Resp:  [18-19] 19  BP: (101-145)/(69-82) 120/79     Physical Exam:  Constitutional: No acute distress, chronically ill appearing   HENT: NCAT, mucous  membranes moist  Respiratory: Decreased in bases bilaterally with wheezes/rales, respiratory effort normal 2L NC  Cardiovascular: RRR, no murmurs, rubs, or gallops  Gastrointestinal: Positive bowel sounds, soft, nontender, nondistended  Musculoskeletal: no BLE edema  Neurologic:  no focal deficits   Skin: No rashes    Pertinent  and/or Most Recent Results     LAB RESULTS:      Lab 12/19/21  0443 12/18/21  0412 12/17/21  0558 12/16/21  1444 12/16/21  1045 12/15/21  1332 12/15/21  0445 12/15/21  0238 12/15/21  0200   WBC 10.23 10.47 14.28*  --  15.88*  --   --  10.95*  --    HEMOGLOBIN 11.9* 12.2* 10.7*  --  10.8*  --   --  10.5*  --    HEMATOCRIT 37.5 39.8 33.7*  --  35.7*  --   --  32.5*  --    PLATELETS 250 252 226  --  231  --   --  236  --    NEUTROS ABS 7.20* 7.42* 11.29*  --  13.58*  --   --  8.10*  --    IMMATURE GRANS (ABS) 0.03 0.05 0.06*  --  0.07*  --   --  0.04  --    LYMPHS ABS 1.63 1.60 1.39  --  0.88  --   --  1.57  --    MONOS ABS 0.79 0.79 1.32*  --  1.21*  --   --  0.85  --    EOS ABS 0.48* 0.53* 0.16  --  0.06  --   --  0.31  --    MCV 88.0 88.2 86.4  --  89.9  --   --  86.2  --    CRP  --   --   --   --   --   --   --   --  1.10*   PROCALCITONIN  --   --   --   --  0.08  --   --   --  0.08   LACTATE  --   --   --  1.3  --   --   --   --  1.2   LDH  --   --   --   --   --   --   --   --  238*   HEPARIN ANTI-XA  --   --   --   --   --  0.10*  --   --   --    D DIMER QUANT  --   --   --   --   --   --  0.74*  --   --          Lab 12/19/21  0443 12/18/21  0412 12/17/21  1337 12/17/21  0558 12/16/21  1944 12/16/21  1444 12/16/21  1045 12/15/21  1332 12/15/21  0445 12/15/21  0238   SODIUM 141 143  --  139 138  --  136   < >  --   --    POTASSIUM 5.0 4.8 4.7 5.4* 5.9*   < > 6.7*   < >  --   --    CHLORIDE 105 103  --  103 103  --  105   < >  --   --    CO2 26.0 29.0  --  27.0 27.0  --  23.0   < >  --   --    ANION GAP 10.0 11.0  --  9.0 8.0  --  8.0   < >  --   --    BUN 32* 32*  --  33* 36*  --  36*   <  >  --   --    CREATININE 1.63* 1.96*  --  1.77* 2.03*  --  1.90*   < >  --   --    GLUCOSE 112* 108*  --  136* 264*  --  180*   < >  --   --    CALCIUM 8.9 8.9  --  9.1 9.0  --  8.7   < >  --   --    MAGNESIUM  --   --   --   --   --   --   --   --  1.8  --    HEMOGLOBIN A1C  --   --   --   --   --   --   --   --   --  7.30*   TSH  --   --   --   --   --   --   --   --  0.120*  --     < > = values in this interval not displayed.         Lab 12/15/21  0200   TOTAL PROTEIN 5.8*   ALBUMIN 3.20*   GLOBULIN 2.6   ALT (SGPT) 10   AST (SGOT) 20   BILIRUBIN 0.2   ALK PHOS 94   LIPASE 20         Lab 12/16/21  1045 12/15/21  0445 12/15/21  0200   PROBNP 7,665.0*  --   --    TROPONIN T  --  0.124* 0.115*         Lab 12/15/21  0445   CHOLESTEROL 92   LDL CHOL 39   HDL CHOL 41   TRIGLYCERIDES 48             Brief Urine Lab Results  (Last result in the past 365 days)      Color   Clarity   Blood   Leuk Est   Nitrite   Protein   CREAT   Urine HCG        12/16/21 1445 Yellow   Clear   Moderate (2+)   Negative   Negative   100 mg/dL (2+)               Microbiology Results (last 10 days)     Procedure Component Value - Date/Time    COVID PRE-OP / PRE-PROCEDURE SCREENING ORDER (NO ISOLATION) - Swab, Nasopharynx [[id number]]  (Normal) Collected: 12/18/21 0750    Lab Status: Final result Specimen: Swab from Nasopharynx Updated: 12/18/21 0841    Narrative:      The following orders were created for panel order COVID PRE-OP / PRE-PROCEDURE SCREENING ORDER (NO ISOLATION) - Swab, Nasopharynx.  Procedure                               Abnormality         Status                     ---------                               -----------         ------                     Respiratory Panel PCR w/...[[id number]]  Normal              Final result                 Please view results for these tests on the individual orders.    Respiratory Panel PCR w/COVID-19(SARS-CoV-2) CHAKA/CHAY/ADOLPH/PAD/COR/MAD/ERNESTINE In-House, NP Swab in UTM/VTM, 3-4 HR TAT - Swab,  Nasopharynx [[id number]]  (Normal) Collected: 12/18/21 0750    Lab Status: Final result Specimen: Swab from Nasopharynx Updated: 12/18/21 0841     ADENOVIRUS, PCR Not Detected     Coronavirus 229E Not Detected     Coronavirus HKU1 Not Detected     Coronavirus NL63 Not Detected     Coronavirus OC43 Not Detected     COVID19 Not Detected     Human Metapneumovirus Not Detected     Human Rhinovirus/Enterovirus Not Detected     Influenza A PCR Not Detected     Influenza B PCR Not Detected     Parainfluenza Virus 1 Not Detected     Parainfluenza Virus 2 Not Detected     Parainfluenza Virus 3 Not Detected     Parainfluenza Virus 4 Not Detected     RSV, PCR Not Detected     Bordetella pertussis pcr Not Detected     Bordetella parapertussis PCR Not Detected     Chlamydophila pneumoniae PCR Not Detected     Mycoplasma pneumo by PCR Not Detected    Narrative:      In the setting of a positive respiratory panel with a viral infection PLUS a negative procalcitonin without other underlying concern for bacterial infection, consider observing off antibiotics or discontinuation of antibiotics and continue supportive care. If the respiratory panel is positive for atypical bacterial infection (Bordetella pertussis, Chlamydophila pneumoniae, or Mycoplasma pneumoniae), consider antibiotic de-escalation to target atypical bacterial infection.    COVID PRE-OP / PRE-PROCEDURE SCREENING ORDER (NO ISOLATION) - Swab, Nasopharynx [[id number]]  (Normal) Collected: 12/15/21 0441    Lab Status: Final result Specimen: Swab from Nasopharynx Updated: 12/15/21 0512    Narrative:      The following orders were created for panel order COVID PRE-OP / PRE-PROCEDURE SCREENING ORDER (NO ISOLATION) - Swab, Nasopharynx.  Procedure                               Abnormality         Status                     ---------                               -----------         ------                     COVID-19 and FLU A/B PCR...[[id number]]  Normal              Final  result                 Please view results for these tests on the individual orders.    COVID-19 and FLU A/B PCR - Swab, Nasopharynx [[id number]]  (Normal) Collected: 12/15/21 0441    Lab Status: Final result Specimen: Swab from Nasopharynx Updated: 12/15/21 0512     COVID19 Not Detected     Influenza A PCR Not Detected     Influenza B PCR Not Detected    Narrative:      Fact sheet for providers: https://www.fda.gov/media/239896/download    Fact sheet for patients: https://www.fda.gov/media/017623/download    Test performed by PCR.    Eosinophil Smear - Urine, Urine, Clean Catch [[id number]]  (Normal) Collected: 12/15/21 0344    Lab Status: Final result Specimen: Urine, Clean Catch Updated: 12/15/21 1000     Eosinophil Smear 0 % EOS/100 Cells     Narrative:      No eosinophil seen          Adult Transthoracic Echo Complete W/ Cont if Necessary Per Protocol    Result Date: 12/15/2021  · Left ventricular systolic function is normal. Estimated left ventricular EF = 65%. · Left ventricular wall thickness is consistent with borderline concentric hypertrophy. · Moderate mitral valve regurgitation is present. · Moderate tricuspid valve regurgitation is present. · Estimated right ventricular systolic pressure from tricuspid regurgitation is moderately elevated (45-55 mmHg).      NM Lung Ventilation Perfusion    Result Date: 12/15/2021  EXAMINATION: NM LUNG VENTILATION PERFUSION-  INDICATION: Elevated D-dimer; F11.23-Opioid dependence with withdrawal; R44.1-Visual hallucinations; R11.0-Nausea; R77.8-Other specified abnormalities of plasma proteins; N17.9-Acute kidney failure, unspecified; I48.20-Chronic atrial fibrillation, unspecified; Z79.01-Long term (current) use of anticoagulants; Z86.39-Personal history of other endocrine, nutritional and metabolic disease; shortness of breath.  TECHNIQUE: 5.5 mCi of technetium 99m MAA was injected intravenously. Imaging was obtained of the lung fields in the anterior, posterior, and  oblique projections.  COMPARISON: Chest x-ray dated the same date.  FINDINGS: There are two small subsegmental defects identified within the right lung in the inferior and lateral aspect. There is homogeneous uptake of tracer seen within the left lung. The heart is enlarged. No pleural effusion or pneumothorax.      Intermediate probability for pulmonary embolism with two small subsegmental defects identified one in the right mid lung in the lateral aspect and the second in the right lower lung.  D:  12/15/2021 E:  12/15/2021    This report was finalized on 12/15/2021 4:53 PM by Dr. Shira Parra MD.      XR Chest 1 View    Result Date: 12/17/2021  EXAMINATION: XR CHEST 1 VW-  INDICATION: leukocytosis, cough; F11.23-Opioid dependence with withdrawal; R44.1-Visual hallucinations; R11.0-Nausea; R77.8-Other specified abnormalities of plasma proteins; N17.9-Acute kidney failure, unspecified; I48.20-Chronic atrial fibrillation, unspecified; Z79.01-Long term (current) use of anticoagulants; Z86.39-Personal history of other endocrine, nutritional and metabolic disease; Z86.79-Personal hi  COMPARISON: 12/15/2021  FINDINGS: There are mildly increased bilateral interstitial opacities bilaterally, with appearance favoring vascular engorgement and possible minimal interstitial edema over pneumonia. There is no effusion or pneumothorax. Unremarkable heart and mediastinal contours.      There are mildly increased bilateral interstitial opacities bilaterally, with appearance favoring vascular engorgement and possible minimal interstitial edema over pneumonia. There is no effusion or pneumothorax. Unremarkable heart and mediastinal contours.  This report was finalized on 12/17/2021 8:31 AM by Doyle Carlisle.      XR Chest 1 View    Result Date: 12/15/2021  EXAMINATION: XR CHEST 1 VW-  INDICATION: Shortness of air.  COMPARISON: None.  FINDINGS: Portable chest reveals cardiac and mediastinal silhouettes within normal limits.  "The lung fields are grossly clear. No focal parenchymal opacification is present.  No pleural effusion or pneumothorax. The bony structures are unremarkable. Pulmonary vascularity is within normal limits.         No acute cardiopulmonary disease.  D:  12/15/2021 E:  12/15/2021  This report was finalized on 12/15/2021 4:53 PM by Dr. Shira Parra MD.      Stress Test With Pet Myocardial Perfusion (MULTI STUDY, REST AND STRESS)    Result Date: 12/17/2021  · Patient denied chest discomfort/pain during stress; did report \"feeling weird\" and short of breath, after the Lexiscan was given; back to baseline during recovery. · Abnormal ECG at baseline (A-Fib); no significant ST or T wave changes identified. · REST EF = 39% STRESS EF = 41%. · There is a largely fixed anteroapical defect on both rest and stress studies noted. There is ethan-infarct ischemia only.                Results for orders placed during the hospital encounter of 12/15/21    Adult Transthoracic Echo Complete W/ Cont if Necessary Per Protocol    Interpretation Summary  · Left ventricular systolic function is normal. Estimated left ventricular EF = 65%.  · Left ventricular wall thickness is consistent with borderline concentric hypertrophy.  · Moderate mitral valve regurgitation is present.  · Moderate tricuspid valve regurgitation is present.  · Estimated right ventricular systolic pressure from tricuspid regurgitation is moderately elevated (45-55 mmHg).      Plan for Follow-up of Pending Labs/Results:     Discharge Details        Discharge Medications      New Medications      Instructions Start Date   diazePAM 2 MG tablet  Commonly known as: Valium   2 mg, Oral, 2 Times Daily PRN      gabapentin 400 MG capsule  Commonly known as: NEURONTIN  Replaces: gabapentin 800 MG tablet   400 mg, Oral, 4 Times Daily      nicotine 21 MG/24HR patch  Commonly known as: NICODERM CQ   1 patch, Transdermal, Every 24 Hours Scheduled   Start Date: December 21, 2021   "      Changes to Medications      Instructions Start Date   levothyroxine 150 MCG tablet  Commonly known as: SYNTHROID, LEVOTHROID  What changed:   medication strength  how much to take   150 mcg, Oral, Every Early Morning   Start Date: December 21, 2021     rivaroxaban 15 MG tablet  Commonly known as: XARELTO  What changed:   medication strength  how much to take  when to take this   15 mg, Oral, 2 Times Daily With Meals      rivaroxaban 20 MG tablet  Commonly known as: XARELTO  What changed: You were already taking a medication with the same name, and this prescription was added. Make sure you understand how and when to take each.   20 mg, Oral, Daily With Dinner   Start Date: January 5, 2022        Continue These Medications      Instructions Start Date   albuterol (2.5 MG/3ML) 0.083% nebulizer solution  Commonly known as: PROVENTIL   2.5 mg, Nebulization, Every 4 Hours PRN      albuterol sulfate  (90 Base) MCG/ACT inhaler  Commonly known as: PROVENTIL HFA;VENTOLIN HFA;PROAIR HFA   2 puffs, Inhalation, Every 4 Hours PRN      aspirin 81 MG EC tablet   81 mg, Oral, Daily      atorvastatin 10 MG tablet  Commonly known as: LIPITOR   10 mg, Oral, Nightly      budesonide-formoterol 160-4.5 MCG/ACT inhaler  Commonly known as: SYMBICORT   2 puffs, Inhalation, 2 Times Daily - RT      bumetanide 1 MG tablet  Commonly known as: BUMEX   1 mg, Oral, 2 Times Daily      cetirizine 10 MG tablet  Commonly known as: zyrTEC   10 mg, Oral, Daily PRN, OTC      glimepiride 2 MG tablet  Commonly known as: AMARYL   2 mg, Oral, Every Morning Before Breakfast      lisinopril 40 MG tablet  Commonly known as: PRINIVIL,ZESTRIL   40 mg, Oral, Daily      metFORMIN  MG 24 hr tablet  Commonly known as: GLUCOPHAGE-XR   1,500 mg, Oral, Daily With Breakfast      metoprolol succinate XL 25 MG 24 hr tablet  Commonly known as: TOPROL-XL   25 mg, Oral, Daily      montelukast 10 MG tablet  Commonly known as: SINGULAIR   10 mg, Oral,  Nightly      Multivitamin Adult tablet tablet   1 tablet, Oral, Daily, OTC      omeprazole 40 MG capsule  Commonly known as: priLOSEC   40 mg, Oral, Daily      spironolactone 50 MG tablet  Commonly known as: ALDACTONE   50 mg, Oral, Daily      Trulicity 1.5 MG/0.5ML solution pen-injector  Generic drug: Dulaglutide   1.5 mg, Subcutaneous, Every 7 Days         Stop These Medications    gabapentin 800 MG tablet  Commonly known as: NEURONTIN  Replaced by: gabapentin 400 MG capsule            No Known Allergies      Discharge Disposition:  Home or Self Care    Diet:  Hospital:  Diet Order   Procedures   • Diet Regular; Cardiac, Consistent Carbohydrate       Activity:      Restrictions or Other Recommendations:         CODE STATUS:    Code Status and Medical Interventions:   Ordered at: 12/15/21 0616     Code Status (Patient has no pulse and is not breathing):    CPR (Attempt to Resuscitate)     Medical Interventions (Patient has pulse or is breathing):    Full Support       No future appointments.    Additional Instructions for the Follow-ups that You Need to Schedule     Discharge Follow-up with PCP   As directed       Currently Documented PCP:    Felix Milligan MD    PCP Phone Number:    [phone]     Follow Up Details: in one week with PCP                     Robyn Dasilva MD  12/20/21      Time Spent on Discharge:  I spent  35  minutes on this discharge activity which included: face-to-face encounter with the patient, reviewing the data in the system, coordination of the care with the nursing staff as well as consultants, documentation, and entering orders.

## 2021-12-20 NOTE — CASE MANAGEMENT/SOCIAL WORK
Continued Stay Note  [facility]     Patient Name: Osito Person  MRN: [id number]  Today's Date: 12/20/2021    Admit Date: 12/15/2021     Discharge Plan     Row Name 12/20/21 1018       Plan    Plan [facility] today    Plan Comments Upon d/c, pt is to report to the [facility] in [place], ok to d/c from an addiction perspective.    1055- just spoke with the patient's son, Troy to review [facility] treatment plans.  Son is in agreement with the plan and will ensure that his father makes it to the center today.               Discharge Codes    No documentation.                     Rylie Langston, RN ,BSN   Addiction Coordinator

## 2021-12-20 NOTE — NURSING NOTE
Pt. Referred for Phase II Cardiac Rehab. Staff discussed benefits of exercise, program protocol, and educational material provided. Teach back verified.  Permission granted from patient for staff to fax referral information to outlying program at this time.  Staff faxed referral info to [facility].

## 2021-12-20 NOTE — CONSULTS
" Discussed and taught patient about type 2 diabetes self-management, risk factors, and importance of blood glucose control to reduce complications. Target blood glucose readings and A1c goals per ADA were reviewed. Reviewed with patient current A1c 7.3 and discussed its significance. Signs, symptoms, and treatment of hyperglycemia and hypoglycemia were discussed. Lifestyle changes such as physical activity with MD approval and healthy eating were encouraged. Stressed the importance of strict blood sugar control after surgery to prevent complications such as infection and to promote healing of incision. Encouraged pt to monitor blood sugar at home 1+  times per day and to call PCP if blood sugar is trending yomi. Encouraged to keep record of blood glucose readings to take to follow up appointment with PCP. Provided patient with copy of [employer]'s \"What is Diabetes\" handout, \"Blood Glucose Goals\" handout, and \"What is A1c\" handout. Thank you for this referral.   "

## 2021-12-21 ENCOUNTER — READMISSION MANAGEMENT (OUTPATIENT)
Dept: CALL CENTER | Facility: HOSPITAL | Age: 66
End: 2021-12-21

## 2021-12-21 NOTE — OUTREACH NOTE
Prep Survey      Responses   [facility] patient discharged from? [facility]   Is LACE score < 7 ? No   Emergency Room discharge w/ pulse ox? No   Eligibility Not Eligible   What are the reasons patient is not eligible? Other   Does the patient have one of the following disease processes/diagnoses(primary or secondary)? Other   Prep survey completed? Yes          Elaine Desai RN

## 2023-03-11 ENCOUNTER — APPOINTMENT (OUTPATIENT)
Dept: CT IMAGING | Age: 68
End: 2023-03-11
Payer: MEDICARE

## 2023-03-11 ENCOUNTER — APPOINTMENT (OUTPATIENT)
Dept: GENERAL RADIOLOGY | Age: 68
End: 2023-03-11
Payer: MEDICARE

## 2023-03-11 ENCOUNTER — HOSPITAL ENCOUNTER (EMERGENCY)
Age: 68
Discharge: HOME | End: 2023-03-11
Attending: EMERGENCY MEDICINE
Payer: MEDICARE

## 2023-03-11 VITALS
RESPIRATION RATE: 18 BRPM | WEIGHT: 211.3 LBS | TEMPERATURE: 98.3 F | DIASTOLIC BLOOD PRESSURE: 86 MMHG | HEIGHT: 68 IN | OXYGEN SATURATION: 91 % | HEART RATE: 77 BPM | SYSTOLIC BLOOD PRESSURE: 177 MMHG | BODY MASS INDEX: 32.02 KG/M2

## 2023-03-11 DIAGNOSIS — S51.012A SKIN TEAR OF LEFT ELBOW WITHOUT COMPLICATION, INITIAL ENCOUNTER: ICD-10-CM
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
## 2023-03-11 LAB
A/G RATIO: 1.3 (ref 1.1–2.2)
ALBUMIN SERPL-MCNC: 4.1 G/DL (ref 3.4–5)
ALP BLD-CCNC: 110 U/L (ref 40–129)
ALT SERPL-CCNC: 326 U/L (ref 10–40)
ANION GAP SERPL CALCULATED.3IONS-SCNC: 10 MMOL/L (ref 3–16)
APTT: 38.9 SEC (ref 23–34.3)
AST SERPL-CCNC: 115 U/L (ref 15–37)
BACTERIA: ABNORMAL /HPF
BASE EXCESS VENOUS: 6.9 MMOL/L (ref -2–3)
BASOPHILS ABSOLUTE: 0.1 K/UL (ref 0–0.2)
BASOPHILS RELATIVE PERCENT: 0.5 %
BILIRUB SERPL-MCNC: 0.5 MG/DL (ref 0–1)
BILIRUBIN URINE: NEGATIVE
BLOOD, URINE: ABNORMAL
BUN BLDV-MCNC: 43 MG/DL (ref 7–20)
CALCIUM SERPL-MCNC: 9.2 MG/DL (ref 8.3–10.6)
CARBOXYHEMOGLOBIN: 1.3 % (ref 0–1.5)
CHLORIDE BLD-SCNC: 99 MMOL/L (ref 99–110)
CLARITY: CLEAR
CO2: 31 MMOL/L (ref 21–32)
COLOR: YELLOW
CREAT SERPL-MCNC: 1.5 MG/DL (ref 0.8–1.3)
EKG ATRIAL RATE: 97 BPM
EKG DIAGNOSIS: NORMAL
EKG Q-T INTERVAL: 408 MS
EKG QRS DURATION: 102 MS
EKG QTC CALCULATION (BAZETT): 433 MS
EKG R AXIS: 61 DEGREES
EKG T AXIS: 61 DEGREES
EKG VENTRICULAR RATE: 68 BPM
EOSINOPHILS ABSOLUTE: 0.1 K/UL (ref 0–0.6)
EOSINOPHILS RELATIVE PERCENT: 0.7 %
EPITHELIAL CELLS, UA: ABNORMAL /HPF (ref 0–5)
ETHANOL: NORMAL MG/DL (ref 0–0.08)
GFR SERPL CREATININE-BSD FRML MDRD: 51 ML/MIN/{1.73_M2}
GLUCOSE BLD-MCNC: 129 MG/DL (ref 70–99)
GLUCOSE URINE: NEGATIVE MG/DL
HCO3 VENOUS: 34.5 MMOL/L (ref 24–28)
HCT VFR BLD CALC: 28.8 % (ref 40.5–52.5)
HEMOGLOBIN, VEN, REDUCED: 62.7 %
HEMOGLOBIN: 9.3 G/DL (ref 13.5–17.5)
INR BLD: 1.74 (ref 0.87–1.14)
KETONES, URINE: NEGATIVE MG/DL
LEUKOCYTE ESTERASE, URINE: ABNORMAL
LYMPHOCYTES ABSOLUTE: 0.9 K/UL (ref 1–5.1)
LYMPHOCYTES RELATIVE PERCENT: 8.2 %
MCH RBC QN AUTO: 27.6 PG (ref 26–34)
MCHC RBC AUTO-ENTMCNC: 32.3 G/DL (ref 31–36)
MCV RBC AUTO: 85.4 FL (ref 80–100)
METHEMOGLOBIN VENOUS: 0.5 % (ref 0–1.5)
MICROSCOPIC EXAMINATION: YES
MONOCYTES ABSOLUTE: 0.4 K/UL (ref 0–1.3)
MONOCYTES RELATIVE PERCENT: 4.1 %
NEUTROPHILS ABSOLUTE: 9.1 K/UL (ref 1.7–7.7)
NEUTROPHILS RELATIVE PERCENT: 86.5 %
NITRITE, URINE: NEGATIVE
O2 SAT, VEN: 36 %
PCO2, VEN: 66.1 MMHG (ref 41–51)
PDW BLD-RTO: 16.7 % (ref 12.4–15.4)
PH UA: 6 (ref 5–8)
PH VENOUS: 7.33 (ref 7.35–7.45)
PLATELET # BLD: 240 K/UL (ref 135–450)
PMV BLD AUTO: 8.7 FL (ref 5–10.5)
PO2, VEN: <30 MMHG (ref 25–40)
POTASSIUM REFLEX MAGNESIUM: 4.5 MMOL/L (ref 3.5–5.1)
PRO-BNP: 9297 PG/ML (ref 0–124)
PROTEIN UA: 30 MG/DL
PROTHROMBIN TIME: 20.4 SEC (ref 11.7–14.5)
RBC # BLD: 3.37 M/UL (ref 4.2–5.9)
RBC UA: ABNORMAL /HPF (ref 0–4)
SODIUM BLD-SCNC: 140 MMOL/L (ref 136–145)
SPECIFIC GRAVITY UA: 1.02 (ref 1–1.03)
T4 FREE: 2.8 NG/DL (ref 0.9–1.8)
TCO2 CALC VENOUS: 37 MMOL/L
TOTAL PROTEIN: 7.3 G/DL (ref 6.4–8.2)
TROPONIN: 0.05 NG/ML
TROPONIN: 0.05 NG/ML
TSH REFLEX: 0.04 UIU/ML (ref 0.27–4.2)
URINE REFLEX TO CULTURE: ABNORMAL
URINE TYPE: ABNORMAL
UROBILINOGEN, URINE: 0.2 E.U./DL
WBC # BLD: 10.5 K/UL (ref 4–11)
WBC UA: ABNORMAL /HPF (ref 0–5)

## 2023-03-11 PROCEDURE — 93005 ELECTROCARDIOGRAM TRACING: CPT | Performed by: STUDENT IN AN ORGANIZED HEALTH CARE EDUCATION/TRAINING PROGRAM

## 2023-03-11 PROCEDURE — 82077 ASSAY SPEC XCP UR&BREATH IA: CPT

## 2023-03-11 PROCEDURE — 84439 ASSAY OF FREE THYROXINE: CPT

## 2023-03-11 PROCEDURE — 84484 ASSAY OF TROPONIN QUANT: CPT

## 2023-03-11 PROCEDURE — 85730 THROMBOPLASTIN TIME PARTIAL: CPT

## 2023-03-11 PROCEDURE — 73552 X-RAY EXAM OF FEMUR 2/>: CPT

## 2023-03-11 PROCEDURE — 81001 URINALYSIS AUTO W/SCOPE: CPT

## 2023-03-11 PROCEDURE — 83880 ASSAY OF NATRIURETIC PEPTIDE: CPT

## 2023-03-11 PROCEDURE — 87086 URINE CULTURE/COLONY COUNT: CPT

## 2023-03-11 PROCEDURE — 36415 COLL VENOUS BLD VENIPUNCTURE: CPT

## 2023-03-11 PROCEDURE — 73070 X-RAY EXAM OF ELBOW: CPT

## 2023-03-11 PROCEDURE — 85025 COMPLETE CBC W/AUTO DIFF WBC: CPT

## 2023-03-11 PROCEDURE — 72170 X-RAY EXAM OF PELVIS: CPT

## 2023-03-11 PROCEDURE — 6370000000 HC RX 637 (ALT 250 FOR IP): Performed by: STUDENT IN AN ORGANIZED HEALTH CARE EDUCATION/TRAINING PROGRAM

## 2023-03-11 PROCEDURE — 82803 BLOOD GASES ANY COMBINATION: CPT

## 2023-03-11 PROCEDURE — 85610 PROTHROMBIN TIME: CPT

## 2023-03-11 PROCEDURE — 71045 X-RAY EXAM CHEST 1 VIEW: CPT

## 2023-03-11 PROCEDURE — 87186 SC STD MICRODIL/AGAR DIL: CPT

## 2023-03-11 PROCEDURE — 72125 CT NECK SPINE W/O DYE: CPT

## 2023-03-11 PROCEDURE — 87077 CULTURE AEROBIC IDENTIFY: CPT

## 2023-03-11 PROCEDURE — 99285 EMERGENCY DEPT VISIT HI MDM: CPT

## 2023-03-11 PROCEDURE — 80053 COMPREHEN METABOLIC PANEL: CPT

## 2023-03-11 PROCEDURE — 84443 ASSAY THYROID STIM HORMONE: CPT

## 2023-03-11 PROCEDURE — 70450 CT HEAD/BRAIN W/O DYE: CPT

## 2023-03-11 PROCEDURE — 70486 CT MAXILLOFACIAL W/O DYE: CPT

## 2023-03-11 RX ORDER — HYDROXYZINE HYDROCHLORIDE 25 MG/1
25 TABLET, FILM COATED ORAL 3 TIMES DAILY PRN
COMMUNITY

## 2023-03-11 RX ORDER — AZITHROMYCIN 250 MG/1
250 TABLET, FILM COATED ORAL DAILY
COMMUNITY

## 2023-03-11 RX ORDER — FUROSEMIDE 40 MG/1
40 TABLET ORAL 2 TIMES DAILY
COMMUNITY

## 2023-03-11 RX ORDER — ACETAMINOPHEN 500 MG
1000 TABLET ORAL
Status: COMPLETED | OUTPATIENT
Start: 2023-03-11 | End: 2023-03-11

## 2023-03-11 RX ORDER — ALBUTEROL SULFATE 90 UG/1
2 AEROSOL, METERED RESPIRATORY (INHALATION) EVERY 6 HOURS PRN
COMMUNITY

## 2023-03-11 RX ORDER — BACITRACIN ZINC AND POLYMYXIN B SULFATE 500; 1000 [USP'U]/G; [USP'U]/G
OINTMENT TOPICAL ONCE
Status: DISCONTINUED | OUTPATIENT
Start: 2023-03-11 | End: 2023-03-11 | Stop reason: HOSPADM

## 2023-03-11 RX ORDER — OMEPRAZOLE 40 MG/1
40 CAPSULE, DELAYED RELEASE ORAL DAILY
COMMUNITY

## 2023-03-11 RX ORDER — PREDNISONE 10 MG/1
10 TABLET ORAL DAILY
COMMUNITY

## 2023-03-11 RX ORDER — ASPIRIN 81 MG/1
81 TABLET, CHEWABLE ORAL DAILY
COMMUNITY

## 2023-03-11 RX ORDER — IPRATROPIUM BROMIDE AND ALBUTEROL SULFATE 2.5; .5 MG/3ML; MG/3ML
1 SOLUTION RESPIRATORY (INHALATION) EVERY 4 HOURS
COMMUNITY

## 2023-03-11 RX ORDER — NALOXONE HYDROCHLORIDE 4 MG/.1ML
1 SPRAY NASAL PRN
COMMUNITY

## 2023-03-11 RX ORDER — LEVOTHYROXINE SODIUM 0.05 MG/1
50 TABLET ORAL DAILY
COMMUNITY

## 2023-03-11 RX ORDER — FLUTICASONE PROPIONATE AND SALMETEROL 500; 50 UG/1; UG/1
1 POWDER RESPIRATORY (INHALATION) EVERY 12 HOURS
COMMUNITY

## 2023-03-11 RX ORDER — LANOLIN ALCOHOL/MO/W.PET/CERES
3 CREAM (GRAM) TOPICAL DAILY
COMMUNITY

## 2023-03-11 RX ORDER — ACETAMINOPHEN 500 MG
500 TABLET ORAL EVERY 6 HOURS PRN
COMMUNITY

## 2023-03-11 RX ORDER — FERROUS SULFATE 325(65) MG
325 TABLET ORAL
COMMUNITY

## 2023-03-11 RX ORDER — TRAZODONE HYDROCHLORIDE 50 MG/1
50 TABLET ORAL NIGHTLY
COMMUNITY

## 2023-03-11 RX ORDER — GABAPENTIN 100 MG/1
100 CAPSULE ORAL 3 TIMES DAILY
COMMUNITY

## 2023-03-11 RX ORDER — DULOXETINE 40 MG/1
CAPSULE, DELAYED RELEASE ORAL
COMMUNITY

## 2023-03-11 RX ORDER — ATORVASTATIN CALCIUM 40 MG/1
40 TABLET, FILM COATED ORAL DAILY
COMMUNITY

## 2023-03-11 RX ORDER — NIFEDIPINE 30 MG/1
30 TABLET, EXTENDED RELEASE ORAL DAILY
COMMUNITY

## 2023-03-11 RX ORDER — BUPRENORPHINE AND NALOXONE 4; 1 MG/1; MG/1
1 FILM, SOLUBLE BUCCAL; SUBLINGUAL DAILY
COMMUNITY

## 2023-03-11 RX ADMIN — ACETAMINOPHEN 1000 MG: 500 TABLET ORAL at 02:54

## 2023-03-11 RX ADMIN — IBUPROFEN 600 MG: 400 TABLET ORAL at 02:54

## 2023-03-11 ASSESSMENT — PAIN DESCRIPTION - FREQUENCY: FREQUENCY: CONTINUOUS

## 2023-03-11 ASSESSMENT — PAIN DESCRIPTION - ORIENTATION: ORIENTATION: LEFT

## 2023-03-11 ASSESSMENT — PAIN - FUNCTIONAL ASSESSMENT: PAIN_FUNCTIONAL_ASSESSMENT: 0-10

## 2023-03-11 ASSESSMENT — LIFESTYLE VARIABLES
HOW MANY STANDARD DRINKS CONTAINING ALCOHOL DO YOU HAVE ON A TYPICAL DAY: PATIENT DOES NOT DRINK
HOW OFTEN DO YOU HAVE A DRINK CONTAINING ALCOHOL: NEVER

## 2023-03-11 ASSESSMENT — PAIN DESCRIPTION - DESCRIPTORS: DESCRIPTORS: ACHING

## 2023-03-11 ASSESSMENT — PAIN DESCRIPTION - PAIN TYPE: TYPE: ACUTE PAIN

## 2023-03-11 ASSESSMENT — PAIN SCALES - GENERAL: PAINLEVEL_OUTOF10: 8

## 2023-03-11 ASSESSMENT — PAIN DESCRIPTION - LOCATION: LOCATION: HEAD

## 2023-03-11 NOTE — ED PROVIDER NOTES
[id number] [facility] RESIDENT NOTE       Date of evaluation: 3/11/2023    Chief Complaint     Fall (Pt reports fall at nursing home, states he tripped while walking. Denies loss of consciousness. +blood thinners, +hit head. Abrasion left eye and left elbow )    History of Present Illness     Daniel De Luna is a pleasant 74 y.o. male with history of CKD stage IV, atrial fibrillation on Eliquis, COPD baseline 3 L O2, CAD, PVD, hypertension, and substance use disorder who presents with fall. Patient presents the emergency department from rehab facility with an unwitnessed reportedly mechanical fall. Patient states that he tripped over his left foot and fell forward landing on his left arm and forehead. He denies any preceding lightheadedness, chest pain, or difficulty breathing. Patient takes Eliquis for his atrial fibrillation. Patient has a vague recollection of events however. He states that he is only been there for several days which is verified on documentation brought with the patient, however later states that has been there for several months which is incorrect. He states that since being at the facility he is required 3 L of oxygen for his COPD, cannot state how long this has been necessary. On chart review, patient typically receives care at [facility] and was evaluated for spontaneous IVH at one point requiring an intraperitoneal shunt. Patient reports pain in bilateral upper legs, his left elbow, and over his face. The patient denies any aggravating or alleviating factors or associated symptoms other than discussed above. Review of Systems     A comprehensive review of systems was performed and negative except as noted previously in the HPI. Past Medical, Surgical, Family, and Social History     He has no past medical history on file. He has no past surgical history on file. His family history is not on file.   He reports that he has quit smoking. His smoking use included cigarettes. He has never used smokeless tobacco. He reports that he does not currently use alcohol. He reports that he does not currently use drugs. Medications     Discharge Medication List as of 3/11/2023  4:22 AM        CONTINUE these medications which have NOT CHANGED    Details   acetaminophen (TYLENOL) 500 MG tablet Take 500 mg by mouth every 6 hours as needed for PainHistorical Med      albuterol sulfate HFA (VENTOLIN HFA) 108 (90 Base) MCG/ACT inhaler Inhale 2 puffs into the lungs every 6 hours as needed for WheezingHistorical Med      apixaban (ELIQUIS) 5 MG TABS tablet Take by mouth 2 times dailyHistorical Med      aspirin 81 MG chewable tablet Take 81 mg by mouth dailyHistorical Med      atorvastatin (LIPITOR) 40 MG tablet Take 40 mg by mouth dailyHistorical Med      azithromycin (ZITHROMAX) 250 MG tablet Take 250 mg by mouth dailyHistorical Med      buprenorphine-naloxone (SUBOXONE) 4-1 MG FILM SL film Place 1 Film under the tongue daily. Historical Med      DULoxetine HCl 40 MG CPEP Take by mouthHistorical Med      ferrous sulfate (IRON 325) 325 (65 Fe) MG tablet Take 325 mg by mouth daily (with breakfast)Historical Med      Fluticasone Furoate 50 MCG/ACT AEPB Inhale into the lungsHistorical Med      furosemide (LASIX) 40 MG tablet Take 40 mg by mouth in the morning and 40 mg in the evening. Historical Med      gabapentin (NEURONTIN) 100 MG capsule Take 100 mg by mouth 3 times daily. Historical Med      hydrOXYzine HCl (ATARAX) 25 MG tablet Take 25 mg by mouth 3 times daily as needed for ItchingHistorical Med      ipratropium-albuterol (DUONEB) 0.5-2.5 (3) MG/3ML SOLN nebulizer solution Inhale 1 vial into the lungs every 4 hoursHistorical Med      levothyroxine (SYNTHROID) 50 MCG tablet Take 50 mcg by mouth DailyHistorical Med      melatonin 3 MG TABS tablet Take 3 mg by mouth dailyHistorical Med      metoprolol tartrate (LOPRESSOR) 25 MG tablet Take 25 mg by mouth 2 times dailyHistorical Med      naloxone 4 MG/0.1ML LIQD nasal spray 1 spray by Nasal route as needed for Opioid ReversalHistorical Med      nicotine (NICOTINE STEP 3) 7 MG/24HR Place 1 patch onto the skin every 24 hoursHistorical Med      NIFEdipine (PROCARDIA XL) 30 MG extended release tablet Take 30 mg by mouth dailyHistorical Med      omeprazole (PRILOSEC) 40 MG delayed release capsule Take 40 mg by mouth dailyHistorical Med      predniSONE (DELTASONE) 10 MG tablet Take 10 mg by mouth dailyHistorical Med      tiotropium (SPIRIVA) 18 MCG inhalation capsule Inhale 18 mcg into the lungs dailyHistorical Med      traZODone (DESYREL) 50 MG tablet Take 50 mg by mouth nightlyHistorical Med      fluticasone-salmeterol (WIXELA INHUB) 500-50 MCG/ACT AEPB diskus inhaler Inhale 1 puff into the lungs every 12 hoursHistorical Med           Allergies     He has No Known Allergies. Physical Exam     INITIAL VITALS: BP: (!) 143/75, Temp: 98.3 °F (36.8 °C), Heart Rate: 76, Resp: 16, SpO2: 92 %     General: Overall chronically ill-appearing. Head: Normocephalic. Bruising with small hematoma over the left eye. Eyes: Anicteric. Pupils reactive. Extraocular movement intact. No discharge from eyes. ENT: External ears normal. No discharge from nose, OP clear. Neck: Supple, trachea midline. Pulmonary: Non-labored breathing. Breath sounds clear bilaterally. Cardiac: Regular rate. Irregular rhythm. No murmurs. Abdomen: Soft. Non-distended. Non-tender. Musculoskeletal: No long bone deformity. Pelvis stable. Tenderness to palpation over the bilateral mid femurs. Flexion extension at the knees intact. No pain with logroll bilaterally. 2+ dorsalis pedis pulse bilaterally. Able to wiggle all toes. Superficial skin tear to left elbow, hemostatic. Extension flexion intact at the left elbow. Extension and flexion intact at the left wrist.  2+ radial pulse.   Sensation intact to light touch in all nerve distributions. Skin: Dry, no rashes. Extremities: Warm and well perfused. No peripheral edema. Neuro: A&O x3. Moves all four extremities to command. Sensation grossly intact to light touch. No focal deficit. Psych: Mood and affect appropriate for situation. DiagnosticResults     EKG   Indication: Fall    Rate: 68 bpm  Rhythm: Atrial fibrillation  Axis: Normal  Intervals:  QT/QTc 408/433  Ischemia: None  Ectopy: None  Other: None  Comparison: None available    Clinical Impression: Atrial fibrillation with regular rate, no evidence of ischemia    Interpreted in conjunction with emergencydepartment physician Lorelei Srinivasan MD    RADIOLOGY:  XR ELBOW LEFT (2 VIEWS)   Final Result   1. No evidence of acutely displaced fracture or dislocation    within the left elbow. XR FEMUR LEFT (MIN 2 VIEWS)   Final Result     Normal left femur x-rays. XR FEMUR RIGHT (MIN 2 VIEWS)   Final Result     Patient status post right intramedullary nail with proximal    dynamic hip screw and distal interlocking screw. No evidence of    acutely displaced fracture or dislocation within the right femur. XR PELVIS (1-2 VIEWS)   Final Result   1. Limited evaluation given bone demineralization. In addition,    the sacrum is poorly visualized due to overlying bowel gas. 2.  No evidence of acutely displaced fracture or dislocation    within the pelvis. CT CERVICAL SPINE WO CONTRAST   Final Result      No acute fracture of cervical spine. CT MAXILLOFACIAL WO CONTRAST   Final Result      No acute fracture of the visualized maxillofacial bones. CT HEAD WO CONTRAST   Final Result      No acute intracranial abnormality. XR CHEST PORTABLE   Final Result      Small bilateral pleural effusion with bibasilar airspace disease/atelectasis.               LABS:   Results for orders placed or performed during the hospital encounter of 03/11/23   CBC with Auto Differential   Result Value Ref Range    WBC 10.5 4.0 - 11.0 K/uL    RBC 3.37 (L) 4.20 - 5.90 M/uL    Hemoglobin 9.3 (L) 13.5 - 17.5 g/dL    Hematocrit 28.8 (L) 40.5 - 52.5 %    MCV 85.4 80.0 - 100.0 fL    MCH 27.6 26.0 - 34.0 pg    MCHC 32.3 31.0 - 36.0 g/dL    RDW 16.7 (H) 12.4 - 15.4 %    Platelets 876 262 - 311 K/uL    MPV 8.7 5.0 - 10.5 fL    Neutrophils % 86.5 %    Lymphocytes % 8.2 %    Monocytes % 4.1 %    Eosinophils % 0.7 %    Basophils % 0.5 %    Neutrophils Absolute 9.1 (H) 1.7 - 7.7 K/uL    Lymphocytes Absolute 0.9 (L) 1.0 - 5.1 K/uL    Monocytes Absolute 0.4 0.0 - 1.3 K/uL    Eosinophils Absolute 0.1 0.0 - 0.6 K/uL    Basophils Absolute 0.1 0.0 - 0.2 K/uL   CMP w/ Reflex to MG   Result Value Ref Range    Sodium 140 136 - 145 mmol/L    Potassium reflex Magnesium 4.5 3.5 - 5.1 mmol/L    Chloride 99 99 - 110 mmol/L    CO2 31 21 - 32 mmol/L    Anion Gap 10 3 - 16    Glucose 129 (H) 70 - 99 mg/dL    BUN 43 (H) 7 - 20 mg/dL    Creatinine 1.5 (H) 0.8 - 1.3 mg/dL    Est, Glom Filt Rate 51 (A) >60    Calcium 9.2 8.3 - 10.6 mg/dL    Total Protein 7.3 6.4 - 8.2 g/dL    Albumin 4.1 3.4 - 5.0 g/dL    Albumin/Globulin Ratio 1.3 1.1 - 2.2    Total Bilirubin 0.5 0.0 - 1.0 mg/dL    Alkaline Phosphatase 110 40 - 129 U/L     (H) 10 - 40 U/L     (H) 15 - 37 U/L   Troponin   Result Value Ref Range    Troponin 0.05 (H) <0.01 ng/mL   BNP   Result Value Ref Range    Pro-BNP 9,297 (H) 0 - 124 pg/mL   Protime-INR   Result Value Ref Range    Protime 20.4 (H) 11.7 - 14.5 sec    INR 1.74 (H) 0.87 - 1.14   APTT   Result Value Ref Range    aPTT 38.9 (H) 23.0 - 34.3 sec   Urinalysis with Reflex to Culture    Specimen: Urine   Result Value Ref Range    Color, UA Yellow Straw/Yellow    Clarity, UA Clear Clear    Glucose, Ur Negative Negative mg/dL    Bilirubin Urine Negative Negative    Ketones, Urine Negative Negative mg/dL    Specific Gravity, UA 1.020 1.005 - 1.030    Blood, Urine TRACE-INTACT (A) Negative    pH, UA 6.0 5.0 - 8.0    Protein, UA 30 (A) Negative mg/dL    Urobilinogen, Urine 0.2 <2.0 E.U./dL    Nitrite, Urine Negative Negative    Leukocyte Esterase, Urine SMALL (A) Negative    Microscopic Examination YES     Urine Type Voided     Urine Reflex to Culture Not Indicated    TSH with Reflex   Result Value Ref Range    TSH 0.04 (L) 0.27 - 4.20 uIU/mL   T4, Free   Result Value Ref Range    T4 Free 2.8 (H) 0.9 - 1.8 ng/dL   Blood Gas, Venous   Result Value Ref Range    pH, Trace 7.325 (L) 7.350 - 7.450    pCO2, Trace 66.1 (H) 41.0 - 51.0 mmHg    pO2, Trace <30.0 25.0 - 40.0 mmHg    HCO3, Venous 34.5 (H) 24.0 - 28.0 mmol/L    Base Excess, Trace 6.9 (H) -2.0 - 3.0 mmol/L    O2 Sat, Trace 36 Not established %    Carboxyhemoglobin 1.3 0.0 - 1.5 %    MetHgb, Trace 0.5 0.0 - 1.5 %    TC02 (Calc), Trace 37 mmol/L    Hemoglobin, Trace, Reduced 62.70 %   ETOH   Result Value Ref Range    Ethanol Lvl None Detected mg/dL   Microscopic Urinalysis   Result Value Ref Range    WBC, UA 6-9 (A) 0 - 5 /HPF    RBC, UA 0-2 0 - 4 /HPF    Epithelial Cells, UA 2-5 0 - 5 /HPF    Bacteria, UA Rare (A) None Seen /HPF   Troponin   Result Value Ref Range    Troponin 0.05 (H) <0.01 ng/mL   EKG 12 Lead   Result Value Ref Range    Ventricular Rate 68 BPM    Atrial Rate 97 BPM    QRS Duration 102 ms    Q-T Interval 408 ms    QTc Calculation (Bazett) 433 ms    R Axis 61 degrees    T Axis 61 degrees    Diagnosis       EKG performed in ER and to be interpreted by ER physician. Confirmed by MD, ER ([id number]),  [address], [address] [place] [address] ([facility]) on 3/11/2023 7:05:23 AM       ED BEDSIDE ULTRASOUND:  No bedside ultrasound performed. RECENT VITALS:  BP: (!) 177/86, Temp: 98.3 °F (36.8 °C), Heart Rate: 77,Resp: 18, SpO2: 91 %     Procedures     No ED procedures performed. ED Course     Nursing Notes, Past Medical Hx, Past Surgical Hx, Social Hx, Allergies, and Family Hx were reviewed.     The patient was given the followingmedications:  Orders Placed This Encounter   Medications    acetaminophen (TYLENOL) tablet 1,000 mg ibuprofen (ADVIL;MOTRIN) tablet 600 mg    DISCONTD: bacitracin-polymyxin b (POLYSPORIN) ointment       CONSULTS:  None    MEDICAL DECISION MAKING / ASSESSMENT / Melia Lul is a 74 y.o. male with a history and presentation as described above in HPI. Appropriate labs and diagnostic studies were reviewed as they were made available. Pertinent laboratory studies in medical decision making are listed below. Upon presentation, the patient was chronically ill-appearing, afebrile, and hemodynamically stable. Patient presents emergency department after what is reported to be a mechanical fall at his rehab facility though this was unwitnessed. Patient is somewhat slightly confused on exam and it is unclear if this is his baseline. He is hypoxic on room air but per the patient and review of his chart is on 3 L of oxygen baseline due to COPD. Fortunately, he has no significant respiratory distress and his lungs are clear on exam today. He is on blood thinners for his baseline atrial fibrillation and so head imaging was obtained including a max face for trauma to the left orbit as well as a cervical spinal neck which was all negative for any acute fracture or hemorrhage. Given the patient's confusion and unclear baseline, laboratory work-up was obtained to evaluate for any medical etiologies of the patient's episode today. His EKG demonstrated atrial fibrillation consistent with his known baseline although there is no prior EKG for comparison. He did have an elevated initial troponin at 0.05 which is stable on repeat, on review of the patient's chart he is documented to have [facility] Similarly, has an elevated proBNP of 9000 which is unchanged from his baseline. VBG with mild respiratory acidosis consistent with the patient's known COPD. Chest x-ray with any acute cardiopulmonary abnormality. No leukocytosis and baseline mild anemia. He does have elevated liver function test which is consistent with his prior. Urinalysis not consistent with UTI. He received wound care from nursing and Tylenol and ibuprofen for his pain after his head CT resulted negative with moderate improvement in symptoms. X-rays of the painful femur and left elbow identified on tertiary exam were obtained and similarly negative against any acute fracture or dislocation. Given his reassuring laboratory and radiographic work-up as detailed above, feel the patient is safe to return to his rehabilitation facility. This patient was also evaluated by the attending physician, Dr. Russel Romero. All care plans were discussed and agreed upon. Clinical Impression     1. Fall, initial encounter    2. Skin tear of left elbow without complication, initial encounter        Disposition     PATIENT REFERRED TO:  John Ty  [address] [place] [address] [phone]    In 3 days      DISCHARGE MEDICATIONS:  Discharge Medication List as of 3/11/2023  4:22 AM          DISPOSITION Decision To Discharge 03/11/2023 04:14:53 AM    At this time, the patient was deemed appropriate for discharge. My customary discharge instructions, including strict return precautions for new or worsening symptoms concerning to the patient, were provided. All of patient's questions were answered satisfactorily, and she was subsequently sent home in stable condition. Miguel Almonte MD  PGY-2 Emergency [facility]    This note was dictated using voice-recognition software, which occasionally leads to inadvertent typographic errors.      Tana King MD  Resident  03/11/23 4079

## 2023-03-11 NOTE — ED PROVIDER NOTES
ED Attending Attestation Note     Date of evaluation: 3/11/2023    This patient was seen by the resident. I have seen and examined the patient, agree with the workup, evaluation, management and diagnosis. The care plan has been discussed. I have reviewed the ECG and concur with the resident's interpretation. My assessment reveals adult male who presents after a fall. His work-up is largely unremarkable, with stable elevated troponin, which appears consistent with his previous values. BNP noted to be previously elevated without hypoxia here no sign of acute decompensated heart failure. He has a skin tear over his left elbow, otherwise no sign of acute injury. Will discharge back to his facility for further treatment.     IMPRESSION  1) fall  2) skin tear        Latrice Rice MD  03/11/23 2970

## 2023-03-11 NOTE — ED NOTES
Unable to get in contact with nurse at [facility], RN  03/11/23 7619

## 2023-03-12 LAB
ORGANISM: ABNORMAL
URINE CULTURE, ROUTINE: ABNORMAL

## 2023-03-13 LAB
ORGANISM: ABNORMAL
URINE CULTURE, ROUTINE: ABNORMAL

## 2023-04-01 ENCOUNTER — APPOINTMENT (OUTPATIENT)
Dept: GENERAL RADIOLOGY | Age: 68
DRG: 280 | End: 2023-04-01
Payer: MEDICARE

## 2023-04-01 ENCOUNTER — APPOINTMENT (OUTPATIENT)
Dept: CT IMAGING | Age: 68
DRG: 280 | End: 2023-04-01
Payer: MEDICARE

## 2023-04-01 ENCOUNTER — HOSPITAL ENCOUNTER (INPATIENT)
Age: 68
LOS: 3 days | Discharge: NURSING, REHAB OR LONG-TERM CARE | DRG: 280 | End: 2023-04-04
Attending: EMERGENCY MEDICINE | Admitting: INTERNAL MEDICINE
Payer: MEDICARE

## 2023-04-01 DIAGNOSIS — E87.5 HYPERKALEMIA: Primary | ICD-10-CM

## 2023-04-01 LAB
ALBUMIN SERPL-MCNC: 3.4 G/DL (ref 3.4–5)
ALBUMIN SERPL-MCNC: 3.5 G/DL (ref 3.4–5)
ANION GAP SERPL CALCULATED.3IONS-SCNC: 10 MMOL/L (ref 3–16)
ANION GAP SERPL CALCULATED.3IONS-SCNC: 12 MMOL/L (ref 3–16)
BASE EXCESS BLDV CALC-SCNC: -1 MMOL/L (ref -2–3)
BASOPHILS # BLD: 0 K/UL (ref 0–0.2)
BASOPHILS NFR BLD: 0.4 %
BILIRUB UR QL STRIP.AUTO: NEGATIVE
BUN SERPL-MCNC: 40 MG/DL (ref 7–20)
BUN SERPL-MCNC: 43 MG/DL (ref 7–20)
CALCIUM SERPL-MCNC: 8.9 MG/DL (ref 8.3–10.6)
CALCIUM SERPL-MCNC: 9.3 MG/DL (ref 8.3–10.6)
CHLORIDE SERPL-SCNC: 99 MMOL/L (ref 99–110)
CHLORIDE SERPL-SCNC: 99 MMOL/L (ref 99–110)
CLARITY UR: CLEAR
CO2 BLDV-SCNC: 29 MMOL/L
CO2 SERPL-SCNC: 24 MMOL/L (ref 21–32)
CO2 SERPL-SCNC: 26 MMOL/L (ref 21–32)
COHGB MFR BLDV: 1.3 % (ref 0–1.5)
COLOR UR: YELLOW
CREAT SERPL-MCNC: 1.8 MG/DL (ref 0.8–1.3)
CREAT SERPL-MCNC: 2.2 MG/DL (ref 0.8–1.3)
CREAT UR-MCNC: 19 MG/DL (ref 39–259)
DEPRECATED RDW RBC AUTO: 16.4 % (ref 12.4–15.4)
DO-HGB MFR BLDV: 24.6 %
EKG ATRIAL RATE: 56 BPM
EKG DIAGNOSIS: NORMAL
EKG Q-T INTERVAL: 402 MS
EKG QRS DURATION: 90 MS
EKG QTC CALCULATION (BAZETT): 434 MS
EKG R AXIS: 75 DEGREES
EKG T AXIS: 102 DEGREES
EKG VENTRICULAR RATE: 70 BPM
EOSINOPHIL # BLD: 0 K/UL (ref 0–0.6)
EOSINOPHIL NFR BLD: 0.3 %
EPI CELLS #/AREA URNS HPF: ABNORMAL /HPF (ref 0–5)
GFR SERPLBLD CREATININE-BSD FMLA CKD-EPI: 32 ML/MIN/{1.73_M2}
GFR SERPLBLD CREATININE-BSD FMLA CKD-EPI: 41 ML/MIN/{1.73_M2}
GLUCOSE BLD-MCNC: 107 MG/DL (ref 70–99)
GLUCOSE BLD-MCNC: 158 MG/DL (ref 70–99)
GLUCOSE BLD-MCNC: 173 MG/DL (ref 70–99)
GLUCOSE SERPL-MCNC: 216 MG/DL (ref 70–99)
GLUCOSE SERPL-MCNC: 96 MG/DL (ref 70–99)
GLUCOSE UR STRIP.AUTO-MCNC: NEGATIVE MG/DL
HCO3 BLDV-SCNC: 27.4 MMOL/L (ref 24–28)
HCT VFR BLD AUTO: 28.7 % (ref 40.5–52.5)
HGB BLD-MCNC: 8.9 G/DL (ref 13.5–17.5)
HGB UR QL STRIP.AUTO: NEGATIVE
KETONES UR STRIP.AUTO-MCNC: NEGATIVE MG/DL
LACTATE BLDV-SCNC: 0.9 MMOL/L (ref 0.4–2)
LEUKOCYTE ESTERASE UR QL STRIP.AUTO: ABNORMAL
LYMPHOCYTES # BLD: 0.8 K/UL (ref 1–5.1)
LYMPHOCYTES NFR BLD: 12 %
MAGNESIUM SERPL-MCNC: 1.9 MG/DL (ref 1.8–2.4)
MCH RBC QN AUTO: 26.8 PG (ref 26–34)
MCHC RBC AUTO-ENTMCNC: 31 G/DL (ref 31–36)
MCV RBC AUTO: 86.3 FL (ref 80–100)
METHGB MFR BLDV: 0.1 % (ref 0–1.5)
MONOCYTES # BLD: 0.5 K/UL (ref 0–1.3)
MONOCYTES NFR BLD: 7.5 %
MUCOUS THREADS #/AREA URNS LPF: ABNORMAL /LPF
NEUTROPHILS # BLD: 5.3 K/UL (ref 1.7–7.7)
NEUTROPHILS NFR BLD: 79.8 %
NITRITE UR QL STRIP.AUTO: NEGATIVE
NT-PROBNP SERPL-MCNC: ABNORMAL PG/ML (ref 0–124)
PCO2 BLDV: 61.2 MMHG (ref 41–51)
PERFORMED ON: ABNORMAL
PH BLDV: 7.26 [PH] (ref 7.35–7.45)
PH UR STRIP.AUTO: 6 [PH] (ref 5–8)
PHOSPHATE SERPL-MCNC: 3.5 MG/DL (ref 2.5–4.9)
PHOSPHATE SERPL-MCNC: 4 MG/DL (ref 2.5–4.9)
PLATELET # BLD AUTO: 301 K/UL (ref 135–450)
PMV BLD AUTO: 8.8 FL (ref 5–10.5)
PO2 BLDV: 45.6 MMHG (ref 25–40)
POTASSIUM SERPL-SCNC: 4.7 MMOL/L (ref 3.5–5.1)
POTASSIUM SERPL-SCNC: 5.3 MMOL/L (ref 3.5–5.1)
POTASSIUM SERPL-SCNC: 5.9 MMOL/L (ref 3.5–5.1)
POTASSIUM SERPL-SCNC: 6.6 MMOL/L (ref 3.5–5.1)
PROT UR STRIP.AUTO-MCNC: ABNORMAL MG/DL
PROT UR-MCNC: 18.9 MG/DL
RBC # BLD AUTO: 3.33 M/UL (ref 4.2–5.9)
RBC #/AREA URNS HPF: ABNORMAL /HPF (ref 0–4)
SAO2 % BLDV: 75 %
SODIUM SERPL-SCNC: 133 MMOL/L (ref 136–145)
SODIUM SERPL-SCNC: 137 MMOL/L (ref 136–145)
SP GR UR STRIP.AUTO: 1.01 (ref 1–1.03)
TROPONIN T SERPL-MCNC: 0.05 NG/ML
TROPONIN T SERPL-MCNC: 0.08 NG/ML
UA COMPLETE W REFLEX CULTURE PNL UR: YES
UA DIPSTICK W REFLEX MICRO PNL UR: YES
URN SPEC COLLECT METH UR: ABNORMAL
UROBILINOGEN UR STRIP-ACNC: 0.2 E.U./DL
WBC # BLD AUTO: 6.6 K/UL (ref 4–11)
WBC #/AREA URNS HPF: ABNORMAL /HPF (ref 0–5)

## 2023-04-01 PROCEDURE — 2580000003 HC RX 258: Performed by: EMERGENCY MEDICINE

## 2023-04-01 PROCEDURE — 83605 ASSAY OF LACTIC ACID: CPT

## 2023-04-01 PROCEDURE — 87086 URINE CULTURE/COLONY COUNT: CPT

## 2023-04-01 PROCEDURE — 6370000000 HC RX 637 (ALT 250 FOR IP): Performed by: INTERNAL MEDICINE

## 2023-04-01 PROCEDURE — 2060000000 HC ICU INTERMEDIATE R&B

## 2023-04-01 PROCEDURE — 70450 CT HEAD/BRAIN W/O DYE: CPT

## 2023-04-01 PROCEDURE — 84132 ASSAY OF SERUM POTASSIUM: CPT

## 2023-04-01 PROCEDURE — 84484 ASSAY OF TROPONIN QUANT: CPT

## 2023-04-01 PROCEDURE — 71250 CT THORAX DX C-: CPT

## 2023-04-01 PROCEDURE — 51798 US URINE CAPACITY MEASURE: CPT

## 2023-04-01 PROCEDURE — 87186 SC STD MICRODIL/AGAR DIL: CPT

## 2023-04-01 PROCEDURE — 71045 X-RAY EXAM CHEST 1 VIEW: CPT

## 2023-04-01 PROCEDURE — 2700000000 HC OXYGEN THERAPY PER DAY

## 2023-04-01 PROCEDURE — 82803 BLOOD GASES ANY COMBINATION: CPT

## 2023-04-01 PROCEDURE — 81001 URINALYSIS AUTO W/SCOPE: CPT

## 2023-04-01 PROCEDURE — 93005 ELECTROCARDIOGRAM TRACING: CPT | Performed by: INTERNAL MEDICINE

## 2023-04-01 PROCEDURE — 96374 THER/PROPH/DIAG INJ IV PUSH: CPT

## 2023-04-01 PROCEDURE — 96375 TX/PRO/DX INJ NEW DRUG ADDON: CPT

## 2023-04-01 PROCEDURE — 6360000002 HC RX W HCPCS: Performed by: INTERNAL MEDICINE

## 2023-04-01 PROCEDURE — 6370000000 HC RX 637 (ALT 250 FOR IP): Performed by: EMERGENCY MEDICINE

## 2023-04-01 PROCEDURE — 36415 COLL VENOUS BLD VENIPUNCTURE: CPT

## 2023-04-01 PROCEDURE — 83880 ASSAY OF NATRIURETIC PEPTIDE: CPT

## 2023-04-01 PROCEDURE — 94761 N-INVAS EAR/PLS OXIMETRY MLT: CPT

## 2023-04-01 PROCEDURE — 93005 ELECTROCARDIOGRAM TRACING: CPT | Performed by: EMERGENCY MEDICINE

## 2023-04-01 PROCEDURE — 80069 RENAL FUNCTION PANEL: CPT

## 2023-04-01 PROCEDURE — 83735 ASSAY OF MAGNESIUM: CPT

## 2023-04-01 PROCEDURE — 86403 PARTICLE AGGLUT ANTBDY SCRN: CPT

## 2023-04-01 PROCEDURE — 94640 AIRWAY INHALATION TREATMENT: CPT

## 2023-04-01 PROCEDURE — 87077 CULTURE AEROBIC IDENTIFY: CPT

## 2023-04-01 PROCEDURE — 82570 ASSAY OF URINE CREATININE: CPT

## 2023-04-01 PROCEDURE — 6360000002 HC RX W HCPCS: Performed by: EMERGENCY MEDICINE

## 2023-04-01 PROCEDURE — 85025 COMPLETE CBC W/AUTO DIFF WBC: CPT

## 2023-04-01 PROCEDURE — 99285 EMERGENCY DEPT VISIT HI MDM: CPT

## 2023-04-01 PROCEDURE — 84156 ASSAY OF PROTEIN URINE: CPT

## 2023-04-01 PROCEDURE — 2580000003 HC RX 258: Performed by: INTERNAL MEDICINE

## 2023-04-01 PROCEDURE — 72125 CT NECK SPINE W/O DYE: CPT

## 2023-04-01 RX ORDER — POLYETHYLENE GLYCOL 3350 17 G/17G
17 POWDER, FOR SOLUTION ORAL DAILY PRN
Status: DISCONTINUED | OUTPATIENT
Start: 2023-04-01 | End: 2023-04-04 | Stop reason: HOSPADM

## 2023-04-01 RX ORDER — BUPRENORPHINE HYDROCHLORIDE AND NALOXONE HYDROCHLORIDE DIHYDRATE 2; .5 MG/1; MG/1
2 TABLET SUBLINGUAL DAILY
Status: DISCONTINUED | OUTPATIENT
Start: 2023-04-01 | End: 2023-04-04 | Stop reason: HOSPADM

## 2023-04-01 RX ORDER — ONDANSETRON 4 MG/1
4 TABLET, ORALLY DISINTEGRATING ORAL EVERY 8 HOURS PRN
Status: DISCONTINUED | OUTPATIENT
Start: 2023-04-01 | End: 2023-04-04 | Stop reason: HOSPADM

## 2023-04-01 RX ORDER — ATORVASTATIN CALCIUM 40 MG/1
40 TABLET, FILM COATED ORAL DAILY
Status: DISCONTINUED | OUTPATIENT
Start: 2023-04-01 | End: 2023-04-04 | Stop reason: HOSPADM

## 2023-04-01 RX ORDER — NIFEDIPINE 30 MG/1
30 TABLET, FILM COATED, EXTENDED RELEASE ORAL DAILY
Status: DISCONTINUED | OUTPATIENT
Start: 2023-04-01 | End: 2023-04-04 | Stop reason: HOSPADM

## 2023-04-01 RX ORDER — ALBUTEROL SULFATE 2.5 MG/3ML
2.5 SOLUTION RESPIRATORY (INHALATION) EVERY 6 HOURS PRN
Status: DISCONTINUED | OUTPATIENT
Start: 2023-04-01 | End: 2023-04-04 | Stop reason: HOSPADM

## 2023-04-01 RX ORDER — ACETAMINOPHEN 650 MG/1
650 SUPPOSITORY RECTAL EVERY 6 HOURS PRN
Status: DISCONTINUED | OUTPATIENT
Start: 2023-04-01 | End: 2023-04-04 | Stop reason: HOSPADM

## 2023-04-01 RX ORDER — GUAIFENESIN/DEXTROMETHORPHAN 100-10MG/5
5 SYRUP ORAL EVERY 4 HOURS PRN
Status: DISCONTINUED | OUTPATIENT
Start: 2023-04-01 | End: 2023-04-04 | Stop reason: HOSPADM

## 2023-04-01 RX ORDER — IPRATROPIUM BROMIDE AND ALBUTEROL SULFATE 2.5; .5 MG/3ML; MG/3ML
1 SOLUTION RESPIRATORY (INHALATION) EVERY 4 HOURS PRN
Status: DISCONTINUED | OUTPATIENT
Start: 2023-04-01 | End: 2023-04-01 | Stop reason: SDUPTHER

## 2023-04-01 RX ORDER — SODIUM CHLORIDE 0.9 % (FLUSH) 0.9 %
5-40 SYRINGE (ML) INJECTION PRN
Status: DISCONTINUED | OUTPATIENT
Start: 2023-04-01 | End: 2023-04-04 | Stop reason: HOSPADM

## 2023-04-01 RX ORDER — BENZONATATE 100 MG/1
100 CAPSULE ORAL 3 TIMES DAILY PRN
Status: DISCONTINUED | OUTPATIENT
Start: 2023-04-01 | End: 2023-04-04 | Stop reason: HOSPADM

## 2023-04-01 RX ORDER — SODIUM CHLORIDE 9 MG/ML
INJECTION, SOLUTION INTRAVENOUS PRN
Status: DISCONTINUED | OUTPATIENT
Start: 2023-04-01 | End: 2023-04-04 | Stop reason: HOSPADM

## 2023-04-01 RX ORDER — GLUCAGON 1 MG/ML
1 KIT INJECTION PRN
Status: DISCONTINUED | OUTPATIENT
Start: 2023-04-01 | End: 2023-04-03 | Stop reason: SDUPTHER

## 2023-04-01 RX ORDER — 0.9 % SODIUM CHLORIDE 0.9 %
1000 INTRAVENOUS SOLUTION INTRAVENOUS ONCE
Status: DISCONTINUED | OUTPATIENT
Start: 2023-04-01 | End: 2023-04-01

## 2023-04-01 RX ORDER — FUROSEMIDE 10 MG/ML
40 INJECTION INTRAMUSCULAR; INTRAVENOUS 2 TIMES DAILY
Status: DISCONTINUED | OUTPATIENT
Start: 2023-04-01 | End: 2023-04-04 | Stop reason: HOSPADM

## 2023-04-01 RX ORDER — FUROSEMIDE 10 MG/ML
20 INJECTION INTRAMUSCULAR; INTRAVENOUS ONCE
Status: COMPLETED | OUTPATIENT
Start: 2023-04-01 | End: 2023-04-01

## 2023-04-01 RX ORDER — SODIUM CHLORIDE 0.9 % (FLUSH) 0.9 %
5-40 SYRINGE (ML) INJECTION EVERY 12 HOURS SCHEDULED
Status: DISCONTINUED | OUTPATIENT
Start: 2023-04-01 | End: 2023-04-04 | Stop reason: HOSPADM

## 2023-04-01 RX ORDER — DULOXETIN HYDROCHLORIDE 20 MG/1
40 CAPSULE, DELAYED RELEASE ORAL DAILY
Status: DISCONTINUED | OUTPATIENT
Start: 2023-04-01 | End: 2023-04-04 | Stop reason: HOSPADM

## 2023-04-01 RX ORDER — DEXTROSE MONOHYDRATE 100 MG/ML
INJECTION, SOLUTION INTRAVENOUS CONTINUOUS PRN
Status: DISCONTINUED | OUTPATIENT
Start: 2023-04-01 | End: 2023-04-04 | Stop reason: HOSPADM

## 2023-04-01 RX ORDER — LEVOTHYROXINE SODIUM 0.05 MG/1
50 TABLET ORAL DAILY
Status: DISCONTINUED | OUTPATIENT
Start: 2023-04-01 | End: 2023-04-04 | Stop reason: HOSPADM

## 2023-04-01 RX ORDER — FUROSEMIDE 10 MG/ML
40 INJECTION INTRAMUSCULAR; INTRAVENOUS ONCE
Status: COMPLETED | OUTPATIENT
Start: 2023-04-01 | End: 2023-04-01

## 2023-04-01 RX ORDER — PREDNISONE 20 MG/1
40 TABLET ORAL DAILY
Status: DISCONTINUED | OUTPATIENT
Start: 2023-04-01 | End: 2023-04-03

## 2023-04-01 RX ORDER — ASPIRIN 81 MG/1
81 TABLET, CHEWABLE ORAL DAILY
Status: DISCONTINUED | OUTPATIENT
Start: 2023-04-01 | End: 2023-04-04 | Stop reason: HOSPADM

## 2023-04-01 RX ORDER — ACETAMINOPHEN 325 MG/1
650 TABLET ORAL EVERY 6 HOURS PRN
Status: DISCONTINUED | OUTPATIENT
Start: 2023-04-01 | End: 2023-04-04 | Stop reason: HOSPADM

## 2023-04-01 RX ORDER — ONDANSETRON 2 MG/ML
4 INJECTION INTRAMUSCULAR; INTRAVENOUS EVERY 6 HOURS PRN
Status: DISCONTINUED | OUTPATIENT
Start: 2023-04-01 | End: 2023-04-04 | Stop reason: HOSPADM

## 2023-04-01 RX ADMIN — FUROSEMIDE 40 MG: 10 INJECTION, SOLUTION INTRAMUSCULAR; INTRAVENOUS at 22:59

## 2023-04-01 RX ADMIN — BUPRENORPHINE AND NALOXONE 2 TABLET: 2; .5 TABLET SUBLINGUAL at 13:08

## 2023-04-01 RX ADMIN — PREDNISONE 40 MG: 20 TABLET ORAL at 17:13

## 2023-04-01 RX ADMIN — FUROSEMIDE 40 MG: 10 INJECTION, SOLUTION INTRAMUSCULAR; INTRAVENOUS at 17:14

## 2023-04-01 RX ADMIN — METOPROLOL TARTRATE 25 MG: 25 TABLET, FILM COATED ORAL at 20:13

## 2023-04-01 RX ADMIN — SODIUM CHLORIDE, PRESERVATIVE FREE 10 ML: 5 INJECTION INTRAVENOUS at 14:01

## 2023-04-01 RX ADMIN — DULOXETINE HYDROCHLORIDE 40 MG: 20 CAPSULE, DELAYED RELEASE ORAL at 14:09

## 2023-04-01 RX ADMIN — FUROSEMIDE 40 MG: 10 INJECTION, SOLUTION INTRAMUSCULAR; INTRAVENOUS at 14:52

## 2023-04-01 RX ADMIN — ATORVASTATIN CALCIUM 40 MG: 40 TABLET, FILM COATED ORAL at 13:08

## 2023-04-01 RX ADMIN — ALBUTEROL SULFATE 2.5 MG: 2.5 SOLUTION RESPIRATORY (INHALATION) at 16:43

## 2023-04-01 RX ADMIN — NIFEDIPINE 30 MG: 30 TABLET, EXTENDED RELEASE ORAL at 13:08

## 2023-04-01 RX ADMIN — DEXTROSE MONOHYDRATE 250 ML: 100 INJECTION, SOLUTION INTRAVENOUS at 05:40

## 2023-04-01 RX ADMIN — INSULIN HUMAN 10 UNITS: 100 INJECTION, SOLUTION PARENTERAL at 05:41

## 2023-04-01 RX ADMIN — APIXABAN 5 MG: 5 TABLET, FILM COATED ORAL at 14:52

## 2023-04-01 RX ADMIN — SODIUM ZIRCONIUM CYCLOSILICATE 10 G: 10 POWDER, FOR SUSPENSION ORAL at 06:26

## 2023-04-01 RX ADMIN — LEVOTHYROXINE SODIUM 50 MCG: 50 TABLET ORAL at 13:08

## 2023-04-01 RX ADMIN — FUROSEMIDE 20 MG: 10 INJECTION, SOLUTION INTRAMUSCULAR; INTRAVENOUS at 05:19

## 2023-04-01 RX ADMIN — APIXABAN 5 MG: 5 TABLET, FILM COATED ORAL at 20:12

## 2023-04-01 RX ADMIN — ASPIRIN 81 MG 81 MG: 81 TABLET ORAL at 13:08

## 2023-04-01 ASSESSMENT — PAIN - FUNCTIONAL ASSESSMENT: PAIN_FUNCTIONAL_ASSESSMENT: NONE - DENIES PAIN

## 2023-04-01 ASSESSMENT — PAIN SCALES - GENERAL: PAINLEVEL_OUTOF10: 0

## 2023-04-01 NOTE — CONSULTS
301     Recent Labs     04/01/23  0307 04/01/23  0414 04/01/23  0734   *  --   --    K 6.6* 5.9* 5.3*   CL 99  --   --    CO2 24  --   --    BUN 43*  --   --    CREATININE 2.2*  --   --    GLUCOSE 216*  --   --    MG 1.90  --   --    PHOS 4.0  --   --             Thanks,   [facility]  [address] [facility], [address] [place] [address]  Office: [phone]  Fax: [phone]

## 2023-04-01 NOTE — ED PROVIDER NOTES
(VENTOLIN HFA) 108 (90 BASE) MCG/ACT INHALER    Inhale 2 puffs into the lungs every 6 hours as needed for Wheezing    APIXABAN (ELIQUIS) 5 MG TABS TABLET    Take by mouth 2 times daily    ASPIRIN 81 MG CHEWABLE TABLET    Take 81 mg by mouth daily    ATORVASTATIN (LIPITOR) 40 MG TABLET    Take 40 mg by mouth daily    AZITHROMYCIN (ZITHROMAX) 250 MG TABLET    Take 250 mg by mouth daily    BUPRENORPHINE-NALOXONE (SUBOXONE) 4-1 MG FILM SL FILM    Place 1 Film under the tongue daily. DULOXETINE HCL 40 MG CPEP    Take by mouth    FERROUS SULFATE (IRON 325) 325 (65 FE) MG TABLET    Take 325 mg by mouth daily (with breakfast)    FLUTICASONE FUROATE 50 MCG/ACT AEPB    Inhale into the lungs    FLUTICASONE-SALMETEROL (WIXELA INHUB) 500-50 MCG/ACT AEPB DISKUS INHALER    Inhale 1 puff into the lungs every 12 hours    FUROSEMIDE (LASIX) 40 MG TABLET    Take 40 mg by mouth in the morning and 40 mg in the evening. GABAPENTIN (NEURONTIN) 100 MG CAPSULE    Take 100 mg by mouth 3 times daily.     HYDROXYZINE HCL (ATARAX) 25 MG TABLET    Take 25 mg by mouth 3 times daily as needed for Itching    IPRATROPIUM-ALBUTEROL (DUONEB) 0.5-2.5 (3) MG/3ML SOLN NEBULIZER SOLUTION    Inhale 1 vial into the lungs every 4 hours    LEVOTHYROXINE (SYNTHROID) 50 MCG TABLET    Take 50 mcg by mouth Daily    MELATONIN 3 MG TABS TABLET    Take 3 mg by mouth daily    METOPROLOL TARTRATE (LOPRESSOR) 25 MG TABLET    Take 25 mg by mouth 2 times daily    NALOXONE 4 MG/0.1ML LIQD NASAL SPRAY    1 spray by Nasal route as needed for Opioid Reversal    NICOTINE (NICOTINE STEP 3) 7 MG/24HR    Place 1 patch onto the skin every 24 hours    NIFEDIPINE (PROCARDIA XL) 30 MG EXTENDED RELEASE TABLET    Take 30 mg by mouth daily    OMEPRAZOLE (PRILOSEC) 40 MG DELAYED RELEASE CAPSULE    Take 40 mg by mouth daily    PREDNISONE (DELTASONE) 10 MG TABLET    Take 10 mg by mouth daily    TIOTROPIUM (SPIRIVA) 18 MCG INHALATION CAPSULE    Inhale 18 mcg into the lungs daily

## 2023-04-02 LAB
ANION GAP SERPL CALCULATED.3IONS-SCNC: 8 MMOL/L (ref 3–16)
BASOPHILS # BLD: 0 K/UL (ref 0–0.2)
BASOPHILS NFR BLD: 0.3 %
BUN SERPL-MCNC: 40 MG/DL (ref 7–20)
CALCIUM SERPL-MCNC: 9.7 MG/DL (ref 8.3–10.6)
CHLORIDE SERPL-SCNC: 98 MMOL/L (ref 99–110)
CO2 SERPL-SCNC: 32 MMOL/L (ref 21–32)
CREAT SERPL-MCNC: 1.8 MG/DL (ref 0.8–1.3)
DEPRECATED RDW RBC AUTO: 16.6 % (ref 12.4–15.4)
EKG ATRIAL RATE: 56 BPM
EKG DIAGNOSIS: NORMAL
EKG Q-T INTERVAL: 416 MS
EKG QRS DURATION: 90 MS
EKG QTC CALCULATION (BAZETT): 401 MS
EKG R AXIS: 33 DEGREES
EKG T AXIS: 44 DEGREES
EKG VENTRICULAR RATE: 56 BPM
EOSINOPHIL # BLD: 0 K/UL (ref 0–0.6)
EOSINOPHIL NFR BLD: 0 %
GFR SERPLBLD CREATININE-BSD FMLA CKD-EPI: 41 ML/MIN/{1.73_M2}
GLUCOSE BLD-MCNC: 169 MG/DL (ref 70–99)
GLUCOSE SERPL-MCNC: 183 MG/DL (ref 70–99)
HCT VFR BLD AUTO: 30.1 % (ref 40.5–52.5)
HGB BLD-MCNC: 9.5 G/DL (ref 13.5–17.5)
LYMPHOCYTES # BLD: 0.3 K/UL (ref 1–5.1)
LYMPHOCYTES NFR BLD: 4.8 %
MCH RBC QN AUTO: 26.8 PG (ref 26–34)
MCHC RBC AUTO-ENTMCNC: 31.4 G/DL (ref 31–36)
MCV RBC AUTO: 85.1 FL (ref 80–100)
MONOCYTES # BLD: 0.1 K/UL (ref 0–1.3)
MONOCYTES NFR BLD: 1.2 %
NEUTROPHILS # BLD: 6.3 K/UL (ref 1.7–7.7)
NEUTROPHILS NFR BLD: 93.7 %
PERFORMED ON: ABNORMAL
PLATELET # BLD AUTO: 309 K/UL (ref 135–450)
PMV BLD AUTO: 8.8 FL (ref 5–10.5)
POTASSIUM SERPL-SCNC: 5.6 MMOL/L (ref 3.5–5.1)
RBC # BLD AUTO: 3.54 M/UL (ref 4.2–5.9)
SODIUM SERPL-SCNC: 138 MMOL/L (ref 136–145)
TROPONIN T SERPL-MCNC: 0.05 NG/ML
TROPONIN T SERPL-MCNC: 0.06 NG/ML
WBC # BLD AUTO: 6.7 K/UL (ref 4–11)

## 2023-04-02 PROCEDURE — 94640 AIRWAY INHALATION TREATMENT: CPT

## 2023-04-02 PROCEDURE — 36415 COLL VENOUS BLD VENIPUNCTURE: CPT

## 2023-04-02 PROCEDURE — 6370000000 HC RX 637 (ALT 250 FOR IP): Performed by: INTERNAL MEDICINE

## 2023-04-02 PROCEDURE — 99223 1ST HOSP IP/OBS HIGH 75: CPT | Performed by: INTERNAL MEDICINE

## 2023-04-02 PROCEDURE — 2700000000 HC OXYGEN THERAPY PER DAY

## 2023-04-02 PROCEDURE — 93010 ELECTROCARDIOGRAM REPORT: CPT | Performed by: INTERNAL MEDICINE

## 2023-04-02 PROCEDURE — 80048 BASIC METABOLIC PNL TOTAL CA: CPT

## 2023-04-02 PROCEDURE — 85025 COMPLETE CBC W/AUTO DIFF WBC: CPT

## 2023-04-02 PROCEDURE — 2580000003 HC RX 258: Performed by: INTERNAL MEDICINE

## 2023-04-02 PROCEDURE — 94761 N-INVAS EAR/PLS OXIMETRY MLT: CPT

## 2023-04-02 PROCEDURE — 93005 ELECTROCARDIOGRAM TRACING: CPT | Performed by: INTERNAL MEDICINE

## 2023-04-02 PROCEDURE — 84484 ASSAY OF TROPONIN QUANT: CPT

## 2023-04-02 PROCEDURE — 2060000000 HC ICU INTERMEDIATE R&B

## 2023-04-02 PROCEDURE — 92610 EVALUATE SWALLOWING FUNCTION: CPT

## 2023-04-02 PROCEDURE — 6360000002 HC RX W HCPCS: Performed by: INTERNAL MEDICINE

## 2023-04-02 RX ADMIN — FUROSEMIDE 40 MG: 10 INJECTION, SOLUTION INTRAMUSCULAR; INTRAVENOUS at 17:01

## 2023-04-02 RX ADMIN — MOMETASONE FUROATE AND FORMOTEROL FUMARATE DIHYDRATE 2 PUFF: 200; 5 AEROSOL RESPIRATORY (INHALATION) at 18:56

## 2023-04-02 RX ADMIN — APIXABAN 5 MG: 5 TABLET, FILM COATED ORAL at 20:13

## 2023-04-02 RX ADMIN — TIOTROPIUM BROMIDE INHALATION SPRAY 2 PUFF: 3.12 SPRAY, METERED RESPIRATORY (INHALATION) at 08:51

## 2023-04-02 RX ADMIN — NIFEDIPINE 30 MG: 30 TABLET, EXTENDED RELEASE ORAL at 08:12

## 2023-04-02 RX ADMIN — SODIUM ZIRCONIUM CYCLOSILICATE 5 G: 5 POWDER, FOR SUSPENSION ORAL at 13:40

## 2023-04-02 RX ADMIN — LEVOTHYROXINE SODIUM 50 MCG: 50 TABLET ORAL at 06:06

## 2023-04-02 RX ADMIN — MOMETASONE FUROATE AND FORMOTEROL FUMARATE DIHYDRATE 2 PUFF: 200; 5 AEROSOL RESPIRATORY (INHALATION) at 08:51

## 2023-04-02 RX ADMIN — METOPROLOL TARTRATE 25 MG: 25 TABLET, FILM COATED ORAL at 20:12

## 2023-04-02 RX ADMIN — DULOXETINE HYDROCHLORIDE 40 MG: 20 CAPSULE, DELAYED RELEASE ORAL at 08:13

## 2023-04-02 RX ADMIN — APIXABAN 5 MG: 5 TABLET, FILM COATED ORAL at 08:13

## 2023-04-02 RX ADMIN — SODIUM CHLORIDE: 9 INJECTION, SOLUTION INTRAVENOUS at 10:47

## 2023-04-02 RX ADMIN — SODIUM ZIRCONIUM CYCLOSILICATE 5 G: 5 POWDER, FOR SUSPENSION ORAL at 08:12

## 2023-04-02 RX ADMIN — SODIUM CHLORIDE, PRESERVATIVE FREE 10 ML: 5 INJECTION INTRAVENOUS at 20:14

## 2023-04-02 RX ADMIN — FUROSEMIDE 40 MG: 10 INJECTION, SOLUTION INTRAMUSCULAR; INTRAVENOUS at 08:14

## 2023-04-02 RX ADMIN — SODIUM CHLORIDE, PRESERVATIVE FREE 10 ML: 5 INJECTION INTRAVENOUS at 08:28

## 2023-04-02 RX ADMIN — CEFTRIAXONE 1000 MG: 1 INJECTION, POWDER, FOR SOLUTION INTRAMUSCULAR; INTRAVENOUS at 10:48

## 2023-04-02 RX ADMIN — PREDNISONE 40 MG: 20 TABLET ORAL at 08:13

## 2023-04-02 RX ADMIN — ATORVASTATIN CALCIUM 40 MG: 40 TABLET, FILM COATED ORAL at 08:14

## 2023-04-02 RX ADMIN — METOPROLOL TARTRATE 25 MG: 25 TABLET, FILM COATED ORAL at 08:13

## 2023-04-02 RX ADMIN — ASPIRIN 81 MG 81 MG: 81 TABLET ORAL at 08:13

## 2023-04-02 RX ADMIN — BUPRENORPHINE AND NALOXONE 2 TABLET: 2; .5 TABLET SUBLINGUAL at 08:13

## 2023-04-02 NOTE — DISCHARGE INSTRUCTIONS
Extra Heart Failure sites:     [email or web address].com/   --- this is American Heart Association interactive Healthier Living with Heart Failure guidebook. Please click hyperlink or copy / paste link into search bar. Use your mouse to scroll through the pages. Lots of information about weight monitoring, diet tips, activity, meds, etc    HF [place] sandy  -- this is a free smart phone sandy available for iPhone and Android download. Use your phone to track sodium / fluid intake, zone tool symptom tracking, weights, medications, etc. Click on this hyperlink  HF [place] Sandy   for QR code for easy download. DASH (Dietary Approach to Stop Hypertension) diet --  SeekAlumni.no -- this diet is a flexible eating plan that promotes heart healthy eating style. Click on hyperlink or copy / paste link into search bar. Lots of low sodium recipes and tips.     [email or web address].ca  -- more free recipes

## 2023-04-02 NOTE — CONSULTS
IntraVENous PRN Patricia Black, DO        glucagon injection 1 mg  1 mg SubCUTAneous PRN Patricia Black, DO        dextrose 10 % infusion   IntraVENous Continuous PRN Patircia Black, DO        sodium chloride flush 0.9 % injection 5-40 mL  5-40 mL IntraVENous 2 times per day Patricia Black, DO   10 mL at 04/02/23 0828    sodium chloride flush 0.9 % injection 5-40 mL  5-40 mL IntraVENous PRN Patricia Black, DO        0.9 % sodium chloride infusion   IntraVENous PRN Casper Arevalo, DO 25 mL/hr at 04/02/23 1047 New Bag at 04/02/23 1047    ondansetron (ZOFRAN-ODT) disintegrating tablet 4 mg  4 mg Oral Q8H PRN Patricia Black, DO        Or    ondansetron (ZOFRAN) injection 4 mg  4 mg IntraVENous Q6H PRN Patricia Black, DO        polyethylene glycol (GLYCOLAX) packet 17 g  17 g Oral Daily PRN Patricia Black, DO        acetaminophen (TYLENOL) tablet 650 mg  650 mg Oral Q6H PRN Patricia Black, DO        Or    acetaminophen (TYLENOL) suppository 650 mg  650 mg Rectal Q6H PRN Patricia Black, DO        albuterol (PROVENTIL) nebulizer solution 2.5 mg  2.5 mg Nebulization Q6H PRN Patricia Black, DO   2.5 mg at 04/01/23 1643    aspirin chewable tablet 81 mg  81 mg Oral Daily Patricia Black, DO   81 mg at 04/02/23 0813    atorvastatin (LIPITOR) tablet 40 mg  40 mg Oral Daily Patricia Black, DO   40 mg at 04/02/23 0814    mometasone-formoterol (DULERA) 200-5 MCG/ACT inhaler 2 puff  2 puff Inhalation BID Casper Arevalo, DO   2 puff at 04/02/23 0851    levothyroxine (SYNTHROID) tablet 50 mcg  50 mcg Oral Daily Patricia Black, DO   50 mcg at 04/02/23 0606    metoprolol tartrate (LOPRESSOR) tablet 25 mg  25 mg Oral BID Emma Mckee MD   25 mg at 04/02/23 0813    NIFEdipine (ADALAT CC) extended release tablet 30 mg  30 mg Oral Daily Patricia Black, DO   30 mg at 04/02/23 3711    buprenorphine-naloxone (SUBOXONE) 2-0.5 MG SL tablet 2 tablet  2 tablet SubLINGual Daily Patricia Black, DO   2 tablet at 04/02/23 0813    DULoxetine (CYMBALTA) extended release capsule 40 mg  40 mg Oral Daily Casper Arevalo

## 2023-04-03 LAB
ANION GAP SERPL CALCULATED.3IONS-SCNC: 8 MMOL/L (ref 3–16)
BACTERIA UR CULT: ABNORMAL
BASOPHILS # BLD: 0 K/UL (ref 0–0.2)
BASOPHILS NFR BLD: 0.4 %
BUN SERPL-MCNC: 40 MG/DL (ref 7–20)
CALCIUM SERPL-MCNC: 9.4 MG/DL (ref 8.3–10.6)
CHLORIDE SERPL-SCNC: 98 MMOL/L (ref 99–110)
CO2 SERPL-SCNC: 35 MMOL/L (ref 21–32)
CREAT SERPL-MCNC: 1.4 MG/DL (ref 0.8–1.3)
DEPRECATED RDW RBC AUTO: 15.9 % (ref 12.4–15.4)
EKG ATRIAL RATE: 40 BPM
EKG DIAGNOSIS: NORMAL
EKG Q-T INTERVAL: 418 MS
EKG QRS DURATION: 92 MS
EKG QTC CALCULATION (BAZETT): 418 MS
EKG R AXIS: 32 DEGREES
EKG T AXIS: 28 DEGREES
EKG VENTRICULAR RATE: 60 BPM
EOSINOPHIL # BLD: 0 K/UL (ref 0–0.6)
EOSINOPHIL NFR BLD: 0.1 %
GFR SERPLBLD CREATININE-BSD FMLA CKD-EPI: 56 ML/MIN/{1.73_M2}
GLUCOSE BLD-MCNC: 190 MG/DL (ref 70–99)
GLUCOSE BLD-MCNC: 313 MG/DL (ref 70–99)
GLUCOSE SERPL-MCNC: 126 MG/DL (ref 70–99)
HCT VFR BLD AUTO: 31.7 % (ref 40.5–52.5)
HGB BLD-MCNC: 10 G/DL (ref 13.5–17.5)
LV EF: 63 %
LVEF MODALITY: NORMAL
LYMPHOCYTES # BLD: 0.8 K/UL (ref 1–5.1)
LYMPHOCYTES NFR BLD: 8.8 %
MCH RBC QN AUTO: 26.9 PG (ref 26–34)
MCHC RBC AUTO-ENTMCNC: 31.5 G/DL (ref 31–36)
MCV RBC AUTO: 85.4 FL (ref 80–100)
MONOCYTES # BLD: 0.7 K/UL (ref 0–1.3)
MONOCYTES NFR BLD: 7.1 %
NEUTROPHILS # BLD: 7.7 K/UL (ref 1.7–7.7)
NEUTROPHILS NFR BLD: 83.6 %
ORGANISM: ABNORMAL
PERFORMED ON: ABNORMAL
PERFORMED ON: ABNORMAL
PLATELET # BLD AUTO: 334 K/UL (ref 135–450)
PMV BLD AUTO: 8.6 FL (ref 5–10.5)
POTASSIUM SERPL-SCNC: 5.2 MMOL/L (ref 3.5–5.1)
RBC # BLD AUTO: 3.71 M/UL (ref 4.2–5.9)
SODIUM SERPL-SCNC: 141 MMOL/L (ref 136–145)
WBC # BLD AUTO: 9.2 K/UL (ref 4–11)

## 2023-04-03 PROCEDURE — 6360000002 HC RX W HCPCS: Performed by: INTERNAL MEDICINE

## 2023-04-03 PROCEDURE — 97530 THERAPEUTIC ACTIVITIES: CPT

## 2023-04-03 PROCEDURE — 2700000000 HC OXYGEN THERAPY PER DAY

## 2023-04-03 PROCEDURE — 6370000000 HC RX 637 (ALT 250 FOR IP): Performed by: STUDENT IN AN ORGANIZED HEALTH CARE EDUCATION/TRAINING PROGRAM

## 2023-04-03 PROCEDURE — 99232 SBSQ HOSP IP/OBS MODERATE 35: CPT | Performed by: INTERNAL MEDICINE

## 2023-04-03 PROCEDURE — 80048 BASIC METABOLIC PNL TOTAL CA: CPT

## 2023-04-03 PROCEDURE — 85025 COMPLETE CBC W/AUTO DIFF WBC: CPT

## 2023-04-03 PROCEDURE — 6370000000 HC RX 637 (ALT 250 FOR IP): Performed by: INTERNAL MEDICINE

## 2023-04-03 PROCEDURE — 94761 N-INVAS EAR/PLS OXIMETRY MLT: CPT

## 2023-04-03 PROCEDURE — 97535 SELF CARE MNGMENT TRAINING: CPT

## 2023-04-03 PROCEDURE — C8929 TTE W OR WO FOL WCON,DOPPLER: HCPCS

## 2023-04-03 PROCEDURE — 93005 ELECTROCARDIOGRAM TRACING: CPT | Performed by: INTERNAL MEDICINE

## 2023-04-03 PROCEDURE — 2580000003 HC RX 258: Performed by: INTERNAL MEDICINE

## 2023-04-03 PROCEDURE — 97161 PT EVAL LOW COMPLEX 20 MIN: CPT

## 2023-04-03 PROCEDURE — 97116 GAIT TRAINING THERAPY: CPT

## 2023-04-03 PROCEDURE — 2060000000 HC ICU INTERMEDIATE R&B

## 2023-04-03 PROCEDURE — 36415 COLL VENOUS BLD VENIPUNCTURE: CPT

## 2023-04-03 PROCEDURE — 6360000004 HC RX CONTRAST MEDICATION: Performed by: INTERNAL MEDICINE

## 2023-04-03 PROCEDURE — 94640 AIRWAY INHALATION TREATMENT: CPT

## 2023-04-03 PROCEDURE — 97165 OT EVAL LOW COMPLEX 30 MIN: CPT

## 2023-04-03 PROCEDURE — 93010 ELECTROCARDIOGRAM REPORT: CPT | Performed by: INTERNAL MEDICINE

## 2023-04-03 RX ORDER — GLUCAGON 1 MG/ML
1 KIT INJECTION PRN
Status: DISCONTINUED | OUTPATIENT
Start: 2023-04-03 | End: 2023-04-04 | Stop reason: HOSPADM

## 2023-04-03 RX ORDER — INSULIN LISPRO 100 [IU]/ML
0-4 INJECTION, SOLUTION INTRAVENOUS; SUBCUTANEOUS NIGHTLY
Status: DISCONTINUED | OUTPATIENT
Start: 2023-04-03 | End: 2023-04-03 | Stop reason: SDUPTHER

## 2023-04-03 RX ORDER — INSULIN LISPRO 100 [IU]/ML
0-8 INJECTION, SOLUTION INTRAVENOUS; SUBCUTANEOUS
Status: DISCONTINUED | OUTPATIENT
Start: 2023-04-03 | End: 2023-04-04 | Stop reason: HOSPADM

## 2023-04-03 RX ORDER — INSULIN LISPRO 100 [IU]/ML
0-8 INJECTION, SOLUTION INTRAVENOUS; SUBCUTANEOUS
Status: DISCONTINUED | OUTPATIENT
Start: 2023-04-03 | End: 2023-04-03

## 2023-04-03 RX ORDER — DEXTROSE MONOHYDRATE 100 MG/ML
INJECTION, SOLUTION INTRAVENOUS CONTINUOUS PRN
Status: DISCONTINUED | OUTPATIENT
Start: 2023-04-03 | End: 2023-04-04 | Stop reason: HOSPADM

## 2023-04-03 RX ORDER — INSULIN LISPRO 100 [IU]/ML
0-4 INJECTION, SOLUTION INTRAVENOUS; SUBCUTANEOUS NIGHTLY
Status: DISCONTINUED | OUTPATIENT
Start: 2023-04-03 | End: 2023-04-04 | Stop reason: HOSPADM

## 2023-04-03 RX ORDER — AMOXICILLIN AND CLAVULANATE POTASSIUM 875; 125 MG/1; MG/1
1 TABLET, FILM COATED ORAL EVERY 12 HOURS SCHEDULED
Status: DISCONTINUED | OUTPATIENT
Start: 2023-04-04 | End: 2023-04-04 | Stop reason: HOSPADM

## 2023-04-03 RX ADMIN — FUROSEMIDE 40 MG: 10 INJECTION, SOLUTION INTRAMUSCULAR; INTRAVENOUS at 10:29

## 2023-04-03 RX ADMIN — DULOXETINE HYDROCHLORIDE 40 MG: 20 CAPSULE, DELAYED RELEASE ORAL at 10:28

## 2023-04-03 RX ADMIN — APIXABAN 5 MG: 5 TABLET, FILM COATED ORAL at 20:39

## 2023-04-03 RX ADMIN — METOPROLOL TARTRATE 25 MG: 25 TABLET, FILM COATED ORAL at 20:39

## 2023-04-03 RX ADMIN — SODIUM CHLORIDE, PRESERVATIVE FREE 10 ML: 5 INJECTION INTRAVENOUS at 10:28

## 2023-04-03 RX ADMIN — CEFTRIAXONE 1000 MG: 1 INJECTION, POWDER, FOR SOLUTION INTRAMUSCULAR; INTRAVENOUS at 10:36

## 2023-04-03 RX ADMIN — NIFEDIPINE 30 MG: 30 TABLET, EXTENDED RELEASE ORAL at 10:28

## 2023-04-03 RX ADMIN — PREDNISONE 40 MG: 20 TABLET ORAL at 10:29

## 2023-04-03 RX ADMIN — SODIUM ZIRCONIUM CYCLOSILICATE 10 G: 10 POWDER, FOR SUSPENSION ORAL at 10:30

## 2023-04-03 RX ADMIN — LEVOTHYROXINE SODIUM 50 MCG: 50 TABLET ORAL at 07:16

## 2023-04-03 RX ADMIN — ATORVASTATIN CALCIUM 40 MG: 40 TABLET, FILM COATED ORAL at 10:29

## 2023-04-03 RX ADMIN — SODIUM CHLORIDE, PRESERVATIVE FREE 10 ML: 5 INJECTION INTRAVENOUS at 20:40

## 2023-04-03 RX ADMIN — BUPRENORPHINE AND NALOXONE 2 TABLET: 2; .5 TABLET SUBLINGUAL at 10:28

## 2023-04-03 RX ADMIN — TIOTROPIUM BROMIDE INHALATION SPRAY 2 PUFF: 3.12 SPRAY, METERED RESPIRATORY (INHALATION) at 09:27

## 2023-04-03 RX ADMIN — APIXABAN 5 MG: 5 TABLET, FILM COATED ORAL at 10:29

## 2023-04-03 RX ADMIN — MOMETASONE FUROATE AND FORMOTEROL FUMARATE DIHYDRATE 2 PUFF: 200; 5 AEROSOL RESPIRATORY (INHALATION) at 21:44

## 2023-04-03 RX ADMIN — ASPIRIN 81 MG 81 MG: 81 TABLET ORAL at 10:29

## 2023-04-03 RX ADMIN — METOPROLOL TARTRATE 25 MG: 25 TABLET, FILM COATED ORAL at 10:29

## 2023-04-03 RX ADMIN — MOMETASONE FUROATE AND FORMOTEROL FUMARATE DIHYDRATE 2 PUFF: 200; 5 AEROSOL RESPIRATORY (INHALATION) at 09:26

## 2023-04-03 RX ADMIN — FUROSEMIDE 40 MG: 10 INJECTION, SOLUTION INTRAMUSCULAR; INTRAVENOUS at 17:43

## 2023-04-03 RX ADMIN — PERFLUTREN 1.5 ML: 6.52 INJECTION, SUSPENSION INTRAVENOUS at 13:24

## 2023-04-04 VITALS
TEMPERATURE: 98.5 F | WEIGHT: 188.2 LBS | RESPIRATION RATE: 14 BRPM | SYSTOLIC BLOOD PRESSURE: 144 MMHG | BODY MASS INDEX: 28.52 KG/M2 | HEART RATE: 55 BPM | DIASTOLIC BLOOD PRESSURE: 63 MMHG | OXYGEN SATURATION: 100 % | HEIGHT: 68 IN

## 2023-04-04 LAB
ANION GAP SERPL CALCULATED.3IONS-SCNC: 7 MMOL/L (ref 3–16)
BASE EXCESS BLDV CALC-SCNC: 12.4 MMOL/L (ref -2–3)
BASOPHILS # BLD: 0.1 K/UL (ref 0–0.2)
BASOPHILS NFR BLD: 0.8 %
BUN SERPL-MCNC: 41 MG/DL (ref 7–20)
CALCIUM SERPL-MCNC: 9.2 MG/DL (ref 8.3–10.6)
CHLORIDE SERPL-SCNC: 96 MMOL/L (ref 99–110)
CO2 BLDV-SCNC: 42 MMOL/L
CO2 SERPL-SCNC: 37 MMOL/L (ref 21–32)
COHGB MFR BLDV: 1.2 % (ref 0–1.5)
CREAT SERPL-MCNC: 1.3 MG/DL (ref 0.8–1.3)
DEPRECATED RDW RBC AUTO: 15.8 % (ref 12.4–15.4)
DO-HGB MFR BLDV: 38.2 %
EKG ATRIAL RATE: 63 BPM
EKG DIAGNOSIS: NORMAL
EKG Q-T INTERVAL: 438 MS
EKG QRS DURATION: 86 MS
EKG QTC CALCULATION (BAZETT): 433 MS
EKG R AXIS: 38 DEGREES
EKG T AXIS: 77 DEGREES
EKG VENTRICULAR RATE: 59 BPM
EOSINOPHIL # BLD: 0 K/UL (ref 0–0.6)
EOSINOPHIL NFR BLD: 0.2 %
GFR SERPLBLD CREATININE-BSD FMLA CKD-EPI: >60 ML/MIN/{1.73_M2}
GLUCOSE BLD-MCNC: 105 MG/DL (ref 70–99)
GLUCOSE BLD-MCNC: 155 MG/DL (ref 70–99)
GLUCOSE BLD-MCNC: 169 MG/DL (ref 70–99)
GLUCOSE SERPL-MCNC: 165 MG/DL (ref 70–99)
HCO3 BLDV-SCNC: 40.2 MMOL/L (ref 24–28)
HCT VFR BLD AUTO: 33.8 % (ref 40.5–52.5)
HGB BLD-MCNC: 10.8 G/DL (ref 13.5–17.5)
LYMPHOCYTES # BLD: 0.8 K/UL (ref 1–5.1)
LYMPHOCYTES NFR BLD: 10.1 %
MAGNESIUM SERPL-MCNC: 1.8 MG/DL (ref 1.8–2.4)
MCH RBC QN AUTO: 27 PG (ref 26–34)
MCHC RBC AUTO-ENTMCNC: 31.9 G/DL (ref 31–36)
MCV RBC AUTO: 84.4 FL (ref 80–100)
METHGB MFR BLDV: 0.3 % (ref 0–1.5)
MONOCYTES # BLD: 0.6 K/UL (ref 0–1.3)
MONOCYTES NFR BLD: 7.1 %
NEUTROPHILS # BLD: 6.5 K/UL (ref 1.7–7.7)
NEUTROPHILS NFR BLD: 81.8 %
PCO2 BLDV: 61.6 MMHG (ref 41–51)
PERFORMED ON: ABNORMAL
PH BLDV: 7.42 [PH] (ref 7.35–7.45)
PLATELET # BLD AUTO: 357 K/UL (ref 135–450)
PMV BLD AUTO: 8.4 FL (ref 5–10.5)
PO2 BLDV: 33.9 MMHG (ref 25–40)
POTASSIUM SERPL-SCNC: 4.1 MMOL/L (ref 3.5–5.1)
RBC # BLD AUTO: 4 M/UL (ref 4.2–5.9)
SAO2 % BLDV: 61 %
SODIUM SERPL-SCNC: 140 MMOL/L (ref 136–145)
WBC # BLD AUTO: 8 K/UL (ref 4–11)

## 2023-04-04 PROCEDURE — 6370000000 HC RX 637 (ALT 250 FOR IP): Performed by: STUDENT IN AN ORGANIZED HEALTH CARE EDUCATION/TRAINING PROGRAM

## 2023-04-04 PROCEDURE — 6370000000 HC RX 637 (ALT 250 FOR IP): Performed by: INTERNAL MEDICINE

## 2023-04-04 PROCEDURE — 83735 ASSAY OF MAGNESIUM: CPT

## 2023-04-04 PROCEDURE — 99232 SBSQ HOSP IP/OBS MODERATE 35: CPT | Performed by: INTERNAL MEDICINE

## 2023-04-04 PROCEDURE — 2580000003 HC RX 258: Performed by: INTERNAL MEDICINE

## 2023-04-04 PROCEDURE — 80048 BASIC METABOLIC PNL TOTAL CA: CPT

## 2023-04-04 PROCEDURE — 97110 THERAPEUTIC EXERCISES: CPT

## 2023-04-04 PROCEDURE — 85025 COMPLETE CBC W/AUTO DIFF WBC: CPT

## 2023-04-04 PROCEDURE — 6360000002 HC RX W HCPCS: Performed by: INTERNAL MEDICINE

## 2023-04-04 PROCEDURE — 6370000000 HC RX 637 (ALT 250 FOR IP): Performed by: NURSE PRACTITIONER

## 2023-04-04 PROCEDURE — 6360000002 HC RX W HCPCS: Performed by: NURSE PRACTITIONER

## 2023-04-04 PROCEDURE — 36415 COLL VENOUS BLD VENIPUNCTURE: CPT

## 2023-04-04 PROCEDURE — 82803 BLOOD GASES ANY COMBINATION: CPT

## 2023-04-04 PROCEDURE — 97116 GAIT TRAINING THERAPY: CPT

## 2023-04-04 PROCEDURE — 94761 N-INVAS EAR/PLS OXIMETRY MLT: CPT

## 2023-04-04 PROCEDURE — 94640 AIRWAY INHALATION TREATMENT: CPT

## 2023-04-04 PROCEDURE — 92526 ORAL FUNCTION THERAPY: CPT

## 2023-04-04 PROCEDURE — 93010 ELECTROCARDIOGRAM REPORT: CPT | Performed by: INTERNAL MEDICINE

## 2023-04-04 PROCEDURE — 2700000000 HC OXYGEN THERAPY PER DAY

## 2023-04-04 RX ORDER — BENZONATATE 100 MG/1
100 CAPSULE ORAL 3 TIMES DAILY PRN
Qty: 20 CAPSULE | Refills: 2
Start: 2023-04-04 | End: 2023-04-11

## 2023-04-04 RX ORDER — SPIRONOLACTONE 50 MG/1
50 TABLET, FILM COATED ORAL DAILY
Status: DISCONTINUED | OUTPATIENT
Start: 2023-04-04 | End: 2023-04-04 | Stop reason: HOSPADM

## 2023-04-04 RX ORDER — HYDRALAZINE HYDROCHLORIDE 20 MG/ML
10 INJECTION INTRAMUSCULAR; INTRAVENOUS ONCE
Status: COMPLETED | OUTPATIENT
Start: 2023-04-04 | End: 2023-04-04

## 2023-04-04 RX ORDER — GUAIFENESIN/DEXTROMETHORPHAN 100-10MG/5
5 SYRUP ORAL EVERY 4 HOURS PRN
Qty: 120 ML | Refills: 0
Start: 2023-04-04 | End: 2023-04-14

## 2023-04-04 RX ORDER — AMOXICILLIN AND CLAVULANATE POTASSIUM 875; 125 MG/1; MG/1
1 TABLET, FILM COATED ORAL EVERY 12 HOURS SCHEDULED
Qty: 9 TABLET | Refills: 0
Start: 2023-04-04 | End: 2023-04-09

## 2023-04-04 RX ADMIN — APIXABAN 5 MG: 5 TABLET, FILM COATED ORAL at 08:13

## 2023-04-04 RX ADMIN — NIFEDIPINE 30 MG: 30 TABLET, EXTENDED RELEASE ORAL at 08:15

## 2023-04-04 RX ADMIN — NITROGLYCERIN 1 INCH: 20 OINTMENT TOPICAL at 07:18

## 2023-04-04 RX ADMIN — LEVOTHYROXINE SODIUM 50 MCG: 50 TABLET ORAL at 07:09

## 2023-04-04 RX ADMIN — DULOXETINE HYDROCHLORIDE 40 MG: 20 CAPSULE, DELAYED RELEASE ORAL at 08:12

## 2023-04-04 RX ADMIN — SPIRONOLACTONE 50 MG: 50 TABLET ORAL at 08:12

## 2023-04-04 RX ADMIN — METOPROLOL TARTRATE 25 MG: 25 TABLET, FILM COATED ORAL at 11:35

## 2023-04-04 RX ADMIN — SODIUM CHLORIDE, PRESERVATIVE FREE 10 ML: 5 INJECTION INTRAVENOUS at 08:15

## 2023-04-04 RX ADMIN — ATORVASTATIN CALCIUM 40 MG: 40 TABLET, FILM COATED ORAL at 08:12

## 2023-04-04 RX ADMIN — ASPIRIN 81 MG 81 MG: 81 TABLET ORAL at 08:13

## 2023-04-04 RX ADMIN — AMOXICILLIN AND CLAVULANATE POTASSIUM 1 TABLET: 875; 125 TABLET, FILM COATED ORAL at 08:12

## 2023-04-04 RX ADMIN — HYDRALAZINE HYDROCHLORIDE 10 MG: 20 INJECTION INTRAMUSCULAR; INTRAVENOUS at 02:45

## 2023-04-04 RX ADMIN — ACETAMINOPHEN 650 MG: 325 TABLET ORAL at 01:11

## 2023-04-04 RX ADMIN — FUROSEMIDE 40 MG: 10 INJECTION, SOLUTION INTRAMUSCULAR; INTRAVENOUS at 08:15

## 2023-04-04 RX ADMIN — BUPRENORPHINE AND NALOXONE 2 TABLET: 2; .5 TABLET SUBLINGUAL at 08:12

## 2023-04-04 RX ADMIN — MOMETASONE FUROATE AND FORMOTEROL FUMARATE DIHYDRATE 2 PUFF: 200; 5 AEROSOL RESPIRATORY (INHALATION) at 09:35

## 2023-04-04 RX ADMIN — TIOTROPIUM BROMIDE INHALATION SPRAY 2 PUFF: 3.12 SPRAY, METERED RESPIRATORY (INHALATION) at 09:35

## 2023-04-04 ASSESSMENT — PAIN - FUNCTIONAL ASSESSMENT
PAIN_FUNCTIONAL_ASSESSMENT: PREVENTS OR INTERFERES SOME ACTIVE ACTIVITIES AND ADLS
PAIN_FUNCTIONAL_ASSESSMENT: PREVENTS OR INTERFERES SOME ACTIVE ACTIVITIES AND ADLS

## 2023-04-04 ASSESSMENT — PAIN DESCRIPTION - DESCRIPTORS
DESCRIPTORS: ACHING;DISCOMFORT;SORE
DESCRIPTORS: DULL;ACHING;DISCOMFORT

## 2023-04-04 ASSESSMENT — PAIN DESCRIPTION - ORIENTATION
ORIENTATION: RIGHT;LEFT
ORIENTATION: RIGHT;LEFT

## 2023-04-04 ASSESSMENT — PAIN DESCRIPTION - LOCATION
LOCATION: HIP
LOCATION: HIP

## 2023-04-04 ASSESSMENT — PAIN DESCRIPTION - PAIN TYPE
TYPE: ACUTE PAIN
TYPE: ACUTE PAIN

## 2023-04-04 ASSESSMENT — PAIN SCALES - GENERAL
PAINLEVEL_OUTOF10: 8
PAINLEVEL_OUTOF10: 8
PAINLEVEL_OUTOF10: 7

## 2023-04-04 ASSESSMENT — PAIN DESCRIPTION - ONSET
ONSET: ON-GOING
ONSET: ON-GOING

## 2023-04-04 ASSESSMENT — PAIN DESCRIPTION - FREQUENCY
FREQUENCY: CONTINUOUS
FREQUENCY: CONTINUOUS

## 2023-04-04 NOTE — PLAN OF CARE
Problem: Safety - Adult  Goal: Free from fall injury  4/4/2023 0938 by Shakeel Valencia RN  Outcome: Progressing  Note: Pt is a High fall risk. See Elizabeth Mari Fall Score and ABCDS Injury Risk assessments.   + High Fall Risk per OCONNELL/ABCDS: Explained fall risk precautions to pt and family and rationale behind their use to keep the patient safe. Pt bed is in low position, side rails up, call light and belongings are in reach. Fall wristband applied and present on pts wrist.  Bed alarm on. Pt encouraged to call for assistance. Will continue with hourly rounds for PO intake, pain needs, toileting and repositioning as needed. Problem: Cardiovascular - Adult  Goal: Absence of cardiac dysrhythmias or at baseline  Outcome: Progressing  Note: Pt experienced bradycardia with HR dropping in the 40s. BP increased and required new PRN medication ordered. Pt asymptomatic at beginning of shift. Pt complained of feeling lightheaded and experiencing dull pain behind eyes at end of shift. Cardiologist on call notified. See flowsheets. Will continue to monitor.
Problem: Safety - Adult  Goal: Free from fall injury  4/4/2023 1908 by Madison Cabrera RN  Outcome: Completed  4/4/2023 0938 by Ysabel Mcdonald RN  Outcome: Progressing  Note: Pt is a High fall risk. See Mirna Gomez Fall Score and ABCDS Injury Risk assessments.   + High Fall Risk per OCONNELL/ABCDS: Explained fall risk precautions to pt and family and rationale behind their use to keep the patient safe. Pt bed is in low position, side rails up, call light and belongings are in reach. Fall wristband applied and present on pts wrist.  Bed alarm on. Pt encouraged to call for assistance. Will continue with hourly rounds for PO intake, pain needs, toileting and repositioning as needed. 4/4/2023 0848 by Madison Cabrera RN  Note: Pt is a high fall risk (see Mirna Gomez Fall Risk assessment). Oriented pt to room and call light. Instructed pt to call for help when needed. Call light and personal items within reach. Bed in lowest position, brakes on, and 2/4 side rails up. Non-skid footwear on. Falls risk stop sign on door; hourly visual checks implemented. Falls risk arm band on pt. Bed alarm is on. Pt using call light appropriately. Will continue to monitor. Problem: ABCDS Injury Assessment  Goal: Absence of physical injury  Outcome: Completed     Problem: Skin/Tissue Integrity  Goal: Absence of new skin breakdown  Description: 1. Monitor for areas of redness and/or skin breakdown  2. Assess vascular access sites hourly  3. Every 4-6 hours minimum:  Change oxygen saturation probe site  4. Every 4-6 hours:  If on nasal continuous positive airway pressure, respiratory therapy assess nares and determine need for appliance change or resting period. 4/4/2023 1908 by Madison Cabrera RN  Outcome: Completed  4/4/2023 0848 by Madison Cabrera RN  Note: Pt up with assistance this shift and using urinals. Pt continent of stool and urine, turns self frequently while in bed.   ADLs and hygiene performed with assistance throughout
Problem: Safety - Adult  Goal: Free from fall injury  Note: Pt is a high fall risk (see Gtz Fall Risk assessment). Oriented pt to room and call light. Instructed pt to call for help when needed. Call light and personal items within reach. Bed in lowest position, brakes on, and 2/4 side rails up. Non-skid footwear on. Falls risk stop sign on door; hourly visual checks implemented. Falls risk arm band on pt. Bed alarm is on. Pt using call light appropriately. Will continue to monitor. Problem: Skin/Tissue Integrity  Goal: Absence of new skin breakdown  Description: 1. Monitor for areas of redness and/or skin breakdown  2. Assess vascular access sites hourly  3. Every 4-6 hours minimum:  Change oxygen saturation probe site  4. Every 4-6 hours:  If on nasal continuous positive airway pressure, respiratory therapy assess nares and determine need for appliance change or resting period. Note: Pt up with assistance this shift and using urinals. Pt continent of stool and urine, turns self frequently while in bed. ADLs and hygiene performed with assistance throughout shift. Instructed pt on importance of maintaining good hygiene and activity levels. No signs or symptoms of new skin breakdown noted.
Problem: Safety - Adult  Goal: Free from fall injury  Outcome: Progressing  Flowsheets (Taken 4/2/2023 0822)  Free From Fall Injury:   Instruct family/caregiver on patient safety   Based on caregiver fall risk screen, instruct family/caregiver to ask for assistance with transferring infant if caregiver noted to have fall risk factors     Problem: ABCDS Injury Assessment  Goal: Absence of physical injury  Outcome: Progressing  Flowsheets (Taken 4/2/2023 1137)  Absence of Physical Injury: Implement safety measures based on patient assessment     Problem: Skin/Tissue Integrity  Goal: Absence of new skin breakdown  Description: 1. Monitor for areas of redness and/or skin breakdown  2. Assess vascular access sites hourly  3. Every 4-6 hours minimum:  Change oxygen saturation probe site  4. Every 4-6 hours:  If on nasal continuous positive airway pressure, respiratory therapy assess nares and determine need for appliance change or resting period.   Outcome: Progressing     Problem: Cardiovascular - Adult  Goal: Maintains optimal cardiac output and hemodynamic stability  Outcome: Progressing     Problem: Metabolic/Fluid and Electrolytes - Adult  Goal: Electrolytes maintained within normal limits  Outcome: Progressing
- Adult  Goal: Maintains optimal cardiac output and hemodynamic stability  4/3/2023 0444 by Sury Renee RN  Outcome: Progressing     Problem: Cardiovascular - Adult  Goal: Absence of cardiac dysrhythmias or at baseline  4/3/2023 0444 by Sury Renee RN  Outcome: Progressing     Problem: Metabolic/Fluid and Electrolytes - Adult  Goal: Electrolytes maintained within normal limits  4/3/2023 0444 by Sury Renee RN  Outcome: Progressing     Problem: Metabolic/Fluid and Electrolytes - Adult  Goal: Hemodynamic stability and optimal renal function maintained  4/3/2023 0444 by Sury Renee RN  Outcome: Progressing
gases  Note: Patient sating 92-95 on 3L O2 NC. SOB better after Lasix. Problem: Cardiovascular - Adult  Goal: Maintains optimal cardiac output and hemodynamic stability  Outcome: Progressing  Flowsheets (Taken 4/2/2023 0027)  Maintains optimal cardiac output and hemodynamic stability:   Monitor blood pressure and heart rate   Monitor urine output and notify Licensed Independent Practitioner for values outside of normal range   Assess for signs of decreased cardiac output  Note: Patient with HR going from 60's to 40's Afib. EKG ordered and MD made aware. See notes.

## 2023-04-04 NOTE — DISCHARGE INSTR - COC
Assisted  Transfer  Assisted  Bathing  Assisted  Dressing  Assisted  Toileting  Independent  Feeding  [address]  Independent  Med Delivery   whole    Wound Care Documentation and Therapy:        Elimination:  Continence: Bowel: Yes  Bladder: Yes  Urinary Catheter: None   Colostomy/Ileostomy/Ileal Conduit: No       Date of Last BM: 04/03/2023    Intake/Output Summary (Last 24 hours) at 4/4/2023 1038  Last data filed at 4/4/2023 0824  Gross per 24 hour   Intake 685 ml   Output 3750 ml   Net -3065 ml     I/O last 3 completed shifts: In: 1110 [P.O.:1045; I.V.:65]  Out: 6250 [Urine:6250]    Safety Concerns: At Risk for Falls    Impairments/Disabilities:      Speech and Hearing    Nutrition Therapy:  Current Nutrition Therapy:   - Oral Diet:  Dysphagia - Soft and Bite Sized    Routes of Feeding: Oral  Liquids: Thin Liquids  Daily Fluid Restriction: no  Last Modified Barium Swallow with Video (Video Swallowing Test): not done    Treatments at the Time of Hospital Discharge:   Respiratory Treatments: Duonebs, inhalers  Oxygen Therapy:  is on oxygen at 4 L/min per nasal cannula.   Ventilator:    - No ventilator support    Rehab Therapies: Physical Therapy, Occupational Therapy, and Speech/Language Therapy  Weight Bearing Status/Restrictions: No weight bearing restrictions  Other Medical Equipment (for information only, NOT a DME order):  wheelchair and walker  Other Treatments:     Patient's personal belongings (please select all that are sent with patient):  Glasses    RN SIGNATURE:  Electronically signed by Pratik Bobo RN on 4/4/23 at 2:55 PM EDT    CASE MANAGEMENT/SOCIAL WORK SECTION    Inpatient Status Date: ***    Readmission Risk Assessment Score:  Readmission Risk              Risk of Unplanned Readmission:  23           Discharging to Facility/ Agency   Name:   Address:  Phone:  Fax:    Dialysis Facility (if applicable)   Name:  Address:  Dialysis Schedule:  Phone:  Fax:    /Social

## 2023-04-04 NOTE — PROGRESS NOTES
1912:  Transport team arrived to take Pt to [facility]. Pt reports feeling ready to leave. Pt reports having all belongings. Copy of discharge instructions given to Pt.   Pt left [place] [id number] in no signs or symptoms of distress accompanied by transport team.
4 Eyes Admission Assessment     I agree as the admission nurse that 2 RN's have performed a thorough Head to Toe Skin Assessment on the patient. ALL assessment sites listed below have been assessed on admission. Areas assessed by both nurses: Cindy and Coisha  [x]   Head, Face, and Ears   [x]   Shoulders, Back, and Chest  [x]   Arms, Elbows, and Hands skin tears on BL Forearms  [x]   Coccyx, Sacrum, and Ischium  [x]   Legs, Feet, and Heels        Does the Patient have Skin Breakdown?   No         Suhail Prevention initiated:  Yes   Wound Care Orders initiated:  No      WOC nurse consulted for Pressure Injury (Stage 3,4, Unstageable, DTI, NWPT, and Complex wounds) or Suhail score 18 or lower:  No      Nurse 1 eSignature: Electronically signed by Eloisa Blandon RN on 4/1/23 at 6:58 PM EDT    **SHARE this note so that the co-signing nurse is able to place an eSignature**    Nurse 2 eSignature: Electronically signed by Enid Garcia RN on 4/1/23 at 7:12 PM EDT
Hospitalist Progress Note      PCP: Elsie Beltran MD    Date of Admission: 4/1/2023    Chief Complaint:  Sent from SNF for abnormal lab      Subjective:     Yesterday had increase sob, especially with laying down, per report had some bradycardia last night. Seems a little less confused he is able to say he uses 3 L at nursing home but was recently increased to 4 L. Medications:  Reviewed    Infusion Medications    dextrose      sodium chloride 25 mL/hr at 04/02/23 1047     Scheduled Medications    cefTRIAXone (ROCEPHIN) IV  1,000 mg IntraVENous Q24H    sodium chloride flush  5-40 mL IntraVENous 2 times per day    aspirin  81 mg Oral Daily    atorvastatin  40 mg Oral Daily    mometasone-formoterol  2 puff Inhalation BID    levothyroxine  50 mcg Oral Daily    metoprolol tartrate  25 mg Oral BID    NIFEdipine  30 mg Oral Daily    buprenorphine-naloxone  2 tablet SubLINGual Daily    DULoxetine  40 mg Oral Daily    furosemide  40 mg IntraVENous BID    apixaban  5 mg Oral BID    tiotropium  2 puff Inhalation Daily    predniSONE  40 mg Oral Daily     PRN Meds: glucose, dextrose bolus **OR** dextrose bolus, glucagon (rDNA), dextrose, sodium chloride flush, sodium chloride, ondansetron **OR** ondansetron, polyethylene glycol, acetaminophen **OR** acetaminophen, albuterol, perflutren lipid microspheres, guaiFENesin-dextromethorphan, benzonatate      Intake/Output Summary (Last 24 hours) at 4/2/2023 1901  Last data filed at 4/2/2023 1707  Gross per 24 hour   Intake 1145 ml   Output 4655 ml   Net -3510 ml       Exam:    BP (!) 146/76   Pulse (!) 114   Temp 98 °F (36.7 °C)   Resp 15   Ht 5' 8\" (1.727 m)   Wt 194 lb 8 oz (88.2 kg)   SpO2 96%   BMI 29.57 kg/m²     General appearance: No apparent distress, appears stated age and cooperative. HEENT: Pupils equal, round, and reactive to light. Conjunctivae/corneas clear. Neck: Supple, with full range of motion. No jugular venous distention.  Trachea
Patient complaining of increasing SOB with labored breathing pattern. Troponin at 1900 is 0.08. He is Afib on tely with occasional PVCs. HR is trending on 40s to 60s. STAT EKG ordered and resulted as A fib with slow ventricular response. MD Tyron Aguila made aware and came to bedside to assess patient. MD ordered to give Lasix IV 40 mg once and to add hold parameters to Lopressor order (hold if HR<50).
Patient oriented to unit policies and procedures including: pain management practices, unit safety precautions, family rapid response, q4h vital signs and assessments, daily 4am lab draws, weekly chest x-rays, daily chlorhexidine bathing, standing transfusion orders, and routine central line care. Also discussed use of call light and how to get in touch with nursing staff. Stressed the importance of calling out immediately for any changes in condition including but not limited to: pain, chills, fever, nausea, vomiting, diarrhea, chest pain, sob/lay, assistance with toileting, bleeding, or any other symptoms that are out of the ordinary for the patient. Patient verbalizes understanding of all instructions and will call for assistance as needed.
Physical Therapy  Facility/Department: [address] [facility] [place]  Daily Treatment Note  NAME: Latia El  : 1955  MRN: [id number]    Date of Service: 2023    Discharge Recommendations:  Patient would benefit from continued therapy after discharge   PT Equipment Recommendations  Equipment Needed: No    Patient Diagnosis(es): The encounter diagnosis was Hyperkalemia. Assessment   Assessment: Pt progressing with mobility & endurance. Pt remains a high fall risk. Pt would benefit from cont inpt PT upon D/C. Will follow per plan of care. Activity Tolerance: Patient limited by endurance; Patient tolerated treatment well  Equipment Needed: No     Plan    Physcial Therapy Plan  General Plan:  (2-5)  Current Treatment Recommendations: Strengthening;Gait training;Functional mobility training; Safety education & training; Endurance training;Transfer training     Restrictions  Position Activity Restriction  Other position/activity restrictions: up with assist     Subjective    Subjective  Subjective: Pt supine in bed & agreeable to PT. Pain: pt denies     Objective      Bed Mobility Training  Bed Mobility Training: Yes  Supine to Sit: Stand-by assistance (HOB elevated)  Scooting: Stand-by assistance (seated)  Balance  Sitting: Intact  Standing:  (static: SBA with RW; dynamic: CGA with RW)  Transfer Training  Sit to Stand: Contact-guard assistance (from bed & chair)  Stand to Sit: Contact-guard assistance  Gait Training  Gait Training: Yes  Gait  Overall Level of Assistance: Contact-guard assistance (pt amb 40 ft x 1, 60 ft x 2 with RW CGA)     PT Exercises  Exercise Treatment: x 15 bilat: LAQ, marching, hip abd, ankle pumps, SLR     Safety Devices  Type of Devices: Call light within reach; Chair alarm in place; Left in chair;Nurse notified       Goals  Short Term Goals  Time Frame for Short Term Goals: Discharge  Short Term Goal 1: Mod I bed mobility  Short Term Goal 2: Transfers with SBA  Short Term Goal 3:
RN notified hospitalist [facility]) of pt's HR dropping into 40s and an increase in BP. RN notified Dr. Roseann Thacker of 12-lead EKG results (A-fib with slow ventricular response). Pt is asymptomatic. Dr instructed RN to continue to monitor.
RN notified on-call cardiology Sasha Anderson NP) of pt's continued fluctuation in HR and elevated BP (see flow sheet). New orders given by phone call with read back.  New order for IV hydralazine and hold AM dose of metoprolol tartrate until pt can be evaluated by cardiology team.
Speech Language Pathology  Facility/Department:[address]   Clinical Swallow Evaluation  Name: Tu Degroot  : 1955  MRN: [id number]    Patient Diagnosis(es):   Patient Active Problem List    Diagnosis Date Noted    Hyperkalemia 2023     No past medical history on file. No past surgical history on file. Reason for Referral:  Tu Degroot  was referred for a Speech Therapy evaluation to assess swallow function and/or communication. History of Present Illness  History Of Present Illness:  23   Jadon Link is a 74 y.o. male with past medical history as below, including COPD, Afib on Eliquis, CKD IV, who presented from SNF for abnormal lab. Patient is a poor historian but he states he had a fall yesterday or the day before. He also had a fall last month but he says he does not know why he had the falls. He thinks he might be sleep walking but he does not recall. He has had some dizziness and he also says he has been more short of breath recently, but he does not know how long for. His shortness of breath is worse with laying down. He does not think he has always been on oxygen but thinks it was started sometime while at SNF. He has occasional cough which he says is his smokers cough. No fever. He has been eating well, good appetite, no n/v or diarrhea. Knows he lives at [facility] something. \"    CT CHEST WO CONTRAST   Final Result      Large bilateral pleural effusions, left greater than right. Airspace density in the lungs bilaterally, particularly in the bilateral lower lobes with associated volume loss. Findings are most consistent with atelectasis, but underlying infiltrates not excluded. CT CERVICAL SPINE WO CONTRAST   Final Result      No acute findings in the cervical spine. Bilateral pleural effusions. CT HEAD WO CONTRAST   Final Result      Cerebral atrophy.       Evidence of mild diffuse chronic small vessel white matter disease bilaterally
(88.2 kg)   03/11/23 211 lb 4.8 oz (95.8 kg)       Admit Wt: Weight: 219 lb 6.4 oz (99.5 kg)   Todays Wt: Weight: 194 lb 8 oz (88.2 kg)    TELEMETRY: Atrial fibrillation     Physical Exam:     General:  Awake, alert, NAD  Skin:  Warm and dry  Neck:  no JVP  Chest:  Clear to auscultation, respiration normal  Cardiovascular:  RRR S1S2  Abdomen:  Soft , non-ditended  Extremities:  no edema      Objective:  Vital signs: (most recent): Blood pressure (!) 158/78, pulse 52, temperature 97.8 °F (36.6 °C), temperature source Oral, resp. rate 17, height 5' 8\" (1.727 m), weight 194 lb 8 oz (88.2 kg), SpO2 99 %. Labs:   Recent Labs     04/01/23  0307 04/01/23  0414 04/01/23  1438 04/02/23  0334 04/03/23 0338   *  --  137 138 141   K 6.6*   < > 4.7 5.6* 5.2*   BUN 43*  --  40* 40* 40*   CREATININE 2.2*  --  1.8* 1.8* 1.4*   CL 99  --  99 98* 98*   CO2 24  --  26 32 35*   GLUCOSE 216*  --  96 183* 126*   CALCIUM 8.9  --  9.3 9.7 9.4   MG 1.90  --   --   --   --     < > = values in this interval not displayed. Recent Labs     04/01/23  1438 04/02/23  0334 04/03/23  0338   WBC 6.6 6.7 9.2   HGB 8.9* 9.5* 10.0*   HCT 28.7* 30.1* 31.7*    309 334   MCV 86.3 85.1 85.4     No results for input(s): CHOLTOT, TRIG, HDL, CHOLHDL, LDL in the last 72 hours. Invalid input(s): Gallito Bruner  No results for input(s): PTT, INR in the last 72 hours. Invalid input(s): PT  Recent Labs     04/01/23  0602 04/01/23  1932 04/02/23  0935 04/02/23  1452   TROPONINI 0.05* 0.08* 0.06* 0.05*     No results for input(s): BNP in the last 72 hours. No results for input(s): NTPROBNP in the last 72 hours. No results for input(s): TSH in the last 72 hours. Imaging:   I personally reviewed imaging studies including CXR, Stress test, TTE/MARY.       Assessment & Plan:     Acute CHF   Hx of CAD w MI, multivsessel      Rate Controlled Atrial Fibrillation   - considering watchman device, so can stop AC   - for now cont Eliquis 5 mg
04/04/23 188 lb 3.2 oz (85.4 kg)   03/11/23 211 lb 4.8 oz (95.8 kg)       Admit Wt: Weight: 219 lb 6.4 oz (99.5 kg)   Todays Wt: Weight: 188 lb 3.2 oz (85.4 kg)    TELEMETRY: Atrial fibrillation     Physical Exam:     General:  Awake, alert, NAD  Skin:  Warm and dry  Neck:  no JVP  Chest:  Clear to auscultation, respiration normal  Cardiovascular:  RRR S1S2  Abdomen:  Soft , non-ditended  Extremities:  no edema      Objective:  Vital signs: (most recent): Blood pressure 137/65, pulse 80, temperature 97.9 °F (36.6 °C), temperature source Oral, resp. rate 16, height 5' 8\" (1.727 m), weight 188 lb 3.2 oz (85.4 kg), SpO2 99 %. Labs:   Recent Labs     04/02/23 0334 04/03/23  0338 04/04/23  0402    141 140   K 5.6* 5.2* 4.1   BUN 40* 40* 41*   CREATININE 1.8* 1.4* 1.3   CL 98* 98* 96*   CO2 32 35* 37*   GLUCOSE 183* 126* 165*   CALCIUM 9.7 9.4 9.2   MG  --   --  1.80       Recent Labs     04/02/23 0334 04/03/23  0338 04/04/23  0402   WBC 6.7 9.2 8.0   HGB 9.5* 10.0* 10.8*   HCT 30.1* 31.7* 33.8*    334 357   MCV 85.1 85.4 84.4       No results for input(s): CHOLTOT, TRIG, HDL, CHOLHDL, LDL in the last 72 hours. Invalid input(s): Acie Moors  No results for input(s): PTT, INR in the last 72 hours. Invalid input(s): PT  Recent Labs     04/01/23  1932 04/02/23  0935 04/02/23  1452   TROPONINI 0.08* 0.06* 0.05*       No results for input(s): BNP in the last 72 hours. No results for input(s): NTPROBNP in the last 72 hours. No results for input(s): TSH in the last 72 hours. Imaging:   I personally reviewed imaging studies including CXR, Stress test, TTE/MARY.       Assessment & Plan:     Acute CHF   Hx of CAD w MI, multivsessel  - cont eliquis  - cont ASA 81  - lopressor 25 bid  -lasix 40 IV BID    Rate Controlled Atrial Fibrillation   - considering watchman device, so can stop AC   - for now cont Eliquis 5 mg po BID   - cont lopressor 25 mg po BID     Pul effusions  - lasix 40 IV
details      ROS:       positives in bold   Constitutional:  fever, chills, weakness, weight change, fatigue  Skin:  rash, pruritus, hair loss, bruising, dry skin, petechiae  Head, Face, Neck   headaches, swelling,  cervical adenopathy  Respiratory: shortness of breath, cough, or wheezing  Cardiovascular: chest pain, palpitations, dizzy, edema  Gastrointestinal: nausea, vomiting, diarrhea, constipation,belly pain    Yellow skin, blood in stool  Musculoskeletal:  back pain, muscle weakness, gait problems,       joint pain or swelling. Genitourinary:  dysuria, poor urine flow, flank pain, blood in urine  Neurologic:  vertigo, TIA'S, syncope, seizures, focal weakness  Psychosocial:  insomnia, anxiety, or depression. Additional positive findings:                    All other remaining systems are negative or unable to obtain        PMH/PSH/SH/Family History:     No past medical history on file. No past surgical history on file. reports that he has quit smoking. His smoking use included cigarettes. He has never used smokeless tobacco. He reports that he does not currently use alcohol. He reports that he does not currently use drugs. family history is not on file.          Medication:     Current Facility-Administered Medications: cefTRIAXone (ROCEPHIN) 1,000 mg in sodium chloride 0.9 % 50 mL IVPB (mini-bag), 1,000 mg, IntraVENous, Q24H  glucose chewable tablet 16 g, 4 tablet, Oral, PRN  dextrose bolus 10% 125 mL, 125 mL, IntraVENous, PRN **OR** dextrose bolus 10% 250 mL, 250 mL, IntraVENous, PRN  glucagon injection 1 mg, 1 mg, SubCUTAneous, PRN  dextrose 10 % infusion, , IntraVENous, Continuous PRN  sodium chloride flush 0.9 % injection 5-40 mL, 5-40 mL, IntraVENous, 2 times per day  sodium chloride flush 0.9 % injection 5-40 mL, 5-40 mL, IntraVENous, PRN  0.9 % sodium chloride infusion, , IntraVENous, PRN  ondansetron (ZOFRAN-ODT) disintegrating tablet 4 mg, 4 mg, Oral, Q8H PRN **OR** ondansetron (ZOFRAN)
home and also was found to have abnormal lab because of which she was sent to the emergency room in the emergency room he had high potassium and high oxygen requirement.  He has had a CT scan of the chest done which showed bilateral pleural effusion he also has edema.  He is started on diuretics.  He has chronic kidney disease but denies being seen by nephrologist as an outpatient.  He is pleasant but unable to provide great details      ROS:     Seen with- RN     positives in bold   Constitutional:  fever, chills, weakness, weight change, fatigue  Skin:  rash, pruritus, hair loss, bruising, dry skin, petechiae  Head, Face, Neck   headaches, swelling,  cervical adenopathy  Respiratory: shortness of breath, cough, or wheezing  Cardiovascular: chest pain, palpitations, dizzy, edema  Gastrointestinal: nausea, vomiting, diarrhea, constipation,belly pain    Yellow skin, blood in stool  Musculoskeletal:  back pain, muscle weakness, gait problems,       joint pain or swelling.  Genitourinary:  dysuria, poor urine flow, flank pain, blood in urine  Neurologic:  vertigo, TIA'S, syncope, seizures, focal weakness  Psychosocial:  insomnia, anxiety, or depression.  Additional positive findings:                    All other remaining systems are negative or unable to obtain        PMH/PSH/SH/Family History:     No past medical history on file.    No past surgical history on file.     reports that he has quit smoking. His smoking use included cigarettes. He has never used smokeless tobacco. He reports that he does not currently use alcohol. He reports that he does not currently use drugs.    family history is not on file.         Medication:     Current Facility-Administered Medications: sodium zirconium cyclosilicate (LOKELMA) oral suspension 5 g, 5 g, Oral, TID  glucose chewable tablet 16 g, 4 tablet, Oral, PRN  dextrose bolus 10% 125 mL, 125 mL, IntraVENous, PRN **OR** dextrose bolus 10% 250 mL, 250 mL, IntraVENous, 
notified, Left in bed     Restrictions  Position Activity Restriction  Other position/activity restrictions: up with assist     Subjective   General  Additional Pertinent Hx: Adm 4/1 from SNF with abnormal labs. , x-ray chest showed bilateral pleural effusion. Referring Practitioner: Jean-Paul Rossi  Diagnosis: KARINA/fluid overload  Subjective  Subjective: Pt in bed upon PT entry, agreeable to working with PT, no complaint of pain noted  805 am         Social/Functional History  Social/Functional History  Type of Home: Facility  Home Layout: One level  Home Access: Level entry  Bathroom Shower/Tub: Tub/Shower unit  Bathroom Toilet: Standard  Bathroom Equipment: Grab bars in shower, Shower chair, Hand-held shower, Grab bars around toilet  Home Equipment: Oxygen (3-4L)  Has the patient had two or more falls in the past year or any fall with injury in the past year?: Yes  ADL Assistance: Needs assistance (sponge bathes at sink. Reporting dresses self)  Ambulation Assistance: Independent (reporting does not amulate alone)  Transfer Assistance: Independent (assist getting to [facility] and has assist to wheel to bathroom, transfers self to toilet.)  Active : No  Additional Comments: Pt stating lives at [facility] home\" facility plans to return at [place]. Pt reporting does not walk very much because he falls. Reporting he might be getting occupational therapy at facility.   Vision/Hearing  Vision  Vision: Impaired  Vision Exceptions: Wears glasses at all times  Hearing  Hearing: Exceptions to [facility]   Orientation  Overall Orientation Status: Within Functional Limits  Orientation Level: Disoriented to time;Oriented to situation;Oriented to person;Oriented to place     Objective   Heart Rate: 52  Heart Rate Source: Telemetry  BP: (!) 158/78  BP Location: Left upper arm  BP Method: Automatic  Patient Position: Semi fowlers  MAP (Calculated): 105  Resp: 17  SpO2: 99 %  O2 Device: Nasal cannula              AROM RLE
BUN 40* 40* 40* 41*   CREATININE 1.8* 1.8* 1.4* 1.3   CALCIUM 9.3 9.7 9.4 9.2   PHOS 3.5  --   --   --        No results for input(s): AST, ALT, BILIDIR, BILITOT, ALKPHOS in the last 72 hours. No results for input(s): INR in the last 72 hours. Recent Labs     04/01/23  1932 04/02/23  0935 04/02/23  1452   TROPONINI 0.08* 0.06* 0.05*         Studies:  CT CHEST WO CONTRAST   Final Result      Large bilateral pleural effusions, left greater than right. Airspace density in the lungs bilaterally, particularly in the bilateral lower lobes with associated volume loss. Findings are most consistent with atelectasis, but underlying infiltrates not excluded. CT CERVICAL SPINE WO CONTRAST   Final Result      No acute findings in the cervical spine. Bilateral pleural effusions. CT HEAD WO CONTRAST   Final Result      Cerebral atrophy. Evidence of mild diffuse chronic small vessel white matter disease bilaterally      No acute intracranial findings. XR CHEST PORTABLE   Final Result      Increased airspace density in the mid to lower lung zones bilaterally, with linear bands of density consistent with subsegmental atelectasis. Findings may be consistent with congestive failure or pneumonia. Correlate clinically. Small bilateral effusions. Assessment/Plan:       Acute CHF with preserved EF: POA  CAD with hx of MI, multivessel; previous coronary stent   History of LV aneurysm  Troponin elevation likely NSTEMI  prob precipitated by demand, AF with RVR, possibly hypoxia  as well as sec to renal disease    No chest pain   BNP was- > 11,000 on 4/1  CT large pleural Effusions; prob sec to CHF  Treated with lasix IV 40 mg BID   - Reviewed echo from 11/20[facility] from [facility]: EF 50-60%, no apparent major issue noted. Cardiology following: Per cardiology, no need for angiography at this time  - Repeat Echocardiogram: EF 60-65%; moderate hypokinesis of the mid, distal walls and apex.
PRN  glucagon injection 1 mg, 1 mg, SubCUTAneous, PRN  dextrose 10 % infusion, , IntraVENous, Continuous PRN  amoxicillin-clavulanate (AUGMENTIN) 875-125 MG per tablet 1 tablet, 1 tablet, Oral, 2 times per day  insulin lispro (1 Unit Dial) (HUMALOG/ADMELOG) pen 0-8 Units, 0-8 Units, SubCUTAneous, TID WC  insulin lispro (1 Unit Dial) (HUMALOG/ADMELOG) pen 0-4 Units, 0-4 Units, SubCUTAneous, Nightly  dextrose 10 % infusion, , IntraVENous, Continuous PRN  sodium chloride flush 0.9 % injection 5-40 mL, 5-40 mL, IntraVENous, 2 times per day  sodium chloride flush 0.9 % injection 5-40 mL, 5-40 mL, IntraVENous, PRN  0.9 % sodium chloride infusion, , IntraVENous, PRN  ondansetron (ZOFRAN-ODT) disintegrating tablet 4 mg, 4 mg, Oral, Q8H PRN **OR** ondansetron (ZOFRAN) injection 4 mg, 4 mg, IntraVENous, Q6H PRN  polyethylene glycol (GLYCOLAX) packet 17 g, 17 g, Oral, Daily PRN  acetaminophen (TYLENOL) tablet 650 mg, 650 mg, Oral, Q6H PRN **OR** acetaminophen (TYLENOL) suppository 650 mg, 650 mg, Rectal, Q6H PRN  albuterol (PROVENTIL) nebulizer solution 2.5 mg, 2.5 mg, Nebulization, Q6H PRN  aspirin chewable tablet 81 mg, 81 mg, Oral, Daily  atorvastatin (LIPITOR) tablet 40 mg, 40 mg, Oral, Daily  mometasone-formoterol (DULERA) 200-5 MCG/ACT inhaler 2 puff, 2 puff, Inhalation, BID  levothyroxine (SYNTHROID) tablet 50 mcg, 50 mcg, Oral, Daily  [Held by provider] metoprolol tartrate (LOPRESSOR) tablet 25 mg, 25 mg, Oral, BID  NIFEdipine (ADALAT CC) extended release tablet 30 mg, 30 mg, Oral, Daily  buprenorphine-naloxone (SUBOXONE) 2-0.5 MG SL tablet 2 tablet, 2 tablet, SubLINGual, Daily  DULoxetine (CYMBALTA) extended release capsule 40 mg, 40 mg, Oral, Daily  furosemide (LASIX) injection 40 mg, 40 mg, IntraVENous, BID  apixaban (ELIQUIS) tablet 5 mg, 5 mg, Oral, BID  tiotropium (SPIRIVA RESPIMAT) 2.5 MCG/ACT inhaler 2 puff, 2 puff, Inhalation, Daily  guaiFENesin-dextromethorphan (ROBITUSSIN DM) 100-10 MG/5ML syrup 5 mL, 5 mL,
mild oropharyngeal dysphagia. RN reports pt with strong reactive coughing event while drinking a Pop yesterday. Per [facility] staff, pt was on Mechanical Soft diet texture with Thin Liquids while at their facility. Pt verbalized recall of \"some tests\" for his swallowing and reports that they told him to eat foods that are cut into small pieces (with f/u questioning, appears to correlate to Soft and Bite-Sized); pt reports that he lost his dentures but this does not interfere with his ability to masticate self-selected foods. Pt demonstrates adequate labial seal, slightly prolonged mastication, and no significant oral residue. Laryngeal elevation appears adequate and pt has no overt clinical s/s aspiration with any consistency attempted at this time. Recommend Dysphagia Soft and Bite-Sized diet texture with Thin Liquids. Recommend ST f/u 3-5x/week xLOS as indicated. Prognosis:  Prognosis for improvement: good  Barriers to reach goals: cognition  Consulted and agree with results and recommendations: pt, RN    Treatment:  Dysphagia Goals: The pt will be seen  3-5x/week xLOS as indicated to address the following goals:  1. Patient will tolerate least restrictive diet with no clinical s/s of aspiration, using strategies independently as needed. 4/4/23:  Pt tolerates PO trials of Dysphagia Soft and Bite-Sized texture with apparent and reported adequate oral management/clearance. No clinical s/s aspiration. Pt tolerates thin liquids via cup with no overt clinical s/s aspiration. RN indicates no concerns with diet tolerance. Per chart, pt is afebrile and WBC values are within range. Goal met, discontinue. 2.  Patient/caregiver will demonstrate understanding of swallow concerns and recommendations with min cues.   4/4/23:  SLP educated pt re: role of SLP, strategies to facilitate safe swallow, s/s aspiration to report, risks of aspiration, and diet/POC recommendations; pt verbalized and
Demonstration;Verbal  Barriers to Learning: Cognition  Education Outcome: Continued education needed                            AM-PAC Score        AM-PAC Inpatient Daily Activity Raw Score: 15 (04/03/23 1144)  AM-PAC Inpatient ADL T-Scale Score : 34.69 (04/03/23 1144)  ADL Inpatient CMS 0-100% Score: 56.46 (04/03/23 1144)  ADL Inpatient CMS G-Code Modifier : CK (04/03/23 1144)        Goals  Short Term Goals  Time Frame for Short Term Goals: dc  Short Term Goal 1: supine to sit with SPVN  Short Term Goal 2: sit<>stands with SBA  Short Term Goal 3: Fx mobility with SBA  Short Term Goal 4: UB/LB dressing with SPVN  Short Term Goal 5: grooming with SPVN in stance       Therapy Time   Individual Concurrent Group Co-treatment   Time In 0808         Time Out 0847         Minutes 39             Timed Code Treatment Minutes:   24    Total [facility] Minutes:  [facility]
bronchodilators      Peripheral vascular disease  GERD  Moderate malnutrition  HLD      DVT Prophylaxis: Eliquis  Diet: ADULT DIET; Dysphagia - Soft and Bite Sized; Low Fat/Low Chol/High Fiber/2 gm Na;  Low Potassium (Less than 3000 mg/day)  Code Status: Full Code    PT/OT Eval Status:     Disposition -   - Pxt is from [address] [facility] can return to SNF at discharge  Can likely DC in about 1-2 days      Skylar Clancy MD

## 2023-04-04 NOTE — CARE COORDINATION
FE Following, Pt from [facility], Pt has recs to return to SNF level of care. FE called Manuel Page [phone] to confirm that Pt is from [facility] and can potentially return. SW LVM and is waiting on a call back. Pt has an ECHO pending and is on IV Lasix, anticipated DC is in 2-3 days.     Electronically signed by LAM Figueroa, JUDY on 4/3/2023 at 2:09 PM   [phone]
SW Following, FE called Rivka Sandoval [phone] w/Ted Garcia to follow up on possible return to SNF @ DC. FE LVM and is waiting on a call back. Electronically signed by LAM Mustafa LSW on 4/4/2023 at 12:54 PM   [phone]    UPDATE: FE received an update that [facility] has a new liaison @ [phone]. FE contacted the liaison and they identified that they can take the Pt at [facility] when they are ready for DC.      Electronically signed by LAM Mustafa LSW on 4/4/2023 at 2:20 PM   [phone]
Applicable    IMM Completed:   No         Transportation:  Transportation PLAN for discharge: EMS transportation   Mode of Transport: Ambulance stretcher - BLS  Reason for medical transport: Bed confined: Meets the following criteria 1) unable to get out of bed without assistance or ambulate, 2) unable to safely sit up in a wheelchair, 3) unable to maintain erect seating position in a chair for time needed for transport  Name of Transport Company: [employer]: [phone]  Time of Transport: 7:30    Transport form completed: Yes    Home Care:  [address] ordered at discharge: No  [address]: Not Applicable  Orders faxed: No    Durable Medical Equipment:  DME Provider: None  Equipment obtained during hospitalization:     Home Oxygen and Respiratory Equipment:  Oxygen needed at discharge?: Yes  [address]: Not Applicable  Portable tank available for discharge?: Not Indicated    Dialysis:  Dialysis patient: No    Dialysis Center:  Not Applicable    Additional CM Notes: Pt came from [facility] and will return at [place]. SW spoke w/admissions [phone] and confirmed JESSICA. COVID Result:  No results found for: COVID19    The Plan for Transition of Care is related to the following treatment goals of Hyperkalemia [E87.5]    The Patient and/or patient representative Daniel and his family were provided with a choice of provider and agrees with the discharge plan Yes    Freedom of choice list was provided with basic dialogue that supports the patient's individualized plan of care/goals and shares the quality data associated with the providers.  Yes    Care Transitions patient: No    LAM Ortega, [facility]  Case Management Department  Ph: [phone]  Fax:

## 2023-04-04 NOTE — DISCHARGE SUMMARY
line)  Qty: 120 mL, Refills: 0      Torsemide 60 MG TABS Take 1 tablet by mouth daily  Qty: 30 tablet, Refills: 2                Details   acetaminophen (TYLENOL) 500 MG tablet Take 500 mg by mouth every 6 hours as needed for Pain      albuterol sulfate HFA (PROVENTIL;VENTOLIN;PROAIR) 108 (90 Base) MCG/ACT inhaler Inhale 2 puffs into the lungs every 6 hours as needed for Wheezing      apixaban (ELIQUIS) 5 MG TABS tablet Take by mouth 2 times daily      aspirin 81 MG chewable tablet Take 81 mg by mouth daily      atorvastatin (LIPITOR) 40 MG tablet Take 40 mg by mouth daily      azithromycin (ZITHROMAX) 250 MG tablet Take 250 mg by mouth daily      buprenorphine-naloxone (SUBOXONE) 4-1 MG FILM SL film Place 1 Film under the tongue daily. DULoxetine HCl 40 MG CPEP Take by mouth      ferrous sulfate (IRON 325) 325 (65 Fe) MG tablet Take 325 mg by mouth daily (with breakfast)      Fluticasone Furoate 50 MCG/ACT AEPB Inhale into the lungs      gabapentin (NEURONTIN) 100 MG capsule Take 100 mg by mouth 3 times daily.       hydrOXYzine HCl (ATARAX) 25 MG tablet Take 25 mg by mouth 3 times daily as needed for Itching      ipratropium-albuterol (DUONEB) 0.5-2.5 (3) MG/3ML SOLN nebulizer solution Inhale 1 vial into the lungs every 4 hours      levothyroxine (SYNTHROID) 50 MCG tablet Take 50 mcg by mouth Daily      melatonin 3 MG TABS tablet Take 3 mg by mouth daily      metoprolol tartrate (LOPRESSOR) 25 MG tablet Take 25 mg by mouth 2 times daily      naloxone 4 MG/0.1ML LIQD nasal spray 1 spray by Nasal route as needed for Opioid Reversal      nicotine (NICODERM CQ) 7 MG/24HR Place 1 patch onto the skin every 24 hours      NIFEdipine (PROCARDIA XL) 30 MG extended release tablet Take 30 mg by mouth daily      omeprazole (PRILOSEC) 40 MG delayed release capsule Take 40 mg by mouth daily      tiotropium (SPIRIVA) 18 MCG inhalation capsule Inhale 18 mcg into the lungs daily      traZODone (DESYREL) 50 MG tablet Take 50 mg

## 2023-06-01 PROBLEM — I50.33 ACUTE ON CHRONIC HEART FAILURE WITH NORMAL EJECTION FRACTION (HCC): Status: ACTIVE | Noted: 2023-06-01

## 2023-06-01 PROBLEM — S52.124A CLOSED NONDISPLACED FRACTURE OF HEAD OF RIGHT RADIUS: Status: ACTIVE | Noted: 2023-06-01

## 2023-06-05 PROBLEM — I50.9 ACUTE ON CHRONIC CONGESTIVE HEART FAILURE (HCC): Status: ACTIVE | Noted: 2023-06-01

## 2023-06-06 PROBLEM — S80.11XA LEG HEMATOMA, RIGHT, INITIAL ENCOUNTER: Status: ACTIVE | Noted: 2023-06-06

## 2023-06-06 PROBLEM — Z79.01 CHRONIC ANTICOAGULATION: Status: ACTIVE | Noted: 2023-06-06

## 2023-06-06 PROBLEM — E55.9 VITAMIN D DEFICIENCY: Status: ACTIVE | Noted: 2023-06-06

## 2023-06-06 PROBLEM — N18.4 CHRONIC KIDNEY DISEASE (CKD), STAGE IV (SEVERE) (HCC): Status: ACTIVE | Noted: 2023-06-06

## 2023-06-13 PROBLEM — I50.33 ACUTE ON CHRONIC HEART FAILURE WITH NORMAL EJECTION FRACTION (HCC): Status: ACTIVE | Noted: 2023-06-13

## 2023-06-14 ENCOUNTER — TELEPHONE (OUTPATIENT)
Dept: CARDIOLOGY CLINIC | Age: 68
End: 2023-06-14

## 2023-06-19 NOTE — TELEPHONE ENCOUNTER
Called kolby Smith  asking for return call to schedule New Pt appt with Delgado Taveras for Damari consult.   Please transfer call to Alia in FF.

## 2023-06-20 NOTE — TELEPHONE ENCOUNTER
Spoke with Shalini Neal, pt is scheduled on 7-28 at 9am with Dr. Amy Monahan at [facility] for [facility] consult.

## 2023-06-21 ENCOUNTER — TELEPHONE (OUTPATIENT)
Dept: CARDIOLOGY CLINIC | Age: 68
End: 2023-06-21

## 2023-06-21 ENCOUNTER — OFFICE VISIT (OUTPATIENT)
Dept: CARDIOLOGY CLINIC | Age: 68
End: 2023-06-21
Payer: MEDICARE

## 2023-06-21 VITALS
HEART RATE: 84 BPM | HEIGHT: 68 IN | SYSTOLIC BLOOD PRESSURE: 106 MMHG | DIASTOLIC BLOOD PRESSURE: 50 MMHG | WEIGHT: 195 LBS | BODY MASS INDEX: 29.55 KG/M2

## 2023-06-21 DIAGNOSIS — N18.4 CHRONIC KIDNEY DISEASE (CKD), STAGE IV (SEVERE) (HCC): Primary | ICD-10-CM

## 2023-06-21 DIAGNOSIS — Z79.01 CHRONIC ANTICOAGULATION: ICD-10-CM

## 2023-06-21 PROCEDURE — 3017F COLORECTAL CA SCREEN DOC REV: CPT | Performed by: NURSE PRACTITIONER

## 2023-06-21 PROCEDURE — G8427 DOCREV CUR MEDS BY ELIG CLIN: HCPCS | Performed by: NURSE PRACTITIONER

## 2023-06-21 PROCEDURE — 1111F DSCHRG MED/CURRENT MED MERGE: CPT | Performed by: NURSE PRACTITIONER

## 2023-06-21 PROCEDURE — G8419 CALC BMI OUT NRM PARAM NOF/U: HCPCS | Performed by: NURSE PRACTITIONER

## 2023-06-21 PROCEDURE — 99214 OFFICE O/P EST MOD 30 MIN: CPT | Performed by: NURSE PRACTITIONER

## 2023-06-21 PROCEDURE — 1036F TOBACCO NON-USER: CPT | Performed by: NURSE PRACTITIONER

## 2023-06-21 PROCEDURE — 1123F ACP DISCUSS/DSCN MKR DOCD: CPT | Performed by: NURSE PRACTITIONER

## 2023-06-21 NOTE — TELEPHONE ENCOUNTER
Patient scheduled for [facility] consult with Dr. Sabine Nascimento on 7/28/2023 at 9 AM

## 2023-06-21 NOTE — PROGRESS NOTES
[facility]   Cardiology  Note   Dr Keith Hayden MD, Gerard Alexis RN, FNP APRN CVNP  Date: 6/21/2023         Following cardiology today for: afib dHF AoC trop//orthopnea/sob  CXR with large pleural effusions. Recently in hosp with fall    Abnormal stress test and LHC no interventions   No complications for LHC at groin area    PMH dHF AF HTN COPD hx drug abuse states on suboxone       Interval Hx /  Subjective: Today, he is resting quietly   No new complaints today. No major events overnight.   sCr 2.2 stable / right groin no complications   Discussed meds diet activity diet    Ok to discharge and fu with me in 1-2 weeks     Lexiscan suggestive of ischemia/ scar. / LHC completed   LHC 6/12/2023  Significant diffuse atherosclerotic plaque with calcifications. We did not find lesions that needed or interventions. There were some's very small side branches that were too small for interventions but may cause an ischemia. Medical management. Recommendations: No percutaneous intervention at this time. Medical management seems to be appropriate. Optimize lipid management. Nitroglycerin. Isosorbide. Continue his Synthroid at 50 mcg. The LDL was 48 on Lipitor currently 40 mg. We will continue. The creatinine was 2.2. We will give some IV fluids and recheck it tomorrow morning. Medical Decision Making:  Discussion of patient care with other providers  Patient seen and examined. Clinical notes reviewed.  Telemetry reviewed / Pertinent labs, diagnostic, device, and imaging results reviewed as a part of this visit  I spent a total of 50 minutes and greater than 50% of the time was spent counseling with patient  coordinating care regarding her diagnosis, treatments and plan of care    Scheduled Meds:      Vitals:    06/21/23 1302   BP: (!) 106/50   Pulse: 84      In: 710 [P.O.:710]  Out: 1375    Wt Readings from Last 3 Encounters:   06/21/23 195 lb (88.5 kg)   06/13/23 186 lb 4.6 oz (84.5 kg)

## 2023-06-29 ENCOUNTER — APPOINTMENT (OUTPATIENT)
Dept: CT IMAGING | Age: 68
End: 2023-06-29
Payer: MEDICARE

## 2023-06-29 ENCOUNTER — HOSPITAL ENCOUNTER (INPATIENT)
Age: 68
LOS: 2 days | Discharge: NURSING, REHAB OR LONG-TERM CARE | End: 2023-07-01
Attending: EMERGENCY MEDICINE | Admitting: INTERNAL MEDICINE
Payer: MEDICARE

## 2023-06-29 ENCOUNTER — TELEPHONE (OUTPATIENT)
Dept: OTHER | Facility: CLINIC | Age: 68
End: 2023-06-29

## 2023-06-29 ENCOUNTER — APPOINTMENT (OUTPATIENT)
Dept: GENERAL RADIOLOGY | Age: 68
End: 2023-06-29
Payer: MEDICARE

## 2023-06-29 DIAGNOSIS — R07.9 CHEST PAIN, UNSPECIFIED TYPE: Primary | ICD-10-CM
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
## 2023-06-29 PROBLEM — I50.31 CHF (CONGESTIVE HEART FAILURE), NYHA CLASS I, ACUTE, DIASTOLIC (HCC): Status: ACTIVE | Noted: 2023-06-29

## 2023-06-29 LAB
ANION GAP SERPL CALCULATED.3IONS-SCNC: 5 MMOL/L (ref 3–16)
BASE EXCESS BLDV CALC-SCNC: 5.1 MMOL/L (ref -2–3)
BASE EXCESS BLDV CALC-SCNC: 6.9 MMOL/L (ref -2–3)
BASOPHILS # BLD: 0.1 K/UL (ref 0–0.2)
BASOPHILS NFR BLD: 1 %
BUN SERPL-MCNC: 34 MG/DL (ref 7–20)
CALCIUM SERPL-MCNC: 8.8 MG/DL (ref 8.3–10.6)
CHLORIDE SERPL-SCNC: 100 MMOL/L (ref 99–110)
CO2 BLDV-SCNC: 36 MMOL/L
CO2 BLDV-SCNC: 38 MMOL/L
CO2 SERPL-SCNC: 32 MMOL/L (ref 21–32)
COHGB MFR BLDV: 1.3 % (ref 0–1.5)
COHGB MFR BLDV: 1.7 % (ref 0–1.5)
CREAT SERPL-MCNC: 1.8 MG/DL (ref 0.8–1.3)
DEPRECATED RDW RBC AUTO: 17.4 % (ref 12.4–15.4)
DO-HGB MFR BLDV: 59.5 %
DO-HGB MFR BLDV: 63.1 %
EKG ATRIAL RATE: 375 BPM
EKG DIAGNOSIS: NORMAL
EKG Q-T INTERVAL: 424 MS
EKG QRS DURATION: 96 MS
EKG QTC CALCULATION (BAZETT): 412 MS
EKG R AXIS: 30 DEGREES
EKG T AXIS: 61 DEGREES
EKG VENTRICULAR RATE: 57 BPM
EOSINOPHIL # BLD: 0.3 K/UL (ref 0–0.6)
EOSINOPHIL NFR BLD: 5.3 %
GFR SERPLBLD CREATININE-BSD FMLA CKD-EPI: 41 ML/MIN/{1.73_M2}
GLUCOSE SERPL-MCNC: 150 MG/DL (ref 70–99)
HCO3 BLDV-SCNC: 33.7 MMOL/L (ref 24–28)
HCO3 BLDV-SCNC: 35.6 MMOL/L (ref 24–28)
HCT VFR BLD AUTO: 28.6 % (ref 40.5–52.5)
HGB BLD-MCNC: 9.1 G/DL (ref 13.5–17.5)
LYMPHOCYTES # BLD: 0.7 K/UL (ref 1–5.1)
LYMPHOCYTES NFR BLD: 10.8 %
MCH RBC QN AUTO: 27 PG (ref 26–34)
MCHC RBC AUTO-ENTMCNC: 31.7 G/DL (ref 31–36)
MCV RBC AUTO: 85.3 FL (ref 80–100)
METHGB MFR BLDV: 0.2 % (ref 0–1.5)
METHGB MFR BLDV: 0.6 % (ref 0–1.5)
MONOCYTES # BLD: 0.6 K/UL (ref 0–1.3)
MONOCYTES NFR BLD: 8.8 %
NEUTROPHILS # BLD: 4.7 K/UL (ref 1.7–7.7)
NEUTROPHILS NFR BLD: 74.1 %
NT-PROBNP SERPL-MCNC: 6531 PG/ML (ref 0–124)
PCO2 BLDV: 77.9 MMHG (ref 41–51)
PCO2 BLDV: 79.1 MMHG (ref 41–51)
PH BLDV: 7.25 [PH] (ref 7.35–7.45)
PH BLDV: 7.26 [PH] (ref 7.35–7.45)
PLATELET # BLD AUTO: 236 K/UL (ref 135–450)
PMV BLD AUTO: 9.3 FL (ref 5–10.5)
PO2 BLDV: 30.1 MMHG (ref 25–40)
PO2 BLDV: <30 MMHG (ref 25–40)
POTASSIUM SERPL-SCNC: 5.1 MMOL/L (ref 3.5–5.1)
RBC # BLD AUTO: 3.35 M/UL (ref 4.2–5.9)
SAO2 % BLDV: 36 %
SAO2 % BLDV: 39 %
SODIUM SERPL-SCNC: 137 MMOL/L (ref 136–145)
TROPONIN, HIGH SENSITIVITY: 76 NG/L (ref 0–22)
TROPONIN, HIGH SENSITIVITY: 76 NG/L (ref 0–22)
WBC # BLD AUTO: 6.4 K/UL (ref 4–11)

## 2023-06-29 PROCEDURE — 99285 EMERGENCY DEPT VISIT HI MDM: CPT

## 2023-06-29 PROCEDURE — 94640 AIRWAY INHALATION TREATMENT: CPT

## 2023-06-29 PROCEDURE — 96374 THER/PROPH/DIAG INJ IV PUSH: CPT

## 2023-06-29 PROCEDURE — 6360000002 HC RX W HCPCS: Performed by: INTERNAL MEDICINE

## 2023-06-29 PROCEDURE — 84484 ASSAY OF TROPONIN QUANT: CPT

## 2023-06-29 PROCEDURE — 6360000002 HC RX W HCPCS: Performed by: NURSE PRACTITIONER

## 2023-06-29 PROCEDURE — 71046 X-RAY EXAM CHEST 2 VIEWS: CPT

## 2023-06-29 PROCEDURE — 83880 ASSAY OF NATRIURETIC PEPTIDE: CPT

## 2023-06-29 PROCEDURE — 85025 COMPLETE CBC W/AUTO DIFF WBC: CPT

## 2023-06-29 PROCEDURE — 2580000003 HC RX 258: Performed by: INTERNAL MEDICINE

## 2023-06-29 PROCEDURE — 94761 N-INVAS EAR/PLS OXIMETRY MLT: CPT

## 2023-06-29 PROCEDURE — 1200000000 HC SEMI PRIVATE

## 2023-06-29 PROCEDURE — 96375 TX/PRO/DX INJ NEW DRUG ADDON: CPT

## 2023-06-29 PROCEDURE — 6370000000 HC RX 637 (ALT 250 FOR IP): Performed by: INTERNAL MEDICINE

## 2023-06-29 PROCEDURE — 80048 BASIC METABOLIC PNL TOTAL CA: CPT

## 2023-06-29 PROCEDURE — 70450 CT HEAD/BRAIN W/O DYE: CPT

## 2023-06-29 PROCEDURE — 71101 X-RAY EXAM UNILAT RIBS/CHEST: CPT

## 2023-06-29 PROCEDURE — 82803 BLOOD GASES ANY COMBINATION: CPT

## 2023-06-29 PROCEDURE — 93005 ELECTROCARDIOGRAM TRACING: CPT | Performed by: NURSE PRACTITIONER

## 2023-06-29 PROCEDURE — 2700000000 HC OXYGEN THERAPY PER DAY

## 2023-06-29 RX ORDER — MORPHINE SULFATE 2 MG/ML
4 INJECTION, SOLUTION INTRAMUSCULAR; INTRAVENOUS EVERY 4 HOURS PRN
Status: DISCONTINUED | OUTPATIENT
Start: 2023-06-29 | End: 2023-06-30

## 2023-06-29 RX ORDER — TORSEMIDE 20 MG/1
40 TABLET ORAL 2 TIMES DAILY
Status: DISCONTINUED | OUTPATIENT
Start: 2023-06-30 | End: 2023-07-01 | Stop reason: HOSPADM

## 2023-06-29 RX ORDER — SODIUM CHLORIDE 0.9 % (FLUSH) 0.9 %
5-40 SYRINGE (ML) INJECTION PRN
Status: DISCONTINUED | OUTPATIENT
Start: 2023-06-29 | End: 2023-07-01 | Stop reason: HOSPADM

## 2023-06-29 RX ORDER — ASPIRIN 81 MG/1
81 TABLET, CHEWABLE ORAL DAILY
Status: DISCONTINUED | OUTPATIENT
Start: 2023-06-30 | End: 2023-07-01 | Stop reason: HOSPADM

## 2023-06-29 RX ORDER — ALBUTEROL SULFATE 2.5 MG/3ML
2.5 SOLUTION RESPIRATORY (INHALATION) EVERY 6 HOURS PRN
Status: DISCONTINUED | OUTPATIENT
Start: 2023-06-29 | End: 2023-07-01 | Stop reason: HOSPADM

## 2023-06-29 RX ORDER — DULOXETIN HYDROCHLORIDE 20 MG/1
40 CAPSULE, DELAYED RELEASE ORAL DAILY
Status: DISCONTINUED | OUTPATIENT
Start: 2023-06-30 | End: 2023-07-01 | Stop reason: HOSPADM

## 2023-06-29 RX ORDER — ALBUTEROL SULFATE 2.5 MG/3ML
SOLUTION RESPIRATORY (INHALATION)
Status: DISPENSED
Start: 2023-06-29 | End: 2023-06-30

## 2023-06-29 RX ORDER — ATORVASTATIN CALCIUM 40 MG/1
40 TABLET, FILM COATED ORAL EVERY EVENING
Status: DISCONTINUED | OUTPATIENT
Start: 2023-06-29 | End: 2023-07-01 | Stop reason: HOSPADM

## 2023-06-29 RX ORDER — FLUTICASONE PROPIONATE 50 MCG
2 SPRAY, SUSPENSION (ML) NASAL DAILY
Status: DISCONTINUED | OUTPATIENT
Start: 2023-06-30 | End: 2023-07-01 | Stop reason: HOSPADM

## 2023-06-29 RX ORDER — MORPHINE SULFATE 4 MG/ML
4 INJECTION INTRAVENOUS ONCE
Status: COMPLETED | OUTPATIENT
Start: 2023-06-29 | End: 2023-06-29

## 2023-06-29 RX ORDER — DIMETHICONE 30 G/G
LOTION TOPICAL 2 TIMES DAILY PRN
COMMUNITY

## 2023-06-29 RX ORDER — POLYETHYLENE GLYCOL 3350 17 G/17G
17 POWDER, FOR SOLUTION ORAL DAILY PRN
Status: DISCONTINUED | OUTPATIENT
Start: 2023-06-29 | End: 2023-07-01 | Stop reason: HOSPADM

## 2023-06-29 RX ORDER — GABAPENTIN 300 MG/1
300 CAPSULE ORAL 2 TIMES DAILY
Status: DISCONTINUED | OUTPATIENT
Start: 2023-06-29 | End: 2023-07-01 | Stop reason: HOSPADM

## 2023-06-29 RX ORDER — ACETAMINOPHEN 650 MG/1
650 SUPPOSITORY RECTAL EVERY 6 HOURS PRN
Status: DISCONTINUED | OUTPATIENT
Start: 2023-06-29 | End: 2023-06-30

## 2023-06-29 RX ORDER — ONDANSETRON 2 MG/ML
4 INJECTION INTRAMUSCULAR; INTRAVENOUS EVERY 6 HOURS PRN
Status: DISCONTINUED | OUTPATIENT
Start: 2023-06-29 | End: 2023-07-01 | Stop reason: HOSPADM

## 2023-06-29 RX ORDER — ISOSORBIDE DINITRATE 10 MG/1
20 TABLET ORAL 3 TIMES DAILY
Status: DISCONTINUED | OUTPATIENT
Start: 2023-06-29 | End: 2023-07-01 | Stop reason: HOSPADM

## 2023-06-29 RX ORDER — FUROSEMIDE 10 MG/ML
80 INJECTION INTRAMUSCULAR; INTRAVENOUS ONCE
Status: COMPLETED | OUTPATIENT
Start: 2023-06-29 | End: 2023-06-29

## 2023-06-29 RX ORDER — FERROUS SULFATE 325(65) MG
325 TABLET ORAL EVERY OTHER DAY
Status: DISCONTINUED | OUTPATIENT
Start: 2023-06-30 | End: 2023-07-01 | Stop reason: HOSPADM

## 2023-06-29 RX ORDER — PANTOPRAZOLE SODIUM 40 MG/1
40 TABLET, DELAYED RELEASE ORAL
Status: DISCONTINUED | OUTPATIENT
Start: 2023-06-30 | End: 2023-07-01 | Stop reason: HOSPADM

## 2023-06-29 RX ORDER — ACETAMINOPHEN 325 MG/1
650 TABLET ORAL EVERY 6 HOURS PRN
Status: DISCONTINUED | OUTPATIENT
Start: 2023-06-29 | End: 2023-06-30

## 2023-06-29 RX ORDER — SODIUM CHLORIDE 9 MG/ML
INJECTION, SOLUTION INTRAVENOUS PRN
Status: DISCONTINUED | OUTPATIENT
Start: 2023-06-29 | End: 2023-07-01 | Stop reason: HOSPADM

## 2023-06-29 RX ORDER — LANOLIN ALCOHOL/MO/W.PET/CERES
6 CREAM (GRAM) TOPICAL NIGHTLY PRN
Status: DISCONTINUED | OUTPATIENT
Start: 2023-06-29 | End: 2023-07-01 | Stop reason: HOSPADM

## 2023-06-29 RX ORDER — ONDANSETRON 4 MG/1
4 TABLET, ORALLY DISINTEGRATING ORAL EVERY 8 HOURS PRN
Status: DISCONTINUED | OUTPATIENT
Start: 2023-06-29 | End: 2023-07-01 | Stop reason: HOSPADM

## 2023-06-29 RX ORDER — LEVOTHYROXINE SODIUM 0.05 MG/1
50 TABLET ORAL
Status: DISCONTINUED | OUTPATIENT
Start: 2023-06-30 | End: 2023-07-01 | Stop reason: HOSPADM

## 2023-06-29 RX ORDER — VITAMIN B COMPLEX
1000 TABLET ORAL DAILY
Status: DISCONTINUED | OUTPATIENT
Start: 2023-06-30 | End: 2023-07-01 | Stop reason: HOSPADM

## 2023-06-29 RX ORDER — LIDOCAINE 4 G/G
1 PATCH TOPICAL DAILY
Status: DISCONTINUED | OUTPATIENT
Start: 2023-06-30 | End: 2023-07-01 | Stop reason: HOSPADM

## 2023-06-29 RX ORDER — BENZONATATE 100 MG/1
100 CAPSULE ORAL 3 TIMES DAILY PRN
Status: DISCONTINUED | OUTPATIENT
Start: 2023-06-29 | End: 2023-07-01 | Stop reason: HOSPADM

## 2023-06-29 RX ORDER — OXYCODONE HYDROCHLORIDE 5 MG/1
5 TABLET ORAL EVERY 4 HOURS PRN
Status: DISCONTINUED | OUTPATIENT
Start: 2023-06-29 | End: 2023-06-30

## 2023-06-29 RX ORDER — ONDANSETRON 2 MG/ML
4 INJECTION INTRAMUSCULAR; INTRAVENOUS ONCE
Status: COMPLETED | OUTPATIENT
Start: 2023-06-29 | End: 2023-06-29

## 2023-06-29 RX ORDER — SODIUM CHLORIDE 0.9 % (FLUSH) 0.9 %
5-40 SYRINGE (ML) INJECTION EVERY 12 HOURS SCHEDULED
Status: DISCONTINUED | OUTPATIENT
Start: 2023-06-29 | End: 2023-07-01 | Stop reason: HOSPADM

## 2023-06-29 RX ORDER — ACETAMINOPHEN 325 MG/1
650 TABLET ORAL EVERY 6 HOURS
Status: DISCONTINUED | OUTPATIENT
Start: 2023-06-29 | End: 2023-06-30 | Stop reason: ALTCHOICE

## 2023-06-29 RX ADMIN — METOPROLOL TARTRATE 12.5 MG: 25 TABLET, FILM COATED ORAL at 21:44

## 2023-06-29 RX ADMIN — MORPHINE SULFATE 4 MG: 4 INJECTION INTRAVENOUS at 20:31

## 2023-06-29 RX ADMIN — ONDANSETRON 4 MG: 2 INJECTION INTRAMUSCULAR; INTRAVENOUS at 15:24

## 2023-06-29 RX ADMIN — APIXABAN 5 MG: 5 TABLET, FILM COATED ORAL at 21:44

## 2023-06-29 RX ADMIN — SODIUM CHLORIDE, PRESERVATIVE FREE 10 ML: 5 INJECTION INTRAVENOUS at 21:48

## 2023-06-29 RX ADMIN — MORPHINE SULFATE 4 MG: 4 INJECTION INTRAVENOUS at 15:25

## 2023-06-29 RX ADMIN — ISOSORBIDE DINITRATE 20 MG: 10 TABLET ORAL at 21:44

## 2023-06-29 RX ADMIN — GABAPENTIN 300 MG: 300 CAPSULE ORAL at 21:44

## 2023-06-29 RX ADMIN — ATORVASTATIN CALCIUM 40 MG: 40 TABLET, FILM COATED ORAL at 21:44

## 2023-06-29 RX ADMIN — ALBUTEROL SULFATE 2.5 MG: 2.5 SOLUTION RESPIRATORY (INHALATION) at 21:08

## 2023-06-29 RX ADMIN — Medication 6 MG: at 21:45

## 2023-06-29 RX ADMIN — ACETAMINOPHEN 650 MG: 325 TABLET ORAL at 21:44

## 2023-06-29 RX ADMIN — FUROSEMIDE 80 MG: 10 INJECTION, SOLUTION INTRAMUSCULAR; INTRAVENOUS at 16:38

## 2023-06-29 ASSESSMENT — ENCOUNTER SYMPTOMS
EYES NEGATIVE: 1
ABDOMINAL PAIN: 0
VOMITING: 0
SHORTNESS OF BREATH: 0
COUGH: 0
BACK PAIN: 0
NAUSEA: 0

## 2023-06-29 ASSESSMENT — PAIN DESCRIPTION - DESCRIPTORS: DESCRIPTORS: SQUEEZING

## 2023-06-29 ASSESSMENT — PAIN DESCRIPTION - LOCATION
LOCATION: CHEST
LOCATION: CHEST;SHOULDER

## 2023-06-29 ASSESSMENT — PAIN SCALES - GENERAL
PAINLEVEL_OUTOF10: 9
PAINLEVEL_OUTOF10: 8
PAINLEVEL_OUTOF10: 4

## 2023-06-29 ASSESSMENT — PAIN - FUNCTIONAL ASSESSMENT: PAIN_FUNCTIONAL_ASSESSMENT: 0-10

## 2023-06-29 ASSESSMENT — PAIN DESCRIPTION - ORIENTATION: ORIENTATION: LEFT

## 2023-06-30 PROBLEM — R07.9 CHEST PAIN: Status: ACTIVE | Noted: 2023-06-30

## 2023-06-30 PROBLEM — R77.8 TROPONIN LEVEL ELEVATED: Status: ACTIVE | Noted: 2023-06-30
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
## 2023-06-30 PROBLEM — R79.89 TROPONIN LEVEL ELEVATED: Status: ACTIVE | Noted: 2023-06-30

## 2023-06-30 PROBLEM — I48.19 PERSISTENT ATRIAL FIBRILLATION (HCC): Status: ACTIVE | Noted: 2023-06-30

## 2023-06-30 LAB
ANION GAP SERPL CALCULATED.3IONS-SCNC: 6 MMOL/L (ref 3–16)
BASOPHILS # BLD: 0.1 K/UL (ref 0–0.2)
BASOPHILS NFR BLD: 1 %
BUN SERPL-MCNC: 35 MG/DL (ref 7–20)
CALCIUM SERPL-MCNC: 9 MG/DL (ref 8.3–10.6)
CHLORIDE SERPL-SCNC: 100 MMOL/L (ref 99–110)
CO2 SERPL-SCNC: 36 MMOL/L (ref 21–32)
CREAT SERPL-MCNC: 1.8 MG/DL (ref 0.8–1.3)
DEPRECATED RDW RBC AUTO: 17.3 % (ref 12.4–15.4)
EOSINOPHIL # BLD: 0.3 K/UL (ref 0–0.6)
EOSINOPHIL NFR BLD: 4.8 %
GFR SERPLBLD CREATININE-BSD FMLA CKD-EPI: 41 ML/MIN/{1.73_M2}
GLUCOSE SERPL-MCNC: 124 MG/DL (ref 70–99)
HCT VFR BLD AUTO: 32.7 % (ref 40.5–52.5)
HGB BLD-MCNC: 10.2 G/DL (ref 13.5–17.5)
LYMPHOCYTES # BLD: 1 K/UL (ref 1–5.1)
LYMPHOCYTES NFR BLD: 15.4 %
MCH RBC QN AUTO: 26.9 PG (ref 26–34)
MCHC RBC AUTO-ENTMCNC: 31.2 G/DL (ref 31–36)
MCV RBC AUTO: 86.3 FL (ref 80–100)
MONOCYTES # BLD: 0.6 K/UL (ref 0–1.3)
MONOCYTES NFR BLD: 10.3 %
NEUTROPHILS # BLD: 4.2 K/UL (ref 1.7–7.7)
NEUTROPHILS NFR BLD: 68.5 %
PLATELET # BLD AUTO: 235 K/UL (ref 135–450)
PMV BLD AUTO: 9.2 FL (ref 5–10.5)
POTASSIUM SERPL-SCNC: 5 MMOL/L (ref 3.5–5.1)
RBC # BLD AUTO: 3.79 M/UL (ref 4.2–5.9)
SODIUM SERPL-SCNC: 142 MMOL/L (ref 136–145)
WBC # BLD AUTO: 6.2 K/UL (ref 4–11)

## 2023-06-30 PROCEDURE — 80048 BASIC METABOLIC PNL TOTAL CA: CPT

## 2023-06-30 PROCEDURE — 94150 VITAL CAPACITY TEST: CPT

## 2023-06-30 PROCEDURE — 6360000002 HC RX W HCPCS: Performed by: STUDENT IN AN ORGANIZED HEALTH CARE EDUCATION/TRAINING PROGRAM

## 2023-06-30 PROCEDURE — 2700000000 HC OXYGEN THERAPY PER DAY

## 2023-06-30 PROCEDURE — 6360000002 HC RX W HCPCS: Performed by: INTERNAL MEDICINE

## 2023-06-30 PROCEDURE — 85025 COMPLETE CBC W/AUTO DIFF WBC: CPT

## 2023-06-30 PROCEDURE — 6370000000 HC RX 637 (ALT 250 FOR IP): Performed by: INTERNAL MEDICINE

## 2023-06-30 PROCEDURE — 99221 1ST HOSP IP/OBS SF/LOW 40: CPT | Performed by: INTERNAL MEDICINE

## 2023-06-30 PROCEDURE — 2580000003 HC RX 258: Performed by: INTERNAL MEDICINE

## 2023-06-30 PROCEDURE — 99221 1ST HOSP IP/OBS SF/LOW 40: CPT | Performed by: NURSE PRACTITIONER

## 2023-06-30 PROCEDURE — 94640 AIRWAY INHALATION TREATMENT: CPT

## 2023-06-30 PROCEDURE — 36415 COLL VENOUS BLD VENIPUNCTURE: CPT

## 2023-06-30 PROCEDURE — 94761 N-INVAS EAR/PLS OXIMETRY MLT: CPT

## 2023-06-30 PROCEDURE — 6370000000 HC RX 637 (ALT 250 FOR IP): Performed by: STUDENT IN AN ORGANIZED HEALTH CARE EDUCATION/TRAINING PROGRAM

## 2023-06-30 PROCEDURE — 1200000000 HC SEMI PRIVATE

## 2023-06-30 RX ORDER — ACETAMINOPHEN 500 MG
1000 TABLET ORAL EVERY 6 HOURS SCHEDULED
Status: DISCONTINUED | OUTPATIENT
Start: 2023-06-30 | End: 2023-07-01 | Stop reason: HOSPADM

## 2023-06-30 RX ORDER — BUPRENORPHINE AND NALOXONE 4; 1 MG/1; MG/1
1 FILM, SOLUBLE BUCCAL; SUBLINGUAL DAILY
COMMUNITY

## 2023-06-30 RX ORDER — BUPRENORPHINE HYDROCHLORIDE AND NALOXONE HYDROCHLORIDE DIHYDRATE 2; .5 MG/1; MG/1
2 TABLET SUBLINGUAL DAILY
Status: DISCONTINUED | OUTPATIENT
Start: 2023-06-30 | End: 2023-07-01 | Stop reason: HOSPADM

## 2023-06-30 RX ADMIN — GABAPENTIN 300 MG: 300 CAPSULE ORAL at 20:57

## 2023-06-30 RX ADMIN — ASPIRIN 81 MG: 81 TABLET, CHEWABLE ORAL at 09:32

## 2023-06-30 RX ADMIN — ISOSORBIDE DINITRATE 20 MG: 10 TABLET ORAL at 14:09

## 2023-06-30 RX ADMIN — ACETAMINOPHEN 1000 MG: 500 TABLET ORAL at 20:57

## 2023-06-30 RX ADMIN — ACETAMINOPHEN 650 MG: 325 TABLET ORAL at 09:34

## 2023-06-30 RX ADMIN — LEVOTHYROXINE SODIUM 50 MCG: 50 TABLET ORAL at 06:12

## 2023-06-30 RX ADMIN — TORSEMIDE 40 MG: 20 TABLET ORAL at 20:57

## 2023-06-30 RX ADMIN — Medication 1000 UNITS: at 09:35

## 2023-06-30 RX ADMIN — ACETAMINOPHEN 1000 MG: 500 TABLET ORAL at 15:31

## 2023-06-30 RX ADMIN — FLUTICASONE PROPIONATE 2 SPRAY: 50 SPRAY, METERED NASAL at 09:41

## 2023-06-30 RX ADMIN — METOPROLOL TARTRATE 12.5 MG: 25 TABLET, FILM COATED ORAL at 09:33

## 2023-06-30 RX ADMIN — Medication 6 MG: at 23:32

## 2023-06-30 RX ADMIN — DULOXETINE HYDROCHLORIDE 40 MG: 20 CAPSULE, DELAYED RELEASE ORAL at 09:35

## 2023-06-30 RX ADMIN — SODIUM CHLORIDE, PRESERVATIVE FREE 10 ML: 5 INJECTION INTRAVENOUS at 09:40

## 2023-06-30 RX ADMIN — ISOSORBIDE DINITRATE 20 MG: 10 TABLET ORAL at 09:32

## 2023-06-30 RX ADMIN — TORSEMIDE 40 MG: 20 TABLET ORAL at 09:35

## 2023-06-30 RX ADMIN — PANTOPRAZOLE SODIUM 40 MG: 40 TABLET, DELAYED RELEASE ORAL at 06:12

## 2023-06-30 RX ADMIN — OXYCODONE HYDROCHLORIDE 5 MG: 5 TABLET ORAL at 06:16

## 2023-06-30 RX ADMIN — MOMETASONE FUROATE AND FORMOTEROL FUMARATE DIHYDRATE 2 PUFF: 200; 5 AEROSOL RESPIRATORY (INHALATION) at 09:01

## 2023-06-30 RX ADMIN — APIXABAN 5 MG: 5 TABLET, FILM COATED ORAL at 09:35

## 2023-06-30 RX ADMIN — ALBUTEROL SULFATE 2.5 MG: 2.5 SOLUTION RESPIRATORY (INHALATION) at 09:14

## 2023-06-30 RX ADMIN — OXYCODONE HYDROCHLORIDE 5 MG: 5 TABLET ORAL at 09:35

## 2023-06-30 RX ADMIN — APIXABAN 5 MG: 5 TABLET, FILM COATED ORAL at 20:57

## 2023-06-30 RX ADMIN — SODIUM CHLORIDE, PRESERVATIVE FREE 10 ML: 5 INJECTION INTRAVENOUS at 20:58

## 2023-06-30 RX ADMIN — ATORVASTATIN CALCIUM 40 MG: 40 TABLET, FILM COATED ORAL at 17:43

## 2023-06-30 RX ADMIN — GABAPENTIN 300 MG: 300 CAPSULE ORAL at 09:32

## 2023-06-30 RX ADMIN — FERROUS SULFATE TAB 325 MG (65 MG ELEMENTAL FE) 325 MG: 325 (65 FE) TAB at 09:32

## 2023-06-30 RX ADMIN — ISOSORBIDE DINITRATE 20 MG: 10 TABLET ORAL at 20:56

## 2023-06-30 RX ADMIN — ACETAMINOPHEN 650 MG: 325 TABLET ORAL at 03:20

## 2023-06-30 RX ADMIN — MOMETASONE FUROATE AND FORMOTEROL FUMARATE DIHYDRATE 2 PUFF: 200; 5 AEROSOL RESPIRATORY (INHALATION) at 21:16

## 2023-06-30 RX ADMIN — TIOTROPIUM BROMIDE INHALATION SPRAY 2 PUFF: 3.12 SPRAY, METERED RESPIRATORY (INHALATION) at 09:03

## 2023-06-30 RX ADMIN — BUPRENORPHINE HYDROCHLORIDE AND NALOXONE HYDROCHLORIDE DIHYDRATE 2 TABLET: 2; .5 TABLET SUBLINGUAL at 10:58

## 2023-06-30 ASSESSMENT — PAIN DESCRIPTION - FREQUENCY: FREQUENCY: INTERMITTENT

## 2023-06-30 ASSESSMENT — PAIN SCALES - GENERAL
PAINLEVEL_OUTOF10: 0
PAINLEVEL_OUTOF10: 5
PAINLEVEL_OUTOF10: 8
PAINLEVEL_OUTOF10: 0
PAINLEVEL_OUTOF10: 8
PAINLEVEL_OUTOF10: 8
PAINLEVEL_OUTOF10: 7
PAINLEVEL_OUTOF10: 0

## 2023-06-30 ASSESSMENT — PAIN DESCRIPTION - DESCRIPTORS
DESCRIPTORS: ACHING
DESCRIPTORS: SHARP
DESCRIPTORS: ACHING
DESCRIPTORS: SHARP;ACHING

## 2023-06-30 ASSESSMENT — PAIN - FUNCTIONAL ASSESSMENT
PAIN_FUNCTIONAL_ASSESSMENT: ACTIVITIES ARE NOT PREVENTED
PAIN_FUNCTIONAL_ASSESSMENT: PREVENTS OR INTERFERES SOME ACTIVE ACTIVITIES AND ADLS

## 2023-06-30 ASSESSMENT — ENCOUNTER SYMPTOMS
BACK PAIN: 0
SHORTNESS OF BREATH: 0
CHEST TIGHTNESS: 0
TROUBLE SWALLOWING: 0
NAUSEA: 0
SINUS PRESSURE: 0
SORE THROAT: 0
WHEEZING: 0
CONSTIPATION: 0
VOICE CHANGE: 0
VOMITING: 0
SINUS PAIN: 0
COUGH: 0
COLOR CHANGE: 0
ABDOMINAL PAIN: 0
DIARRHEA: 0

## 2023-06-30 ASSESSMENT — PAIN DESCRIPTION - LOCATION
LOCATION: CHEST
LOCATION: SHOULDER
LOCATION: ARM;SHOULDER

## 2023-06-30 ASSESSMENT — PAIN DESCRIPTION - ORIENTATION
ORIENTATION: LEFT
ORIENTATION: LEFT
ORIENTATION: MID

## 2023-06-30 ASSESSMENT — PAIN DESCRIPTION - ONSET: ONSET: GRADUAL

## 2023-06-30 ASSESSMENT — PAIN DESCRIPTION - PAIN TYPE: TYPE: ACUTE PAIN

## 2023-07-01 VITALS
HEART RATE: 61 BPM | RESPIRATION RATE: 17 BRPM | SYSTOLIC BLOOD PRESSURE: 156 MMHG | DIASTOLIC BLOOD PRESSURE: 72 MMHG | WEIGHT: 208.11 LBS | TEMPERATURE: 97.6 F | OXYGEN SATURATION: 99 % | HEIGHT: 68 IN | BODY MASS INDEX: 31.54 KG/M2

## 2023-07-01 PROCEDURE — 6360000002 HC RX W HCPCS: Performed by: STUDENT IN AN ORGANIZED HEALTH CARE EDUCATION/TRAINING PROGRAM

## 2023-07-01 PROCEDURE — 2700000000 HC OXYGEN THERAPY PER DAY

## 2023-07-01 PROCEDURE — 6370000000 HC RX 637 (ALT 250 FOR IP): Performed by: INTERNAL MEDICINE

## 2023-07-01 PROCEDURE — 94150 VITAL CAPACITY TEST: CPT

## 2023-07-01 PROCEDURE — 6370000000 HC RX 637 (ALT 250 FOR IP): Performed by: STUDENT IN AN ORGANIZED HEALTH CARE EDUCATION/TRAINING PROGRAM

## 2023-07-01 PROCEDURE — 94640 AIRWAY INHALATION TREATMENT: CPT

## 2023-07-01 PROCEDURE — 2580000003 HC RX 258: Performed by: INTERNAL MEDICINE

## 2023-07-01 PROCEDURE — 94761 N-INVAS EAR/PLS OXIMETRY MLT: CPT

## 2023-07-01 RX ORDER — LIDOCAINE 4 G/G
1 PATCH TOPICAL DAILY
Qty: 7 EACH | Refills: 0 | Status: SHIPPED | OUTPATIENT
Start: 2023-07-02 | End: 2023-07-09

## 2023-07-01 RX ADMIN — ASPIRIN 81 MG: 81 TABLET, CHEWABLE ORAL at 09:06

## 2023-07-01 RX ADMIN — ACETAMINOPHEN 1000 MG: 500 TABLET ORAL at 02:37

## 2023-07-01 RX ADMIN — ACETAMINOPHEN 1000 MG: 500 TABLET ORAL at 15:42

## 2023-07-01 RX ADMIN — ISOSORBIDE DINITRATE 20 MG: 10 TABLET ORAL at 13:32

## 2023-07-01 RX ADMIN — GABAPENTIN 300 MG: 300 CAPSULE ORAL at 09:05

## 2023-07-01 RX ADMIN — BUPRENORPHINE HYDROCHLORIDE AND NALOXONE HYDROCHLORIDE DIHYDRATE 2 TABLET: 2; .5 TABLET SUBLINGUAL at 09:03

## 2023-07-01 RX ADMIN — MOMETASONE FUROATE AND FORMOTEROL FUMARATE DIHYDRATE 2 PUFF: 200; 5 AEROSOL RESPIRATORY (INHALATION) at 10:27

## 2023-07-01 RX ADMIN — METOPROLOL TARTRATE 12.5 MG: 25 TABLET, FILM COATED ORAL at 09:06

## 2023-07-01 RX ADMIN — APIXABAN 5 MG: 5 TABLET, FILM COATED ORAL at 09:02

## 2023-07-01 RX ADMIN — PANTOPRAZOLE SODIUM 40 MG: 40 TABLET, DELAYED RELEASE ORAL at 06:01

## 2023-07-01 RX ADMIN — ACETAMINOPHEN 1000 MG: 500 TABLET ORAL at 09:10

## 2023-07-01 RX ADMIN — DULOXETINE HYDROCHLORIDE 40 MG: 20 CAPSULE, DELAYED RELEASE ORAL at 09:03

## 2023-07-01 RX ADMIN — TORSEMIDE 40 MG: 20 TABLET ORAL at 09:03

## 2023-07-01 RX ADMIN — Medication 1000 UNITS: at 09:04

## 2023-07-01 RX ADMIN — SODIUM CHLORIDE, PRESERVATIVE FREE 10 ML: 5 INJECTION INTRAVENOUS at 10:00

## 2023-07-01 RX ADMIN — TIOTROPIUM BROMIDE INHALATION SPRAY 2 PUFF: 3.12 SPRAY, METERED RESPIRATORY (INHALATION) at 10:27

## 2023-07-01 RX ADMIN — FLUTICASONE PROPIONATE 2 SPRAY: 50 SPRAY, METERED NASAL at 13:32

## 2023-07-01 RX ADMIN — ISOSORBIDE DINITRATE 20 MG: 10 TABLET ORAL at 09:05

## 2023-07-01 RX ADMIN — LEVOTHYROXINE SODIUM 50 MCG: 50 TABLET ORAL at 06:01

## 2023-07-01 ASSESSMENT — PAIN DESCRIPTION - ORIENTATION
ORIENTATION: LEFT
ORIENTATION: LEFT

## 2023-07-01 ASSESSMENT — PAIN SCALES - GENERAL
PAINLEVEL_OUTOF10: 3
PAINLEVEL_OUTOF10: 3

## 2023-07-01 ASSESSMENT — PAIN DESCRIPTION - DESCRIPTORS
DESCRIPTORS: SORE
DESCRIPTORS: ACHING

## 2023-07-01 ASSESSMENT — PAIN DESCRIPTION - LOCATION
LOCATION: CHEST
LOCATION: CHEST

## 2023-07-25 NOTE — TELEPHONE ENCOUNTER
Called and spoke with patient mother which informs me patient has had toes removed on left foot, brain damage, CVA but confused at times. In the last 3 months has fallen 4 times and was at [facility]. Son Trudi Rubio supposed per sister the [place]. I called and left message with Ted Garcia to call me regarding the appointment to confirm the appointment. I spoke with son Prentice Merlin and he would like a call during the appointment to be in on the conversation.

## 2023-07-26 ENCOUNTER — OFFICE VISIT (OUTPATIENT)
Dept: ORTHOPEDIC SURGERY | Age: 68
End: 2023-07-26

## 2023-07-26 VITALS — BODY MASS INDEX: 31.52 KG/M2 | HEIGHT: 68 IN | WEIGHT: 208 LBS

## 2023-07-26 DIAGNOSIS — F17.200 CURRENT SMOKER: ICD-10-CM

## 2023-07-26 DIAGNOSIS — S52.121A CLOSED TRAUMATIC MINIMALLY DISPLACED FRACTURE OF HEAD OF RIGHT RADIUS, INITIAL ENCOUNTER: Primary | ICD-10-CM

## 2023-07-26 DIAGNOSIS — J44.9 COPD MIXED TYPE (HCC): ICD-10-CM

## 2023-07-28 ENCOUNTER — OFFICE VISIT (OUTPATIENT)
Dept: CARDIOLOGY CLINIC | Age: 68
End: 2023-07-28

## 2023-07-28 VITALS
SYSTOLIC BLOOD PRESSURE: 136 MMHG | BODY MASS INDEX: 31.52 KG/M2 | WEIGHT: 208 LBS | OXYGEN SATURATION: 98 % | HEIGHT: 68 IN | HEART RATE: 87 BPM | DIASTOLIC BLOOD PRESSURE: 80 MMHG

## 2023-07-28 DIAGNOSIS — I10 ESSENTIAL HYPERTENSION: ICD-10-CM

## 2023-07-28 DIAGNOSIS — I25.10 CORONARY ARTERY DISEASE INVOLVING NATIVE CORONARY ARTERY OF NATIVE HEART WITHOUT ANGINA PECTORIS: ICD-10-CM

## 2023-07-28 DIAGNOSIS — I48.19 PERSISTENT ATRIAL FIBRILLATION (HCC): Primary | ICD-10-CM

## 2023-07-28 DIAGNOSIS — E78.2 MIXED HYPERLIPIDEMIA: ICD-10-CM

## 2023-07-28 PROCEDURE — 3079F DIAST BP 80-89 MM HG: CPT | Performed by: INTERNAL MEDICINE

## 2023-07-28 PROCEDURE — 1123F ACP DISCUSS/DSCN MKR DOCD: CPT | Performed by: INTERNAL MEDICINE

## 2023-07-28 PROCEDURE — 93000 ELECTROCARDIOGRAM COMPLETE: CPT | Performed by: INTERNAL MEDICINE

## 2023-07-28 PROCEDURE — 99215 OFFICE O/P EST HI 40 MIN: CPT | Performed by: INTERNAL MEDICINE

## 2023-07-28 PROCEDURE — 3075F SYST BP GE 130 - 139MM HG: CPT | Performed by: INTERNAL MEDICINE

## 2023-07-28 NOTE — PROGRESS NOTES
Inhale 2 puffs into the lungs every 6 hours as needed for Wheezing    Historical Provider, MD   aspirin 81 MG chewable tablet Take 1 tablet by mouth daily    Historical Provider, MD   atorvastatin (LIPITOR) 40 MG tablet Take 1 tablet by mouth every evening    Historical Provider, MD   DULoxetine HCl 40 MG CPEP Take 1 capsule by mouth daily    Historical Provider, MD   ferrous sulfate (IRON 325) 325 (65 Fe) MG tablet Take 1 tablet by mouth every other day    Historical Provider, MD   gabapentin (NEURONTIN) 100 MG capsule Take 3 capsules by mouth 2 times daily. Historical Provider, MD   ipratropium-albuterol (DUONEB) 0.5-2.5 (3) MG/3ML SOLN nebulizer solution Inhale 3 mLs into the lungs every 2 hours as needed for Shortness of Breath or Wheezing    Historical Provider, MD   levothyroxine (SYNTHROID) 50 MCG tablet Take 1 tablet by mouth every morning (before breakfast)    Historical Provider, MD   melatonin 3 MG TABS tablet Take 2 tablets by mouth nightly as needed (sleep)    Historical Provider, MD   metoprolol tartrate (LOPRESSOR) 25 MG tablet Take 0.5 tablets by mouth 2 times daily    Historical Provider, MD   naloxone 4 MG/0.1ML LIQD nasal spray 1 spray by Nasal route as needed for Opioid Reversal    Historical Provider, MD   omeprazole (PRILOSEC) 40 MG delayed release capsule Take 1 capsule by mouth daily    Historical Provider, MD        CURRENT Medications:  No current facility-administered medications for this visit. Allergies:  Patient has no known allergies. Review of Systems: All reviewed and refer to HPI  Constitutional: no unanticipated weight loss. There's been no change in energy level, sleep pattern, or activity level. No fevers, chills. Eyes: No visual changes or diplopia. No scleral icterus. ENT: No Headaches, hearing loss or vertigo. No mouth sores or sore throat. Cardiovascular: No Chest pain, tightness or discomfort. No Shortness of breath.    No Dyspnea on exertion,
oral-anticoagulation, and the rationale for this referral.  Based on both stroke and bleeding risk, a shared decision has been made to pursue closure of the left atrial appendage as an alternative to oral anticoagulant therapy for stroke prophylaxis and to reduce their long term risk of incidence of bleeding. I had a detail discussion with the patient and family regarding the risk and benefit of the procedures. I explained to them the details of the procedure. I explained to them that the procedure will be performed under general anesthesia using MARY and fluoroscopic guidance. I explained any risk of bleeding, pericardial effusion and tamponade, perforation of the vessel, stroke, myocardial infarction or death. The patient and family understood the risk and benefits and the details of procedure and would like to go ahead with the procedure. The patient gave informed consent. All questions and concerns answered at this time. Coronary artery disease  Continue Asa, B-blocker and statin therapy. Chronic heart failure with  preserved EF  Continue B-blocker and torsemide. Essential hypertension  Controlled. Goal BP <130/80. Continue medical therapy. Hyperlipidemia  Continue high intensity statin. COPD  On home O2. Thank you for allowing us to participate in the care of [address] [place] [address],[address]. Please do not hesitate to contact me if you have any questions. Antionette Monahan MD, MPH    [address] [place] [address] 82, [facility], [address] [place] [address]  Ph: [phone]  Fax: [phone]      Scribe's attestation: This note was scribed in the presence of Antionette Monahan MD by Resa Peabody RN  Physician Attestation:  The scribes documentation has been prepared under my direction and personally reviewed by me in its entirety. I confirm that the note above accurately reflects all work, treatment, procedures, and medical decision making performed by me.

## 2023-07-28 NOTE — TELEPHONE ENCOUNTER
Spoke with patient today regarding Watchman procedure. Patient was shown video on Watchman and given educational material.  Patient states interest but would like to proceed. Copy of the Cardio smart tool was given for shared decision. Patient will need a shared decision office visit with Dr. Adelia Peterson before proceeding with Watchman. Mariella Cameron son was part of the conversation via telephone. Patient will give his son the pamphlet of information and will discuss and then schedule a shared decision.
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
## 2023-08-02 PROBLEM — S52.121A: Status: ACTIVE | Noted: 2023-06-01

## 2023-08-02 PROBLEM — F17.200 CURRENT SMOKER: Status: ACTIVE | Noted: 2023-08-02

## 2023-08-02 PROBLEM — J44.9 COPD MIXED TYPE (HCC): Status: ACTIVE | Noted: 2023-08-02

## 2023-08-02 NOTE — PROGRESS NOTES
bilaterally. IMAGING:  Xrays taken today in the office, 3 views of right elbow were reviewed, and showed minimally displaced radial head fracture. IMPRESSION:  Right minimally displaced radial head fracture. 1- Right elbow pain/ minimally displaced radial head fracture. 2- COPD on O2, 24/7    PLAN:  I discussed that the overall alignment of this fracture is good and that we can try to treat this non-operatively in a sling and gentle ROM right elbow, with no heavy impact activities. We discussed the risk of nonunion and or malunion. We will see him  back in 2 months weeks at which time we will get a new xray of the right elbow. As this patient has demonstrated risk factors for osteoporosis, such as age greater than fifty years and evidence of a fracture, I have referred the patient back to the primary care physician for evaluation for osteoporosis, including consideration for DEXA scanning, if this is felt to be clinically indicated. The patient is advised to contact the primary care physician to follow-up for further evaluation.        Namrata Solorio MD

## 2023-08-08 NOTE — TELEPHONE ENCOUNTER
Spoke with patients son Farrah Sarah regarding moving forward or not. Patient is interested in the [facility] but needs a shared decision. I talked to [facility] at [facility] and scheduled an appointment to see Dr. Marquita Ly on 8/31/2023 at 1:30 for a second opinion.

## 2023-08-09 NOTE — PROGRESS NOTES
Cardiovascular:  Negative for chest pain, palpitations and leg swelling. Gastrointestinal:  Negative for abdominal distention, abdominal pain, blood in stool and vomiting. Endocrine: Negative for cold intolerance, heat intolerance and polyuria. Genitourinary:  Negative for difficulty urinating, dysuria, frequency and hematuria. Musculoskeletal:  Negative for back pain, joint swelling, myalgias and neck pain. Skin:  Negative for color change, pallor and rash. Allergic/Immunologic: Negative for immunocompromised state. Neurological:  Negative for dizziness, syncope, weakness, light-headedness, numbness and headaches. Hematological:  Negative for adenopathy. Does not bruise/bleed easily. Psychiatric/Behavioral:  Negative for behavioral problems, confusion, decreased concentration and suicidal ideas. The patient is not nervous/anxious. Vitals:    08/31/23 1334   BP: 134/72   Pulse: 52    Weight - Scale: 210 lb (95.3 kg)       Vitals:    08/31/23 1334   BP: 134/72   Site: Left Upper Arm   Position: Sitting   Cuff Size: Medium Adult   Pulse: 52   Weight: 210 lb (95.3 kg)       BP Readings from Last 3 Encounters:   08/31/23 134/72   07/28/23 136/80   07/01/23 (!) 156/72       Wt Readings from Last 3 Encounters:   08/31/23 210 lb (95.3 kg)   07/28/23 208 lb (94.3 kg)   07/26/23 208 lb (94.3 kg)       Physical Exam  Constitutional:       General: He is not in acute distress. Appearance: He is well-developed. He is not diaphoretic. HENT:      Head: Normocephalic and atraumatic. Eyes:      Pupils: Pupils are equal, round, and reactive to light. Neck:      Thyroid: No thyromegaly. Vascular: No JVD. Cardiovascular:      Rate and Rhythm: Normal rate and regular rhythm. Chest Wall: PMI is not displaced. Heart sounds: Normal heart sounds, S1 normal and S2 normal. No murmur heard. No friction rub. No gallop.    Pulmonary:      Effort: Pulmonary effort is normal. No respiratory

## 2023-08-30 ENCOUNTER — TELEPHONE (OUTPATIENT)
Dept: CARDIOLOGY CLINIC | Age: 68
End: 2023-08-30

## 2023-08-30 DIAGNOSIS — I48.0 PAROXYSMAL A-FIB (HCC): Primary | ICD-10-CM

## 2023-08-30 DIAGNOSIS — Z01.818 PRE-OP TESTING: ICD-10-CM

## 2023-08-31 ENCOUNTER — OFFICE VISIT (OUTPATIENT)
Dept: CARDIOLOGY CLINIC | Age: 68
End: 2023-08-31
Payer: MEDICARE

## 2023-08-31 VITALS
SYSTOLIC BLOOD PRESSURE: 134 MMHG | WEIGHT: 210 LBS | DIASTOLIC BLOOD PRESSURE: 72 MMHG | BODY MASS INDEX: 31.93 KG/M2 | HEART RATE: 52 BPM

## 2023-08-31 DIAGNOSIS — I25.10 CORONARY ARTERY DISEASE INVOLVING NATIVE CORONARY ARTERY OF NATIVE HEART WITHOUT ANGINA PECTORIS: ICD-10-CM

## 2023-08-31 DIAGNOSIS — E78.2 MIXED HYPERLIPIDEMIA: ICD-10-CM

## 2023-08-31 DIAGNOSIS — I48.19 PERSISTENT ATRIAL FIBRILLATION (HCC): ICD-10-CM

## 2023-08-31 PROCEDURE — G8417 CALC BMI ABV UP PARAM F/U: HCPCS | Performed by: INTERNAL MEDICINE

## 2023-08-31 PROCEDURE — G8427 DOCREV CUR MEDS BY ELIG CLIN: HCPCS | Performed by: INTERNAL MEDICINE

## 2023-08-31 PROCEDURE — 1036F TOBACCO NON-USER: CPT | Performed by: INTERNAL MEDICINE

## 2023-08-31 PROCEDURE — 1123F ACP DISCUSS/DSCN MKR DOCD: CPT | Performed by: INTERNAL MEDICINE

## 2023-08-31 PROCEDURE — 99214 OFFICE O/P EST MOD 30 MIN: CPT | Performed by: INTERNAL MEDICINE

## 2023-08-31 PROCEDURE — 3017F COLORECTAL CA SCREEN DOC REV: CPT | Performed by: INTERNAL MEDICINE

## 2023-08-31 ASSESSMENT — ENCOUNTER SYMPTOMS
BACK PAIN: 0
FACIAL SWELLING: 0
WHEEZING: 0
CHEST TIGHTNESS: 0
VOMITING: 0
BLOOD IN STOOL: 0
EYE DISCHARGE: 0
ABDOMINAL DISTENTION: 0
COLOR CHANGE: 0
ABDOMINAL PAIN: 0
SHORTNESS OF BREATH: 0
COUGH: 0

## 2023-09-15 NOTE — TELEPHONE ENCOUNTER
Spoke with patient's son and informed him of the procedure that is being scheduled on 10/16/2023 at 6:45 AM arrival.  Lab work has been ordered and instructed to have done at [facility] 10/9/2023. Also informed to get Hibiclens bath at their Pharmacy. All procedure instructions were e-mailed to patient. Instructed to contact me with any questions prior to procedure.

## 2023-09-15 NOTE — TELEPHONE ENCOUNTER
Josias Whiting 6/5/2023 referred for Watchman procedure.   Dr. Antonia Butterfield consulted on 7/28/2023  Dr. Lino Jacobson shared decision on 8/31/2023  MARY to be done the day of the procedure  Insurance to be approved per [facility]

## 2023-09-15 NOTE — TELEPHONE ENCOUNTER
Spoke with Terra Cantu at [facility] which is Director of patient Coordinator. She ask me to e-mail all orders and the letter of information to [email or web address] as well as son will recv. An email at [email or web address] with all information related to patients upcoming Watchman procedure.

## 2023-10-05 NOTE — TELEPHONE ENCOUNTER
Spoke with Nursing home [facility] reminded them of upcoming Watchman procedure on 10/16/2023 and made sure they had transportation set up. I faxed Watchman letter and lab orders to [phone]. Yolanda Petty from [facility] gave me updated insurance information. [address]  [id number] . Claim dept. [address] [address]. Phone [phone].

## 2023-10-10 NOTE — TELEPHONE ENCOUNTER
Called and left message at nursing home for Arias Steward to call me to reschedule patients Watchman procedure. Patient changed insurance companies and the new insurance has not given a pre auth yet to approve for the Watchman procedure. Patient needs to be rescheduled.

## 2023-10-16 ENCOUNTER — HOSPITAL ENCOUNTER (OUTPATIENT)
Dept: CARDIAC CATH/INVASIVE PROCEDURES | Age: 68
Discharge: NURSING, REHAB OR LONG-TERM CARE | End: 2023-10-16
Attending: INTERNAL MEDICINE | Admitting: INTERNAL MEDICINE
Payer: MEDICARE

## 2023-10-16 VITALS
RESPIRATION RATE: 20 BRPM | DIASTOLIC BLOOD PRESSURE: 67 MMHG | OXYGEN SATURATION: 98 % | HEART RATE: 70 BPM | BODY MASS INDEX: 31.83 KG/M2 | HEIGHT: 68 IN | WEIGHT: 210 LBS | SYSTOLIC BLOOD PRESSURE: 176 MMHG

## 2023-10-16 LAB
EKG ATRIAL RATE: 87 BPM
EKG DIAGNOSIS: NORMAL
EKG Q-T INTERVAL: 386 MS
EKG QRS DURATION: 90 MS
EKG QTC CALCULATION (BAZETT): 407 MS
EKG R AXIS: 68 DEGREES
EKG T AXIS: 80 DEGREES
EKG VENTRICULAR RATE: 67 BPM

## 2023-10-16 PROCEDURE — 93005 ELECTROCARDIOGRAM TRACING: CPT | Performed by: INTERNAL MEDICINE

## 2023-10-16 PROCEDURE — 93010 ELECTROCARDIOGRAM REPORT: CPT | Performed by: INTERNAL MEDICINE

## 2023-10-16 PROCEDURE — 93926 LOWER EXTREMITY STUDY: CPT

## 2023-10-16 RX ORDER — HYDROMORPHONE HYDROCHLORIDE 2 MG/ML
0.25 INJECTION, SOLUTION INTRAMUSCULAR; INTRAVENOUS; SUBCUTANEOUS EVERY 5 MIN PRN
OUTPATIENT
Start: 2023-10-16

## 2023-10-16 RX ORDER — SODIUM CHLORIDE 9 MG/ML
INJECTION, SOLUTION INTRAVENOUS CONTINUOUS
OUTPATIENT
Start: 2023-10-16

## 2023-10-16 RX ORDER — SODIUM CHLORIDE 0.9 % (FLUSH) 0.9 %
5-40 SYRINGE (ML) INJECTION PRN
OUTPATIENT
Start: 2023-10-16

## 2023-10-16 RX ORDER — SODIUM CHLORIDE 9 MG/ML
INJECTION, SOLUTION INTRAVENOUS PRN
OUTPATIENT
Start: 2023-10-16

## 2023-10-16 RX ORDER — HYDROMORPHONE HYDROCHLORIDE 2 MG/ML
0.5 INJECTION, SOLUTION INTRAMUSCULAR; INTRAVENOUS; SUBCUTANEOUS EVERY 5 MIN PRN
OUTPATIENT
Start: 2023-10-16

## 2023-10-16 RX ORDER — LIDOCAINE HYDROCHLORIDE 10 MG/ML
1 INJECTION, SOLUTION EPIDURAL; INFILTRATION; INTRACAUDAL; PERINEURAL
OUTPATIENT
Start: 2023-10-16 | End: 2023-10-17

## 2023-10-16 RX ORDER — SODIUM CHLORIDE 0.9 % (FLUSH) 0.9 %
5-40 SYRINGE (ML) INJECTION EVERY 12 HOURS SCHEDULED
OUTPATIENT
Start: 2023-10-16

## 2023-10-16 RX ORDER — ONDANSETRON 2 MG/ML
4 INJECTION INTRAMUSCULAR; INTRAVENOUS
OUTPATIENT
Start: 2023-10-16 | End: 2023-10-17

## 2023-10-16 RX ORDER — LABETALOL HYDROCHLORIDE 5 MG/ML
5 INJECTION, SOLUTION INTRAVENOUS
OUTPATIENT
Start: 2023-10-16

## 2023-10-16 RX ORDER — SODIUM CHLORIDE 9 MG/ML
INJECTION, SOLUTION INTRAVENOUS CONTINUOUS
Status: DISCONTINUED | OUTPATIENT
Start: 2023-10-16 | End: 2023-10-17 | Stop reason: HOSPADM

## 2023-10-16 NOTE — PROGRESS NOTES
Pt arrived to lab, pt with wound on left leg. Dr. Dwaine Patricia in to see pt. Procedure cancelled at this time. Pt transport called. Will continue to monitor.

## 2023-10-16 NOTE — TELEPHONE ENCOUNTER
Patient procedure needs rescheduled d/t foot wound that needs to be treated before we can proceed with the Watchman implant date.

## 2023-10-16 NOTE — PROGRESS NOTES
PRE-PROCEDURE    DATE: 10/16/2023 ARRIVAL TO CATH LAB: 7:30 AM    ADMIT SOURCE: Outpatient    ID & ALLERGY BAND: On    CONSENT: Yes    NPO SINCE: Midnight    LABS/PREGNANCY TEST: N/A    PULSES: Right DP {[facility] Cath Lab Pulses Strength:58065}  Right PT {[facility] Cath Lab Pulses Strength:41051}  Left DP {[facility] Cath Lab Pulses Strength:97912}  Left PT {[facility] Cath Lab Pulses Strength:84927}    IV SITE : Started in ***.  with fluids infusing at kvo 7:30 AM     EKG RHYTHM: Atrial Fibrillation

## 2023-10-16 NOTE — PROGRESS NOTES
Patient/family given discharge instructions. Patient/family verbalize understanding of discharge instructions, all questions addressed, copy given to patient/family. No complications noted. Pt transferred to vehicle via wheelchair to be discharged to nursing home via transport services. Report given to [facility].

## 2023-10-20 ENCOUNTER — HOSPITAL ENCOUNTER (OUTPATIENT)
Dept: WOUND CARE | Age: 68
Discharge: HOME | End: 2023-10-20
Payer: MEDICARE

## 2023-10-20 VITALS
HEART RATE: 105 BPM | DIASTOLIC BLOOD PRESSURE: 76 MMHG | SYSTOLIC BLOOD PRESSURE: 197 MMHG | BODY MASS INDEX: 31.93 KG/M2 | TEMPERATURE: 98.1 F | RESPIRATION RATE: 18 BRPM | HEIGHT: 68 IN

## 2023-10-20 DIAGNOSIS — M79.89 LEG SWELLING: ICD-10-CM

## 2023-10-20 DIAGNOSIS — I83.029 VENOUS STASIS ULCER OF LEFT LOWER LEG WITH EDEMA OF LEFT LOWER LEG (HCC): Primary | ICD-10-CM

## 2023-10-20 DIAGNOSIS — L97.929 VENOUS STASIS ULCER OF LEFT LOWER LEG WITH EDEMA OF LEFT LOWER LEG (HCC): Primary | ICD-10-CM

## 2023-10-20 DIAGNOSIS — R60.0 VENOUS STASIS ULCER OF LEFT LOWER LEG WITH EDEMA OF LEFT LOWER LEG (HCC): Primary | ICD-10-CM

## 2023-10-20 DIAGNOSIS — I83.892 VENOUS STASIS ULCER OF LEFT LOWER LEG WITH EDEMA OF LEFT LOWER LEG (HCC): Primary | ICD-10-CM

## 2023-10-20 PROCEDURE — 99203 OFFICE O/P NEW LOW 30 MIN: CPT

## 2023-10-20 RX ORDER — TRIAMCINOLONE ACETONIDE 1 MG/G
OINTMENT TOPICAL ONCE
OUTPATIENT
Start: 2023-10-20 | End: 2023-10-20

## 2023-10-20 RX ORDER — SODIUM CHLOR/HYPOCHLOROUS ACID 0.033 %
SOLUTION, IRRIGATION IRRIGATION ONCE
OUTPATIENT
Start: 2023-10-20 | End: 2023-10-20

## 2023-10-20 RX ORDER — GINSENG 100 MG
CAPSULE ORAL ONCE
OUTPATIENT
Start: 2023-10-20 | End: 2023-10-20

## 2023-10-20 RX ORDER — IBUPROFEN 200 MG
TABLET ORAL ONCE
OUTPATIENT
Start: 2023-10-20 | End: 2023-10-20

## 2023-10-20 RX ORDER — BACITRACIN ZINC AND POLYMYXIN B SULFATE 500; 1000 [USP'U]/G; [USP'U]/G
OINTMENT TOPICAL ONCE
OUTPATIENT
Start: 2023-10-20 | End: 2023-10-20

## 2023-10-20 RX ORDER — GENTAMICIN SULFATE 1 MG/G
OINTMENT TOPICAL ONCE
OUTPATIENT
Start: 2023-10-20 | End: 2023-10-20

## 2023-10-20 RX ORDER — LIDOCAINE HYDROCHLORIDE 20 MG/ML
JELLY TOPICAL ONCE
OUTPATIENT
Start: 2023-10-20 | End: 2023-10-20

## 2023-10-20 RX ORDER — BETAMETHASONE DIPROPIONATE 0.05 %
OINTMENT (GRAM) TOPICAL ONCE
OUTPATIENT
Start: 2023-10-20 | End: 2023-10-20

## 2023-10-20 RX ORDER — LIDOCAINE HYDROCHLORIDE 40 MG/ML
SOLUTION TOPICAL ONCE
Status: COMPLETED | OUTPATIENT
Start: 2023-10-20 | End: 2023-10-20

## 2023-10-20 RX ORDER — LIDOCAINE 50 MG/G
OINTMENT TOPICAL ONCE
OUTPATIENT
Start: 2023-10-20 | End: 2023-10-20

## 2023-10-20 RX ORDER — LIDOCAINE 40 MG/G
CREAM TOPICAL ONCE
OUTPATIENT
Start: 2023-10-20 | End: 2023-10-20

## 2023-10-20 RX ORDER — LIDOCAINE HYDROCHLORIDE 40 MG/ML
SOLUTION TOPICAL ONCE
OUTPATIENT
Start: 2023-10-20 | End: 2023-10-20

## 2023-10-20 RX ORDER — CLOBETASOL PROPIONATE 0.5 MG/G
OINTMENT TOPICAL ONCE
OUTPATIENT
Start: 2023-10-20 | End: 2023-10-20

## 2023-10-20 RX ADMIN — LIDOCAINE HYDROCHLORIDE 5 ML: 40 SOLUTION TOPICAL at 10:20

## 2023-10-20 ASSESSMENT — PAIN DESCRIPTION - ORIENTATION
ORIENTATION: LEFT
ORIENTATION: LEFT

## 2023-10-20 ASSESSMENT — PAIN DESCRIPTION - LOCATION
LOCATION: LEG
LOCATION: LEG

## 2023-10-20 ASSESSMENT — PAIN DESCRIPTION - ONSET
ONSET: ON-GOING
ONSET: ON-GOING

## 2023-10-20 ASSESSMENT — PAIN - FUNCTIONAL ASSESSMENT
PAIN_FUNCTIONAL_ASSESSMENT: ACTIVITIES ARE NOT PREVENTED
PAIN_FUNCTIONAL_ASSESSMENT: ACTIVITIES ARE NOT PREVENTED

## 2023-10-20 ASSESSMENT — PAIN SCALES - GENERAL
PAINLEVEL_OUTOF10: 7
PAINLEVEL_OUTOF10: 4

## 2023-10-20 ASSESSMENT — PAIN DESCRIPTION - PAIN TYPE
TYPE: ACUTE PAIN
TYPE: ACUTE PAIN

## 2023-10-20 ASSESSMENT — PAIN DESCRIPTION - FREQUENCY
FREQUENCY: INTERMITTENT
FREQUENCY: INTERMITTENT

## 2023-10-20 ASSESSMENT — PAIN DESCRIPTION - DESCRIPTORS
DESCRIPTORS: BURNING
DESCRIPTORS: BURNING

## 2023-10-20 NOTE — PATIENT INSTRUCTIONS
[address] Physician Orders and Discharge Instructions  [address], [address] [facility], [address] [place], [place] [address]  Telephone: [phone] [address] 8:30 am - 4:30 pm and Friday 8:30 am - 11:30 am      NAME:  Chery Díaz OF BIRTH:  1955  MEDICAL RECORD NUMBER:  [id number]  DATE:  10/20/2023      Return Appointment:  [x] Return Appointment: With Scott Borges CNP  in  1  [facility])                  [] Nurse Visit for Wound Assessment:  [] ECF or Home Healthcare: Tae Br[facility] is responsible to order your supplies. [] Orders placed during your visit:  Recommend shoe insert left shoe     Wound Location: Left  Lower Leg    WOUND CLEANSING:Normal Saline    PRIMARY DRESSING: PLAIN Collagen, moistened with saline      SECONDARY DRESSING: 4X4 gauze pad and Kerlix    Spandagrip Medium (20-30mm/Hg)    Dressing Frequency: Three times per week  _____________________________________________________________________________________________                       []Activity: Activity as tolerated                    [] Assistive Devices   wheel chair   Use as instructed by the provider               Please consider stop smoking     Dietary: Continue your diet as tolerated. Important dietary reminders:  1. Increase Protein intake (i.e. Lean meats, fish, eggs, legumes, and yogurt)  2. No added salt  3. If diabetic, follow a diabetic diet and check glucose prior to meals or as instructed by your physician. Suggested Dietary Supplements:  León       30ml ProStat  EnsureEnlive   Ensure Max/Premier  ____________________________________________________________________________________________  Pain:   Please Note : some pain, drainage and/or bleeding might be expected after seeing the provider. If you are still having pain after you go home: For wounds on lower legs or arms, elevate the affected limb.   Use over-the-counter

## 2023-10-20 NOTE — PROGRESS NOTES
carotid bruits  Extremities: no cyanosis or clubbing; LLE +1 pitting edema  Musculoskeletal: normal range of motion, no joint swelling, deformity or tenderness  Neurologic: reflexes normal and symmetric, no cranial nerve deficit, gait, coordination and speech normal      Assessment:        Problem List Items Addressed This Visit       Venous stasis ulcer of left lower leg with edema of left lower leg (HCC)    Leg swelling        Procedure Note  Indications:  Based on my examination of this patient's wound(s)/ulcer(s) today, debridement is required to promote healing and evaluate the wound base. Performed by: GIBRAN Bundy CNP    Consent obtained:  Yes    Time out taken:  Yes    Pain Control: Anesthetic  Anesthetic: 4% Lidocaine Liquid Topical       Debridement: Excisional Debridement    Using curette and forceps the wound(s)/ulcer(s) was/were debrided down through and including the removal of epidermis, dermis, and subcutaneous tissue. Devitalized Tissue Debrided:  fibrin, biofilm, and slough    Pre Debridement Measurements:  Are located in the [place]  Documentation Flow Sheet    Diabetic/Pressure/Non Pressure Ulcers only:  Ulcer: Non-Pressure ulcer, fat layer exposed     Wound/Ulcer #: 1    Post Debridement Measurements:  Wound/Ulcer Descriptions are Pre Debridement except measurements:    Wound 06/03/23 Elbow Right; Anterior Tear (Active)   Number of days: 138       Wound 10/20/23 Leg Left; Anterior; Lower #1 Noted 9/29/2023 (Active)   Wound Image   10/20/23 0934   Dressing Status Old drainage noted 10/20/23 0934   Dressing/Treatment Collagen;Moisten with saline;Gauze dressing/dressing sponge;Roll gauze 10/20/23 1015   Wound Length (cm) 1.2 cm 10/20/23 0934   Wound Width (cm) 1.6 cm 10/20/23 0934   Wound Depth (cm) 0.1 cm 10/20/23 0934   Wound Surface Area (cm^2) 1.92 cm^2 10/20/23 0934   Wound Volume (cm^3) 0.192 cm^3 10/20/23 0934   Post-Procedure Length (cm) 1.3 cm 10/20/23 0948

## 2023-10-27 ENCOUNTER — HOSPITAL ENCOUNTER (OUTPATIENT)
Dept: WOUND CARE | Age: 68
Discharge: HOME | End: 2023-10-27
Payer: MEDICARE

## 2023-10-27 VITALS
RESPIRATION RATE: 18 BRPM | DIASTOLIC BLOOD PRESSURE: 87 MMHG | SYSTOLIC BLOOD PRESSURE: 145 MMHG | HEART RATE: 74 BPM | TEMPERATURE: 97.2 F

## 2023-10-27 DIAGNOSIS — R60.0 VENOUS STASIS ULCER OF LEFT LOWER LEG WITH EDEMA OF LEFT LOWER LEG (HCC): Primary | ICD-10-CM

## 2023-10-27 DIAGNOSIS — I83.892 VENOUS STASIS ULCER OF LEFT LOWER LEG WITH EDEMA OF LEFT LOWER LEG (HCC): Primary | ICD-10-CM

## 2023-10-27 DIAGNOSIS — M79.89 LEG SWELLING: ICD-10-CM

## 2023-10-27 DIAGNOSIS — I83.029 VENOUS STASIS ULCER OF LEFT LOWER LEG WITH EDEMA OF LEFT LOWER LEG (HCC): Primary | ICD-10-CM

## 2023-10-27 DIAGNOSIS — L97.929 VENOUS STASIS ULCER OF LEFT LOWER LEG WITH EDEMA OF LEFT LOWER LEG (HCC): Primary | ICD-10-CM

## 2023-10-27 PROCEDURE — 99213 OFFICE O/P EST LOW 20 MIN: CPT

## 2023-10-27 PROCEDURE — 99212 OFFICE O/P EST SF 10 MIN: CPT | Performed by: NURSE PRACTITIONER

## 2023-10-27 RX ORDER — SODIUM CHLOR/HYPOCHLOROUS ACID 0.033 %
SOLUTION, IRRIGATION IRRIGATION ONCE
Status: CANCELLED | OUTPATIENT
Start: 2023-10-27 | End: 2023-10-27

## 2023-10-27 RX ORDER — BACITRACIN ZINC AND POLYMYXIN B SULFATE 500; 1000 [USP'U]/G; [USP'U]/G
OINTMENT TOPICAL ONCE
Status: CANCELLED | OUTPATIENT
Start: 2023-10-27 | End: 2023-10-27

## 2023-10-27 RX ORDER — GINSENG 100 MG
CAPSULE ORAL ONCE
Status: CANCELLED | OUTPATIENT
Start: 2023-10-27 | End: 2023-10-27

## 2023-10-27 RX ORDER — LIDOCAINE HYDROCHLORIDE 20 MG/ML
JELLY TOPICAL ONCE
Status: CANCELLED | OUTPATIENT
Start: 2023-10-27 | End: 2023-10-27

## 2023-10-27 RX ORDER — LIDOCAINE 50 MG/G
OINTMENT TOPICAL ONCE
Status: CANCELLED | OUTPATIENT
Start: 2023-10-27 | End: 2023-10-27

## 2023-10-27 RX ORDER — LIDOCAINE HYDROCHLORIDE 40 MG/ML
SOLUTION TOPICAL ONCE
Status: CANCELLED | OUTPATIENT
Start: 2023-10-27 | End: 2023-10-27

## 2023-10-27 RX ORDER — LIDOCAINE 40 MG/G
CREAM TOPICAL ONCE
Status: CANCELLED | OUTPATIENT
Start: 2023-10-27 | End: 2023-10-27

## 2023-10-27 RX ORDER — IBUPROFEN 200 MG
TABLET ORAL ONCE
Status: CANCELLED | OUTPATIENT
Start: 2023-10-27 | End: 2023-10-27

## 2023-10-27 RX ORDER — GENTAMICIN SULFATE 1 MG/G
OINTMENT TOPICAL ONCE
Status: CANCELLED | OUTPATIENT
Start: 2023-10-27 | End: 2023-10-27

## 2023-10-27 RX ORDER — TRIAMCINOLONE ACETONIDE 1 MG/G
OINTMENT TOPICAL ONCE
Status: CANCELLED | OUTPATIENT
Start: 2023-10-27 | End: 2023-10-27

## 2023-10-27 RX ORDER — CLOBETASOL PROPIONATE 0.5 MG/G
OINTMENT TOPICAL ONCE
Status: CANCELLED | OUTPATIENT
Start: 2023-10-27 | End: 2023-10-27

## 2023-10-27 RX ORDER — BETAMETHASONE DIPROPIONATE 0.05 %
OINTMENT (GRAM) TOPICAL ONCE
Status: CANCELLED | OUTPATIENT
Start: 2023-10-27 | End: 2023-10-27

## 2023-10-27 ASSESSMENT — PAIN - FUNCTIONAL ASSESSMENT: PAIN_FUNCTIONAL_ASSESSMENT: ACTIVITIES ARE NOT PREVENTED

## 2023-10-27 ASSESSMENT — PAIN DESCRIPTION - ORIENTATION: ORIENTATION: LEFT;ANTERIOR;LOWER

## 2023-10-27 ASSESSMENT — PAIN DESCRIPTION - PAIN TYPE: TYPE: ACUTE PAIN

## 2023-10-27 ASSESSMENT — PAIN DESCRIPTION - LOCATION: LOCATION: LEG

## 2023-10-27 ASSESSMENT — PAIN DESCRIPTION - FREQUENCY: FREQUENCY: INTERMITTENT

## 2023-10-27 ASSESSMENT — PAIN DESCRIPTION - ONSET: ONSET: ON-GOING

## 2023-10-27 ASSESSMENT — PAIN SCALES - GENERAL: PAINLEVEL_OUTOF10: 0

## 2023-10-27 NOTE — PROGRESS NOTES
[address] [place] [address]   Progress Note and Procedure Note      Margaret Recinos  MEDICAL RECORD NUMBER:  [id number]  AGE: 74 y.o. GENDER: male  : 1955  EPISODE DATE:  10/27/2023    Subjective:     Chief Complaint   Patient presents with    Wound Check     Follow up appointment for left lower leg wound         HISTORY of PRESENT ILLNESS HPI     Daniel Jordan is a 74 y.o. male who presents today for wound/ulcer evaluation. History of Wound Context: venous ulcer. Patient reports that the wound started as a blister that developed into an ulcer. He has dependent edema from sitting in a wheelchair long-term. He recently had 2 toes on his left foot amputated. He reports that he falls a lot, but does not have a shoe insert to help maintain balance. Wound/Ulcer Pain Timing/Severity: none  Quality of pain: N/A  Severity:  0 / 10   Modifying Factors: N/A  Associated Signs/Symptoms:  edema    Ulcer Identification  Ulcer Type: venous    Contributing Factors: edema, decreased mobility, smoking, and Stage IV chronic kidney disease    Acute Wound: N/A not an acute wound    PAST MEDICAL HISTORY        Diagnosis Date    Atrial fibrillation ([facility])     CHF (congestive heart failure) ([facility])     diasolic    COPD (chronic obstructive pulmonary disease) ([facility])     DM2 (diabetes mellitus, type 2) ([facility])     GERD without esophagitis     Hyperlipidemia     Hypertension     Hypothyroidism     Kidney disease, chronic, stage IV (severe, EGFR 15-29 ml/min) ([facility])     MI (myocardial infarction) ([facility])     NSTEMI (non-ST elevated myocardial infarction) ([facility])     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 No pertinent surgical history. FAMILY HISTORY    History reviewed. No pertinent family history.     SOCIAL HISTORY    Social History     Tobacco Use    Smoking status: Former     Types: Cigarettes

## 2023-10-27 NOTE — PATIENT INSTRUCTIONS
[facility] Physician Orders   [facility] AT [address], [facility] [address]  Telephone: [phone]    NAME:  Honey Gonzales OF BIRTH:  1955  MEDICAL RECORD NUMBER:  [id number]  DATE:  10/27/2023    Congratulations! You have completed your treatment. Return to your Primary Care Physician for all your health issues. Resume your ordinary activities as tolerated. Take your medications as prescribed by your primary care physician. Check your skin daily for cracks, bruises, sores, or dryness. Use a moisturizer as needed. Clean and dry your skin, using mild soap and warm water (not hot). Avoid alcohol and caffeine and do not smoke. Maintain a nutritious diet. Avoid pressure on your wound site. Keep your legs elevated above the level of the heart whenever possible. Wear well-fitting shoes and leg garments. Other: Make an appointment to receive shoe insert, prescription given last visit and copy given this visit. Wound is healed and no follow up needed.        Physician Signature:______________________    Date: ___________ Time:  ____________    Sergei Perry CNP               Electronically signed by : Timothy Haskins on 10/27/2023 at 10:31 AM

## 2023-10-30 PROBLEM — R60.0 LOCALIZED EDEMA: Status: ACTIVE | Noted: 2023-10-30

## 2023-10-30 PROBLEM — I83.892 VARICOSE VEINS OF LEFT LOWER EXTREMITY WITH OTHER COMPLICATIONS: Status: ACTIVE | Noted: 2023-10-30

## 2023-10-31 ENCOUNTER — TELEPHONE (OUTPATIENT)
Dept: SURGERY | Age: 68
End: 2023-10-31

## 2023-10-31 ENCOUNTER — TELEPHONE (OUTPATIENT)
Dept: CARDIOLOGY CLINIC | Age: 68
End: 2023-10-31

## 2023-10-31 DIAGNOSIS — I48.0 PAROXYSMAL A-FIB (HCC): Primary | ICD-10-CM

## 2023-10-31 DIAGNOSIS — Z01.818 PRE-OP TESTING: ICD-10-CM

## 2023-10-31 NOTE — TELEPHONE ENCOUNTER
Home care needs to have clarification on the left shoe insert that was ordered for the PT. Call and advise.

## 2023-11-01 NOTE — TELEPHONE ENCOUNTER
Spoke with [address] at [facility] we are re-scheduling patients Watchman procedure on 11/6/2023 arrival at 9:30. Labs will be done at the nursing facility. The insurance has been approved and Mary Jo Marcelo Prateek also set up transportation.

## 2023-11-01 NOTE — TELEPHONE ENCOUNTER
Sarah's callback: [phone] ext. [facility] called the office to inform that Daniel has been cleared by Wound Care to proceed with his Watchman procedure. Please assist with scheduling.

## 2023-11-02 RX ORDER — SODIUM CHLORIDE 9 MG/ML
INJECTION, SOLUTION INTRAVENOUS CONTINUOUS
OUTPATIENT
Start: 2023-11-06

## 2023-11-02 NOTE — TELEPHONE ENCOUNTER
Spoke with Gary Jain at [facility]. The patient may benefit from custom shoe inserts. He reports significant balance issues with frequent falls since having 2 toes, left foot, amputated.

## 2023-11-06 ENCOUNTER — HOSPITAL ENCOUNTER (OUTPATIENT)
Dept: CARDIAC CATH/INVASIVE PROCEDURES | Age: 68
Setting detail: SURGERY ADMIT
Discharge: HOME | End: 2023-11-06

## 2023-11-29 NOTE — TELEPHONE ENCOUNTER
Triston Douglas from [facility] called back and would like to reschedule the patient's watchman procedure. [phone] Ext.  [address]

## 2023-12-05 NOTE — TELEPHONE ENCOUNTER
Caprice Renteria 6/5/2023 referred for Watchman procedure.   Dr. Elvin Guevara consulted on 7/28/2023  Dr. Jose Rodriguez shared decision on 8/31/2023  MARY to be done the day of the procedure  Insurance to be approved per [facility]

## 2023-12-05 NOTE — TELEPHONE ENCOUNTER
Spoke with patients Nursing Home at [facility] and talked to Petra and informed her of the procedure that is being scheduled for 1/15/2024 at 6:45 AM arrival.  Lab work has been ordered and instructed to have done at [facility] 1/8/2024. Also informed to get Hibiclens bath at their Pharmacy. All procedure instructions were e-mailed to patient nursing facility. Instructed to contact me with any questions prior to procedure.

## 2024-01-04 ENCOUNTER — TELEPHONE (OUTPATIENT)
Dept: CARDIOLOGY CLINIC | Age: 69
End: 2024-01-04

## 2024-01-04 NOTE — TELEPHONE ENCOUNTER
Spoke with patients Nursing Home at [place] and talked to Sarah and informed her of the procedure that is being scheduled for 1/15/2024 at 6:45 AM arrival

## 2024-01-09 DIAGNOSIS — Z01.818 PRE-OP TESTING: ICD-10-CM

## 2024-01-09 DIAGNOSIS — I48.0 PAROXYSMAL A-FIB (HCC): ICD-10-CM

## 2024-01-09 LAB
ABO + RH BLD: NORMAL
ANION GAP SERPL CALCULATED.3IONS-SCNC: 9 MMOL/L (ref 3–16)
BACTERIA URNS QL MICRO: ABNORMAL /HPF
BILIRUB UR QL STRIP.AUTO: NEGATIVE
BLD GP AB SCN SERPL QL: NORMAL
BUN SERPL-MCNC: 47 MG/DL (ref 7–20)
CALCIUM SERPL-MCNC: 8.6 MG/DL (ref 8.3–10.6)
CHLORIDE SERPL-SCNC: 95 MMOL/L (ref 99–110)
CLARITY UR: CLEAR
CO2 SERPL-SCNC: 39 MMOL/L (ref 21–32)
COLOR UR: YELLOW
CREAT SERPL-MCNC: 2 MG/DL (ref 0.8–1.3)
DEPRECATED RDW RBC AUTO: 15.6 % (ref 12.4–15.4)
EPI CELLS #/AREA URNS AUTO: 0 /HPF (ref 0–5)
GFR SERPLBLD CREATININE-BSD FMLA CKD-EPI: 36 ML/MIN/{1.73_M2}
GLUCOSE SERPL-MCNC: 200 MG/DL (ref 70–99)
GLUCOSE UR STRIP.AUTO-MCNC: NEGATIVE MG/DL
HCT VFR BLD AUTO: 37.1 % (ref 40.5–52.5)
HGB BLD-MCNC: 11.8 G/DL (ref 13.5–17.5)
HGB UR QL STRIP.AUTO: ABNORMAL
HYALINE CASTS #/AREA URNS AUTO: 2 /LPF (ref 0–8)
INR PPP: 1.23 (ref 0.84–1.16)
KETONES UR STRIP.AUTO-MCNC: NEGATIVE MG/DL
LEUKOCYTE ESTERASE UR QL STRIP.AUTO: NEGATIVE
MCH RBC QN AUTO: 27.2 PG (ref 26–34)
MCHC RBC AUTO-ENTMCNC: 31.7 G/DL (ref 31–36)
MCV RBC AUTO: 86 FL (ref 80–100)
NITRITE UR QL STRIP.AUTO: NEGATIVE
PH UR STRIP.AUTO: 6.5 [PH] (ref 5–8)
PLATELET # BLD AUTO: 352 K/UL (ref 135–450)
PMV BLD AUTO: 9.9 FL (ref 5–10.5)
POTASSIUM SERPL-SCNC: 4.2 MMOL/L (ref 3.5–5.1)
PROT UR STRIP.AUTO-MCNC: 300 MG/DL
PROTHROMBIN TIME: 15.5 SEC (ref 11.5–14.8)
RBC # BLD AUTO: 4.32 M/UL (ref 4.2–5.9)
RBC CLUMPS #/AREA URNS AUTO: 8 /HPF (ref 0–4)
SODIUM SERPL-SCNC: 143 MMOL/L (ref 136–145)
SP GR UR STRIP.AUTO: 1.01 (ref 1–1.03)
URN SPEC COLLECT METH UR: ABNORMAL
UROBILINOGEN UR STRIP-ACNC: 0.2 E.U./DL
WBC # BLD AUTO: 14.1 K/UL (ref 4–11)
WBC #/AREA URNS AUTO: 0 /HPF (ref 0–5)

## 2024-01-10 NOTE — TELEPHONE ENCOUNTER
Watchman procedure on 1/15/2024 arrival at 9:30 AM Daniel Pierre 1955 Kasi Medrano    I spoke with Sarah at Nursing Home regarding the lab results.  Patient is currently taking Eliquis and told to take 1/13/24 dose then to STOP anticoagulation and was told to take a BASA the day of the procedure. I have also reminded not to let patient eat after Midnight prior to procedure.    Creatinine 2.0  Glucose 200  HgB 11.8  Plt 352  U/A Negative  PT/INR 15.5/1.23    CHADSvasc is at least 4 (Age, CHF, HTN, CAD)   HASbled is at least 3    Currently on Eliquis. He is a poor candidate for chronic anticoagulation with his history of falls/right leg hematoma.     Shannan Cassidy 6/5/2023 referred for Watchman procedure.  Dr. Villaseñor consulted on 7/28/2023.  Dr. Iverson shared decision on 8/31/2023  MARY to be done the day of the procedure.  Insurance to be approved per Anamika Oshea    Call patient on Friday to review medications/problems. YES  UTI (Antibiotic taken)? NO  Any groin issues? look for any type of rash, open skin, etc NO  On any blood thinners & when to stop taking? On ELIQUIS told to stop after 1/13/2024 dose.  Lab work addressed prior to admission. YES  Allergies addressed? YES   Pre-medication necessary? NO  Pt. staying overnight? NO  PT/INR, T&C & Glucose ordered for day of procedure on every patient. YES  \"Electrophysiology Testing\" order placed on every patient? YES

## 2024-01-15 ENCOUNTER — HOSPITAL ENCOUNTER (OUTPATIENT)
Dept: CARDIAC CATH/INVASIVE PROCEDURES | Age: 69
Discharge: NURSING, REHAB OR LONG-TERM CARE | End: 2024-01-15
Attending: INTERNAL MEDICINE | Admitting: INTERNAL MEDICINE
Payer: COMMERCIAL

## 2024-01-15 ENCOUNTER — ANESTHESIA (OUTPATIENT)
Dept: CARDIAC CATH/INVASIVE PROCEDURES | Age: 69
End: 2024-01-15
Payer: COMMERCIAL

## 2024-01-15 ENCOUNTER — ANESTHESIA EVENT (OUTPATIENT)
Dept: CARDIAC CATH/INVASIVE PROCEDURES | Age: 69
End: 2024-01-15
Payer: COMMERCIAL

## 2024-01-15 VITALS
BODY MASS INDEX: 31.83 KG/M2 | TEMPERATURE: 97.6 F | HEART RATE: 75 BPM | SYSTOLIC BLOOD PRESSURE: 118 MMHG | OXYGEN SATURATION: 95 % | HEIGHT: 68 IN | WEIGHT: 210 LBS | RESPIRATION RATE: 18 BRPM | DIASTOLIC BLOOD PRESSURE: 53 MMHG

## 2024-01-15 PROBLEM — I48.91 ATRIAL FIBRILLATION (HCC): Status: ACTIVE | Noted: 2024-01-15

## 2024-01-15 LAB
ABO + RH BLD: NORMAL
BLD GP AB SCN SERPL QL: NORMAL
DEPRECATED RDW RBC AUTO: 15.8 % (ref 12.4–15.4)
GLUCOSE BLD-MCNC: 145 MG/DL (ref 70–99)
HCT VFR BLD AUTO: 36 % (ref 40.5–52.5)
HGB BLD-MCNC: 11.8 G/DL (ref 13.5–17.5)
MCH RBC QN AUTO: 28 PG (ref 26–34)
MCHC RBC AUTO-ENTMCNC: 32.9 G/DL (ref 31–36)
MCV RBC AUTO: 85.3 FL (ref 80–100)
PERFORMED ON: ABNORMAL
PLATELET # BLD AUTO: 304 K/UL (ref 135–450)
PMV BLD AUTO: 8.8 FL (ref 5–10.5)
RBC # BLD AUTO: 4.22 M/UL (ref 4.2–5.9)
WBC # BLD AUTO: 10.8 K/UL (ref 4–11)

## 2024-01-15 PROCEDURE — 85027 COMPLETE CBC AUTOMATED: CPT

## 2024-01-15 PROCEDURE — 7100000010 HC PHASE II RECOVERY - FIRST 15 MIN: Performed by: ANESTHESIOLOGY

## 2024-01-15 PROCEDURE — 33340 PERQ CLSR TCAT L ATR APNDGE: CPT

## 2024-01-15 PROCEDURE — 36415 COLL VENOUS BLD VENIPUNCTURE: CPT

## 2024-01-15 PROCEDURE — 6360000002 HC RX W HCPCS: Performed by: INTERNAL MEDICINE

## 2024-01-15 PROCEDURE — 2580000003 HC RX 258: Performed by: NURSE ANESTHETIST, CERTIFIED REGISTERED

## 2024-01-15 PROCEDURE — C1894 INTRO/SHEATH, NON-LASER: HCPCS

## 2024-01-15 PROCEDURE — C1893 INTRO/SHEATH, FIXED,NON-PEEL: HCPCS

## 2024-01-15 PROCEDURE — 2709999900 HC NON-CHARGEABLE SUPPLY

## 2024-01-15 PROCEDURE — 6370000000 HC RX 637 (ALT 250 FOR IP)

## 2024-01-15 PROCEDURE — 86901 BLOOD TYPING SEROLOGIC RH(D): CPT

## 2024-01-15 PROCEDURE — 6360000002 HC RX W HCPCS

## 2024-01-15 PROCEDURE — 86850 RBC ANTIBODY SCREEN: CPT

## 2024-01-15 PROCEDURE — 2500000003 HC RX 250 WO HCPCS

## 2024-01-15 PROCEDURE — 93005 ELECTROCARDIOGRAM TRACING: CPT | Performed by: INTERNAL MEDICINE

## 2024-01-15 PROCEDURE — C1760 CLOSURE DEV, VASC: HCPCS

## 2024-01-15 PROCEDURE — 7100000011 HC PHASE II RECOVERY - ADDTL 15 MIN: Performed by: ANESTHESIOLOGY

## 2024-01-15 PROCEDURE — 2500000003 HC RX 250 WO HCPCS: Performed by: NURSE ANESTHETIST, CERTIFIED REGISTERED

## 2024-01-15 PROCEDURE — 2580000003 HC RX 258

## 2024-01-15 PROCEDURE — 6370000000 HC RX 637 (ALT 250 FOR IP): Performed by: NURSE ANESTHETIST, CERTIFIED REGISTERED

## 2024-01-15 PROCEDURE — 6360000002 HC RX W HCPCS: Performed by: NURSE ANESTHETIST, CERTIFIED REGISTERED

## 2024-01-15 PROCEDURE — 7100000001 HC PACU RECOVERY - ADDTL 15 MIN: Performed by: ANESTHESIOLOGY

## 2024-01-15 PROCEDURE — 7100000000 HC PACU RECOVERY - FIRST 15 MIN: Performed by: ANESTHESIOLOGY

## 2024-01-15 PROCEDURE — APPNB45 APP NON BILLABLE 31-45 MINUTES: Performed by: NURSE PRACTITIONER

## 2024-01-15 PROCEDURE — 3700000001 HC ADD 15 MINUTES (ANESTHESIA): Performed by: ANESTHESIOLOGY

## 2024-01-15 PROCEDURE — 6360000004 HC RX CONTRAST MEDICATION: Performed by: INTERNAL MEDICINE

## 2024-01-15 PROCEDURE — 86900 BLOOD TYPING SEROLOGIC ABO: CPT

## 2024-01-15 PROCEDURE — 93355 ECHO TRANSESOPHAGEAL (TEE): CPT

## 2024-01-15 PROCEDURE — 3700000000 HC ANESTHESIA ATTENDED CARE: Performed by: ANESTHESIOLOGY

## 2024-01-15 RX ORDER — SODIUM CHLORIDE 0.9 % (FLUSH) 0.9 %
5-40 SYRINGE (ML) INJECTION PRN
Status: DISCONTINUED | OUTPATIENT
Start: 2024-01-15 | End: 2024-01-15 | Stop reason: HOSPADM

## 2024-01-15 RX ORDER — SODIUM CHLORIDE 0.9 % (FLUSH) 0.9 %
5-40 SYRINGE (ML) INJECTION EVERY 12 HOURS SCHEDULED
Status: CANCELLED | OUTPATIENT
Start: 2024-01-15

## 2024-01-15 RX ORDER — HEPARIN SODIUM 1000 [USP'U]/ML
INJECTION, SOLUTION INTRAVENOUS; SUBCUTANEOUS PRN
Status: DISCONTINUED | OUTPATIENT
Start: 2024-01-15 | End: 2024-01-15 | Stop reason: SDUPTHER

## 2024-01-15 RX ORDER — ONDANSETRON 2 MG/ML
INJECTION INTRAMUSCULAR; INTRAVENOUS PRN
Status: DISCONTINUED | OUTPATIENT
Start: 2024-01-15 | End: 2024-01-15 | Stop reason: SDUPTHER

## 2024-01-15 RX ORDER — PROTAMINE SULFATE 10 MG/ML
INJECTION, SOLUTION INTRAVENOUS PRN
Status: DISCONTINUED | OUTPATIENT
Start: 2024-01-15 | End: 2024-01-15 | Stop reason: SDUPTHER

## 2024-01-15 RX ORDER — SODIUM CHLORIDE 9 MG/ML
INJECTION, SOLUTION INTRAVENOUS PRN
Status: DISCONTINUED | OUTPATIENT
Start: 2024-01-15 | End: 2024-01-15 | Stop reason: HOSPADM

## 2024-01-15 RX ORDER — SODIUM CHLORIDE 9 MG/ML
INJECTION, SOLUTION INTRAVENOUS CONTINUOUS
Status: DISCONTINUED | OUTPATIENT
Start: 2024-01-15 | End: 2024-01-15 | Stop reason: HOSPADM

## 2024-01-15 RX ORDER — SODIUM CHLORIDE 0.9 % (FLUSH) 0.9 %
5-40 SYRINGE (ML) INJECTION PRN
Status: CANCELLED | OUTPATIENT
Start: 2024-01-15

## 2024-01-15 RX ORDER — ACETAMINOPHEN 325 MG/1
650 TABLET ORAL EVERY 4 HOURS PRN
Status: CANCELLED | OUTPATIENT
Start: 2024-01-15

## 2024-01-15 RX ORDER — HYDROMORPHONE HYDROCHLORIDE 2 MG/ML
0.5 INJECTION, SOLUTION INTRAMUSCULAR; INTRAVENOUS; SUBCUTANEOUS EVERY 5 MIN PRN
Status: DISCONTINUED | OUTPATIENT
Start: 2024-01-15 | End: 2024-01-15 | Stop reason: HOSPADM

## 2024-01-15 RX ORDER — IPRATROPIUM BROMIDE AND ALBUTEROL SULFATE 2.5; .5 MG/3ML; MG/3ML
SOLUTION RESPIRATORY (INHALATION)
Status: COMPLETED
Start: 2024-01-15 | End: 2024-01-15

## 2024-01-15 RX ORDER — ALBUTEROL SULFATE 90 UG/1
AEROSOL, METERED RESPIRATORY (INHALATION) PRN
Status: DISCONTINUED | OUTPATIENT
Start: 2024-01-15 | End: 2024-01-15 | Stop reason: SDUPTHER

## 2024-01-15 RX ORDER — ACETAMINOPHEN 500 MG
1000 TABLET ORAL EVERY 8 HOURS PRN
Status: CANCELLED | OUTPATIENT
Start: 2024-01-15

## 2024-01-15 RX ORDER — SODIUM CHLORIDE 9 MG/ML
INJECTION, SOLUTION INTRAVENOUS CONTINUOUS PRN
Status: DISCONTINUED | OUTPATIENT
Start: 2024-01-15 | End: 2024-01-15 | Stop reason: SDUPTHER

## 2024-01-15 RX ORDER — SODIUM CHLORIDE 0.9 % (FLUSH) 0.9 %
5-40 SYRINGE (ML) INJECTION EVERY 12 HOURS SCHEDULED
Status: DISCONTINUED | OUTPATIENT
Start: 2024-01-15 | End: 2024-01-15 | Stop reason: HOSPADM

## 2024-01-15 RX ORDER — ONDANSETRON 2 MG/ML
4 INJECTION INTRAMUSCULAR; INTRAVENOUS
Status: DISCONTINUED | OUTPATIENT
Start: 2024-01-15 | End: 2024-01-15 | Stop reason: HOSPADM

## 2024-01-15 RX ORDER — ONDANSETRON 2 MG/ML
4 INJECTION INTRAMUSCULAR; INTRAVENOUS EVERY 6 HOURS PRN
Status: CANCELLED | OUTPATIENT
Start: 2024-01-15

## 2024-01-15 RX ORDER — SUCCINYLCHOLINE/SOD CL,ISO/PF 200MG/10ML
SYRINGE (ML) INTRAVENOUS PRN
Status: DISCONTINUED | OUTPATIENT
Start: 2024-01-15 | End: 2024-01-15 | Stop reason: SDUPTHER

## 2024-01-15 RX ORDER — ROCURONIUM BROMIDE 10 MG/ML
INJECTION, SOLUTION INTRAVENOUS PRN
Status: DISCONTINUED | OUTPATIENT
Start: 2024-01-15 | End: 2024-01-15 | Stop reason: SDUPTHER

## 2024-01-15 RX ORDER — PROPOFOL 10 MG/ML
INJECTION, EMULSION INTRAVENOUS PRN
Status: DISCONTINUED | OUTPATIENT
Start: 2024-01-15 | End: 2024-01-15 | Stop reason: SDUPTHER

## 2024-01-15 RX ORDER — IPRATROPIUM BROMIDE AND ALBUTEROL SULFATE 2.5; .5 MG/3ML; MG/3ML
1 SOLUTION RESPIRATORY (INHALATION) ONCE
Status: COMPLETED | OUTPATIENT
Start: 2024-01-15 | End: 2024-01-15

## 2024-01-15 RX ORDER — CEFAZOLIN SODIUM 1 G/3ML
INJECTION, POWDER, FOR SOLUTION INTRAMUSCULAR; INTRAVENOUS PRN
Status: DISCONTINUED | OUTPATIENT
Start: 2024-01-15 | End: 2024-01-15 | Stop reason: SDUPTHER

## 2024-01-15 RX ORDER — LIDOCAINE HYDROCHLORIDE 20 MG/ML
INJECTION, SOLUTION EPIDURAL; INFILTRATION; INTRACAUDAL; PERINEURAL PRN
Status: DISCONTINUED | OUTPATIENT
Start: 2024-01-15 | End: 2024-01-15 | Stop reason: SDUPTHER

## 2024-01-15 RX ADMIN — Medication 100 MG: at 07:47

## 2024-01-15 RX ADMIN — IPRATROPIUM BROMIDE AND ALBUTEROL SULFATE 1 DOSE: .5; 3 SOLUTION RESPIRATORY (INHALATION) at 10:09

## 2024-01-15 RX ADMIN — SODIUM CHLORIDE: 9 INJECTION, SOLUTION INTRAVENOUS at 07:36

## 2024-01-15 RX ADMIN — IOPAMIDOL 30 ML: 755 INJECTION, SOLUTION INTRAVENOUS at 08:43

## 2024-01-15 RX ADMIN — HEPARIN SODIUM 5000 UNITS: 1000 INJECTION INTRAVENOUS; SUBCUTANEOUS at 08:09

## 2024-01-15 RX ADMIN — PHENYLEPHRINE HYDROCHLORIDE 50 MCG/MIN: 10 INJECTION INTRAVENOUS at 08:05

## 2024-01-15 RX ADMIN — LIDOCAINE HYDROCHLORIDE 100 MG: 20 INJECTION, SOLUTION EPIDURAL; INFILTRATION; INTRACAUDAL; PERINEURAL at 07:47

## 2024-01-15 RX ADMIN — CEFAZOLIN 2 G: 1 INJECTION, POWDER, FOR SOLUTION INTRAVENOUS at 07:58

## 2024-01-15 RX ADMIN — PHENYLEPHRINE HYDROCHLORIDE 100 MCG: 10 INJECTION INTRAVENOUS at 08:01

## 2024-01-15 RX ADMIN — HEPARIN SODIUM 2000 UNITS: 1000 INJECTION INTRAVENOUS; SUBCUTANEOUS at 08:16

## 2024-01-15 RX ADMIN — PHENYLEPHRINE HYDROCHLORIDE 100 MCG: 10 INJECTION INTRAVENOUS at 08:09

## 2024-01-15 RX ADMIN — PROPOFOL 50 MG: 10 INJECTION, EMULSION INTRAVENOUS at 07:47

## 2024-01-15 RX ADMIN — ROCURONIUM BROMIDE 10 MG: 10 INJECTION, SOLUTION INTRAVENOUS at 07:47

## 2024-01-15 RX ADMIN — ROCURONIUM BROMIDE 10 MG: 10 INJECTION, SOLUTION INTRAVENOUS at 07:55

## 2024-01-15 RX ADMIN — Medication 2000 MG: at 08:11

## 2024-01-15 RX ADMIN — ONDANSETRON 4 MG: 2 INJECTION INTRAMUSCULAR; INTRAVENOUS at 08:00

## 2024-01-15 RX ADMIN — HEPARIN SODIUM 5000 UNITS: 1000 INJECTION INTRAVENOUS; SUBCUTANEOUS at 08:15

## 2024-01-15 RX ADMIN — ALBUTEROL SULFATE 2 PUFF: 90 AEROSOL, METERED RESPIRATORY (INHALATION) at 08:38

## 2024-01-15 RX ADMIN — SUGAMMADEX 200 MG: 100 INJECTION, SOLUTION INTRAVENOUS at 08:40

## 2024-01-15 RX ADMIN — PHENYLEPHRINE HYDROCHLORIDE 100 MCG: 10 INJECTION INTRAVENOUS at 08:03

## 2024-01-15 RX ADMIN — IPRATROPIUM BROMIDE AND ALBUTEROL SULFATE 1 DOSE: 2.5; .5 SOLUTION RESPIRATORY (INHALATION) at 10:09

## 2024-01-15 RX ADMIN — PROTAMINE SULFATE 30 MG: 10 INJECTION, SOLUTION INTRAVENOUS at 08:38

## 2024-01-15 RX ADMIN — ALBUTEROL SULFATE 2 PUFF: 90 AEROSOL, METERED RESPIRATORY (INHALATION) at 07:52

## 2024-01-15 ASSESSMENT — PAIN - FUNCTIONAL ASSESSMENT
PAIN_FUNCTIONAL_ASSESSMENT: 0-10
PAIN_FUNCTIONAL_ASSESSMENT: NONE - DENIES PAIN

## 2024-01-15 NOTE — ANESTHESIA PRE PROCEDURE
Department of Anesthesiology  Preprocedure Note       Name:  Daniel Pierre   Age:  68 y.o.  :  1955                                          MRN:  [id number]         Date:  1/15/2024      Surgeon: * No surgeons listed *    Procedure: * No procedures listed *    Medications prior to admission:   Prior to Admission medications    Medication Sig Start Date End Date Taking? Authorizing Provider   buprenorphine-naloxone (SUBOXONE) 4-1 MG FILM SL film Place 1 Film under the tongue daily.    ProviderHerbert MD   DIMETHICONE, TOPICAL, (MOISTUREL) 3 % LOTN Apply topically 2 times daily as needed    Herbert Wray MD   torsemide 40 MG TABS Take 40 mg by mouth 2 times daily 23   Anila Gramajo MD   apixaban (ELIQUIS) 5 MG TABS tablet Take 1 tablet by mouth 2 times daily 6/10/23   Francisca Sage MD   Cholecalciferol (VITAMIN D) 25 MCG TABS Take 1 tablet by mouth daily 23   Francisca Sage MD   benzonatate (TESSALON) 100 MG capsule Take 1 capsule by mouth 3 times daily as needed for Cough    Herbert Wray MD   fluticasone furoate-vilanterol (BREO ELLIPTA) 200-25 MCG/ACT AEPB inhaler Inhale 1 puff into the lungs daily    Herbert Wray MD   Emollient (EUCERIN ADVANCED REPAIR) CREA Apply topically to bilateral legs every day and night shift for itching and tightness    Herbert Wray MD   fluticasone (FLONASE) 50 MCG/ACT nasal spray 2 sprays by Each Nostril route daily    Herbert Wray MD   Dextromethorphan-guaiFENesin  MG/5ML SYRP Take 10 mLs by mouth every 4 hours as needed for Cough    Herbert Wray MD   isosorbide dinitrate (ISORDIL) 20 MG tablet Take 1 tablet by mouth 3 times daily    Herbert Wray MD   ondansetron (ZOFRAN) 4 MG tablet Take 1 tablet by mouth every 6 hours as needed for Nausea or Vomiting    Herbert Wray MD   tiotropium (SPIRIVA RESPIMAT) 2.5 MCG/ACT AERS inhaler Inhale 2 puffs into the lungs daily    Kamala

## 2024-01-15 NOTE — PROGRESS NOTES
Phase 1 complete, pt seen by anesthesiologist. VSS, pt resting comfortably.   Pain 2/10, chronic back pain, tolerable.  R groin incision site CDI.  Family updated.  Pt eating crackers and drinking water.  Will transition to phase 2 for d/c.   Report to SEDRICK Hawk.

## 2024-01-15 NOTE — H&P
[facility]  Cardiology    Primary Cardiologist: Chucky Mcbride MD  Referring Physician: Chucky Mcbride MD    Reason for Referral: Left atrial appendage closure    CC: Shared decision with cardiology provider.       Subjective:     History of Present Illness:    Daniel Pierre is a 67 y.o. patient with a PMH significant for atrial fibrillation, congestive heart failure, hyperlipidemia, hypertension, coronary artery disease, and COPD. He was admitted to the hospital 6/2023 after a fal. He had a right leg hematoma and right wrist fracture. His Elquis was restarted and referral was placed to discuss Watchman due to risk of recurrent falls.     Patient is being referred for Left Atrial Appendage Closure with WATCHMAN device for management of stroke risk resulting from non-valvular atrial fibrillation. Based on their past history, it has been determined that they are poor candidates for long-term oral-anticoagulation, however may be tolerant of short term treatment with AC as necessary.        Past Medical History:   has a past medical history of Atrial fibrillation ([facility]), CHF (congestive heart failure) ([facility]), COPD (chronic obstructive pulmonary disease) ([facility]), DM2 (diabetes mellitus, type 2) ([facility]), GERD without esophagitis, Hyperlipidemia, Hypertension, Hypothyroidism, Kidney disease, chronic, stage IV (severe, EGFR 15-29 ml/min) ([facility]), MI (myocardial infarction) ([facility]), NSTEMI (non-ST elevated myocardial infarction) ([facility]), 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    Surgical History:   has no past surgical history on file.     Social History:   reports that he has quit smoking. His smoking use included cigarettes. He has never used smokeless tobacco. He reports that he does not currently use alcohol. He reports that he does not currently use drugs.     Family History:  family history is not on file.    Home Medications:  Were reviewed and are

## 2024-01-15 NOTE — PROCEDURES
[facility]   Procedure Note      Procedure Performed by: Ant Villaseñor MD      Procedures performed:     Percutaneous transcatheter closure of the left atrial appendage with endocardial implant   Transeptal Puncture  MARY      Indication of procedure:  atrial fibrillation, CVA,  High Risk for bleeding  Bleeding Episodes    Patient has been seen by General cardiology and shared decision making has been made for pursuing ROYCE closure.   Patient here for ROYCE closure with Watchman device.       Details of procedure:     The patient was brought to the electrophysiology laboratory in stable condition. The patient was in a fasting and non-sedated state. The risks, benefits and alternatives of the procedure were discussed with the patient. The risks including, but not limited to, the risks of vascular injury, bleeding, infection, device malfunction, lead dislodgement, radiation exposure, injury to cardiac and surrounding structures (including pneumothorax), stroke, myocardial infarction and death were discussed in detail. The patient opted to proceed with the device implantation. Written informed consent was signed and placed in the chart. Prophylactic antibiotic was given. The patient was prepped and draped in a sterile fashion. A timeout protocol was completed to identify the patient and the procedure being performed. Patient underwent general anesthesia by anesthesiology team.       Transesophageal echocardiography was performed and measurements of left atrial appendage, including ostium size and depth were obtained for selection of appropriate watchman device.     Decision was made to use a size 35 Watchman device based on MARY measurement. Both groins were prepped in a sterile fashion. We gained access in both femoral veins. Right femoral access was obtained using modified using modified seldinger technique. Patient received a bolus of heparin prior transseptal puncture. Transseptal punctures through intact

## 2024-01-15 NOTE — PROGRESS NOTES
PRE-PROCEDURE    DATE: 1/15/2024 ARRIVAL TO CATH LAB: 0700    ADMIT SOURCE: Outpatient    ID & ALLERGY BAND: On    CONSENT: Yes    NPO SINCE: Midnight    LABS/PREGNANCY TEST: N/A    PULSES:     IV SITE : Started in left arm.  with fluids infusing at kvo 8:12 AM     EKG RHYTHM: Atrial Fibrillation

## 2024-01-15 NOTE — FLOWSHEET NOTE
WBC was 14.1 on 1/9.  Dr Villaseñor aware.  Repeat WBC sent.  Also patient states he had pneumonia last week.  Information from NH shows he has been on Levaquin..  Dr Vasquez here and saw patient.  Also seen by Dr Hassan.

## 2024-01-15 NOTE — DISCHARGE SUMMARY
[facility]    DISCHARGE SUMMARY      Patient ID:  Daniel Pierre  [id number] 68 y.o. 1955    Discharge date:  01/15/24    Admitting Physician: Ant Villaseñor MD     Discharge NP: GIBRAN Galeana - CNP    Admission Diagnoses: Paroxysmal atrial fibrillation [I48.0]    Discharge Diagnoses:   Patient Active Problem List   Diagnosis    Hyperkalemia    Acute on chronic congestive heart failure (HCC)    Traumatic closed fracture of head of right radius with minimal displacement    Chronic kidney disease (CKD), stage IV (severe) (HCC)    Leg hematoma, right, initial encounter    Chronic anticoagulation    Vitamin D deficiency    Acute on chronic heart failure with normal ejection fraction (HCC)    CHF (congestive heart failure), NYHA class I, acute, diastolic (HCC)    Persistent atrial fibrillation (HCC)    Troponin level elevated    Chest pain    Current smoker    COPD mixed type (HCC)    Coronary artery disease involving native coronary artery of native heart without angina pectoris    Mixed hyperlipidemia    Venous stasis ulcer of left lower leg with edema of left lower leg (HCC)    Leg swelling    Localized edema    Varicose veins of left lower extremity with other complications         Discharged Condition: good    Hospital Course: Daniel Pierre is a 68 y.o. male patient  with a PMH significant for atrial fibrillation, congestive heart failure, hyperlipidemia, hypertension, coronary artery disease, and COPD. He was admitted to the hospital 6/2023 after a fall. He had a right leg hematoma and right wrist fracture. His Elquis was restarted and referral was placed to discuss Watchman due to risk of recurrent falls.   (per H&P)    Patient presented today for Watchman Device implantation.     Impression     1. Atrial fibrillation (non-valvular) s/p UNsuccessful percutaneous left atrial appendage occlusion.   MARY revealed device to be poorly seated and not stable.     Recommendations     Continue

## 2024-01-15 NOTE — ANESTHESIA POSTPROCEDURE EVALUATION
Department of Anesthesiology  Postprocedure Note    Patient: Daniel Pierre  MRN: [id number]  YOB: 1955  Date of evaluation: 1/15/2024    Procedure Summary       Date: 01/15/24 Room / Location: [facility] Cath Lab; [facility] Echocardiography    Anesthesia Start: 0736 Anesthesia Stop: 0858    Procedure: ECHO MARY IN CARDIAC CATH Diagnosis: Paroxysmal atrial fibrillation    Scheduled Providers: Rodrigo Hassan MD Responsible Provider: Rodrigo Hassan MD    Anesthesia Type: General ASA Status: 4            Anesthesia Type: General    Valentin Phase I:      Valentin Phase II:      Anesthesia Post Evaluation    Patient location during evaluation: PACU  Patient participation: complete - patient participated  Level of consciousness: awake  Airway patency: patent  Nausea & Vomiting: no vomiting and no nausea  Cardiovascular status: hemodynamically stable  Respiratory status: acceptable  Hydration status: stable  Multimodal analgesia pain management approach  Pain management: adequate    No notable events documented.

## 2024-01-15 NOTE — PROGRESS NOTES
Telephone report called to [facility], report given to Tahira DUBOSE. No new scripts sent home with pt.   Discharge instructions reviewed and understanding verbalized per Tahira DUBOSE.

## 2024-01-15 NOTE — PROGRESS NOTES
Care assumed from Brianne DUBOSE. VSS. Pt awake and alert, VSS. Pt remains on 3 liters nasal cannula, per home dose. Right groin dressing CDI.

## 2024-01-15 NOTE — PROGRESS NOTES
Pt right groin dressing clean dry and intact, right foot warm, appropriate color and palpable pedal pulse. Pt assisted with dressing and ambulation from stretcher into wheel chair. Pt remains on 3 liters nasal cannula per home dose oxygen. [facility] transport arrived at hospital to take pt back to [facility]. Pt awake and alert, VSS. No report of pain or nausea, tolerating PO fluids and crackers well.   Pt discharged via wheel chair in stable condition to [facility] transport service to be taken back to facility. Pt bilateral wheezing improved. No signs of respiratory distress.

## 2024-01-15 NOTE — DISCHARGE INSTRUCTIONS
Watchman Discharge Instruction    Care of your puncture site:  Remove bandage 24 hours after the procedure.  May shower in 24 hours but do not sit in a bathtub/pool of water for 5 days or until the wound is healed.  Gently clean groin using soap and water.  Dry thoroughly and apply a Band-Aid that covers the entire site. Use Band-Aid until skin heals over in about 3-5 days.   Do not apply powder or lotion.    Limit walking and stair climbing today.    Normal Observations:  Soreness or tenderness which may last one week.  Mild oozing from the incision site.  Possible bruising that could last 2 weeks.    Activity:  You may resume normal activity in 5 days or after the wound heals.  Avoid lifting more than 10 pounds for 5 days or until the wound heals.  Avoid strenuous exercise or activity for 1 week.  You may return to work in 1 week  without restrictions       Call your doctor immediately if your condition worsens, for any other concerns, for a follow-up appointment or if you experience any of the following:  Increased swelling on the groin or leg.  Unusual pain, numbness, or tingling of the groin or down the leg.  Any signs of infection such as: redness, yellow drainage at the site, swelling or pain.    IF GROIN STARTS BLEEDING SIGNIFICANTLY:   LAY FLAT, HOLD FIRM DIRECT PRESSURE, AND CALL 911

## 2024-01-15 NOTE — PROGRESS NOTES
Pt arrived to PACU from cath lab, VSS, pt arouses to voice.  R groin site is CDI, no hematoma noted.  Pt in AFIB.  Neuro checks WNL, see epic for details.  R pedal pulse is palpable. Will continue to monitor.

## 2024-01-16 LAB
EKG ATRIAL RATE: 87 BPM
EKG DIAGNOSIS: NORMAL
EKG Q-T INTERVAL: 400 MS
EKG QRS DURATION: 110 MS
EKG QTC CALCULATION (BAZETT): 416 MS
EKG R AXIS: 59 DEGREES
EKG T AXIS: 92 DEGREES
EKG VENTRICULAR RATE: 65 BPM

## 2024-01-16 PROCEDURE — 93010 ELECTROCARDIOGRAM REPORT: CPT | Performed by: INTERNAL MEDICINE

## 2024-04-15 ENCOUNTER — OFFICE VISIT (OUTPATIENT)
Dept: ENT CLINIC | Age: 69
End: 2024-04-15

## 2024-04-15 VITALS
HEIGHT: 68 IN | HEART RATE: 63 BPM | TEMPERATURE: 97.2 F | BODY MASS INDEX: 31.93 KG/M2 | DIASTOLIC BLOOD PRESSURE: 65 MMHG | SYSTOLIC BLOOD PRESSURE: 143 MMHG

## 2024-04-15 DIAGNOSIS — H61.23 IMPACTED CERUMEN, BILATERAL: ICD-10-CM

## 2024-04-15 DIAGNOSIS — H90.3 SENSORINEURAL HEARING LOSS, BILATERAL: Primary | ICD-10-CM

## 2024-04-15 NOTE — PROGRESS NOTES
removed.  I have asked them to find the hearing test from his hearing aid dispenser and send it to me for evaluation.  In addition, when he finds his hearing aids and gets them back and he will let me know if there is a problem.    FOLLOW-UP: As needed.